# Patient Record
Sex: MALE | Race: WHITE | Employment: FULL TIME | URBAN - METROPOLITAN AREA
[De-identification: names, ages, dates, MRNs, and addresses within clinical notes are randomized per-mention and may not be internally consistent; named-entity substitution may affect disease eponyms.]

---

## 2017-10-30 ENCOUNTER — OFFICE VISIT (OUTPATIENT)
Dept: FAMILY MEDICINE | Facility: CLINIC | Age: 47
End: 2017-10-30
Payer: COMMERCIAL

## 2017-10-30 ENCOUNTER — RADIANT APPOINTMENT (OUTPATIENT)
Dept: GENERAL RADIOLOGY | Facility: CLINIC | Age: 47
End: 2017-10-30
Attending: PHYSICIAN ASSISTANT
Payer: COMMERCIAL

## 2017-10-30 VITALS
BODY MASS INDEX: 38.35 KG/M2 | DIASTOLIC BLOOD PRESSURE: 100 MMHG | HEART RATE: 101 BPM | OXYGEN SATURATION: 98 % | WEIGHT: 283.13 LBS | HEIGHT: 72 IN | TEMPERATURE: 97.7 F | SYSTOLIC BLOOD PRESSURE: 160 MMHG

## 2017-10-30 DIAGNOSIS — M79.672 PAIN OF LEFT HEEL: ICD-10-CM

## 2017-10-30 DIAGNOSIS — R73.01 ELEVATED FASTING BLOOD SUGAR: ICD-10-CM

## 2017-10-30 DIAGNOSIS — I10 HYPERTENSION GOAL BP (BLOOD PRESSURE) < 140/90: ICD-10-CM

## 2017-10-30 DIAGNOSIS — M72.2 PLANTAR FASCIITIS: Primary | ICD-10-CM
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
## 2017-10-30 DIAGNOSIS — E78.5 HYPERLIPIDEMIA LDL GOAL <160: ICD-10-CM

## 2017-10-30 DIAGNOSIS — M79.672 LEFT FOOT PAIN: ICD-10-CM

## 2017-10-30 DIAGNOSIS — E66.01 SEVERE OBESITY (BMI 35.0-39.9) WITH COMORBIDITY (H): ICD-10-CM

## 2017-10-30 LAB — HBA1C MFR BLD: 6.1 % (ref 4.3–6)

## 2017-10-30 PROCEDURE — 73630 X-RAY EXAM OF FOOT: CPT | Mod: LT

## 2017-10-30 PROCEDURE — 80061 LIPID PANEL: CPT | Performed by: PHYSICIAN ASSISTANT

## 2017-10-30 PROCEDURE — 83036 HEMOGLOBIN GLYCOSYLATED A1C: CPT | Performed by: PHYSICIAN ASSISTANT

## 2017-10-30 PROCEDURE — 82043 UR ALBUMIN QUANTITATIVE: CPT | Performed by: PHYSICIAN ASSISTANT

## 2017-10-30 PROCEDURE — 80053 COMPREHEN METABOLIC PANEL: CPT | Performed by: PHYSICIAN ASSISTANT

## 2017-10-30 PROCEDURE — 99214 OFFICE O/P EST MOD 30 MIN: CPT | Performed by: PHYSICIAN ASSISTANT

## 2017-10-30 PROCEDURE — 36415 COLL VENOUS BLD VENIPUNCTURE: CPT | Performed by: PHYSICIAN ASSISTANT

## 2017-10-30 RX ORDER — SIMVASTATIN 40 MG
40 TABLET ORAL AT BEDTIME
Qty: 90 TABLET | Refills: 0 | Status: SHIPPED | OUTPATIENT
Start: 2017-10-30 | End: 2018-10-16

## 2017-10-30 RX ORDER — LISINOPRIL 40 MG/1
40 TABLET ORAL DAILY
Qty: 90 TABLET | Refills: 0 | Status: SHIPPED | OUTPATIENT
Start: 2017-10-30 | End: 2018-06-20

## 2017-10-30 RX ORDER — IBUPROFEN 200 MG
800 TABLET ORAL
COMMUNITY
Start: 2017-10-30 | End: 2018-10-16

## 2017-10-30 RX ORDER — CHLORTHALIDONE 25 MG/1
25 TABLET ORAL DAILY
Qty: 90 TABLET | Refills: 0 | Status: SHIPPED | OUTPATIENT
Start: 2017-10-30 | End: 2018-06-20

## 2017-10-30 NOTE — NURSING NOTE
Chief Complaint   Patient presents with     Musculoskeletal Problem     left heel pain ~a few weeks. fell on Left foot Tuesday       Initial BP (!) 160/100  Pulse 101  Temp 97.7  F (36.5  C) (Tympanic)  Ht 6' (1.829 m)  Wt 283 lb 2 oz (128.4 kg)  SpO2 98%  BMI 38.4 kg/m2 Estimated body mass index is 38.4 kg/(m^2) as calculated from the following:    Height as of this encounter: 6' (1.829 m).    Weight as of this encounter: 283 lb 2 oz (128.4 kg).  Medication Reconciliation: complete   Gayla Mcduffie, CMA

## 2017-10-30 NOTE — PROGRESS NOTES
SUBJECTIVE:   Thomas Pierce is a 47 year old male who presents to clinic today for the following health issues:    Left heel pain:  Patient states he has had left heel pain for a few weeks. He states he will notice this pain while driving or when starting to walk, but the pain improves with walking. He reports no history of heel pain and hasn't noticed any blisters or changes in his skin.     Fall on left foot:  Patient states last Tuesday night he missed a step while getting out of his truck and fell, landing on his left foot. He states there was little swelling of his foot x 1-2 days. He describes the pain being located on the lateral aspect of his foot, beneath his ankle. He did not use any ice in treatment of this, and took aleve x 1 time in treatment of his pain. He states the aleve maybe helped a little. Patient reports that at rest he doesn't have any foot pain (other than heel pain), but that he can feel more pain with walking. He does state the pain is much improved from last week, but that he is not fully bearing weight on it yet secondary to discomfort.     Hypertension/Hyperlipidemia  Patient reports that he was previously followed by Dr. Leal (last OV at [facility] 7/22/16) but is unable to see her any longer due to her transfer to [facility].  He reports that he is taking his lisinopril (however per [facility] he last received a 6 month supply on 7/22/16) daily including this morning.  Historically he required 2 medications, including chlorthalidone.  He was able to stop the chlorthalidone once losing weight, unfortunately, he has put this weight back on.   He also reports still taking simvastatin 40 mg daily.    Elevated blood sugars  He has a history of elevated blood sugars and was on metformin 500 mg XR BID in 2014.  He reports being off this medication with his previous weight loss.  He has not had any recent labs checked and does not check blood sugars at home.    Wt  Readings from Last 5 Encounters:   10/30/17 283 lb 2 oz (128.4 kg)   04/27/15 208 lb (94.3 kg)   12/29/14 211 lb (95.7 kg)   06/24/14 262 lb 4.8 oz (119 kg)   05/30/14 275 lb (124.7 kg)       Problem list and histories reviewed & adjusted, as indicated.  Additional history: as documented    Patient Active Problem List   Diagnosis     Hypertension goal BP (blood pressure) < 140/90     Hyperlipidemia LDL goal <160     Elevated LFTs     Severe obesity (BMI 35.0-39.9) with comorbidity (H)     Elevated fasting blood sugar     Past Surgical History:   Procedure Laterality Date     MANDIBLE SURGERY         Social History   Substance Use Topics     Smoking status: Never Smoker     Smokeless tobacco: Never Used     Alcohol use Yes      Comment: occasionally      Family History   Problem Relation Age of Onset     DIABETES Mother      Type II     Cardiovascular Maternal Grandmother      PAD         Current Outpatient Prescriptions   Medication Sig Dispense Refill     ibuprofen (ADVIL/MOTRIN) 200 MG tablet Take 4 tablets (800 mg) by mouth 3 times daily (with meals) For at least 5 days       chlorthalidone (HYGROTON) 25 MG tablet Take 1 tablet (25 mg) by mouth daily 90 tablet 0     lisinopril (PRINIVIL/ZESTRIL) 40 MG tablet Take 1 tablet (40 mg) by mouth daily 90 tablet 0     simvastatin (ZOCOR) 40 MG tablet Take 1 tablet (40 mg) by mouth At Bedtime 90 tablet 0     aspirin 81 MG tablet Take 1 tablet by mouth daily.       No Known Allergies      Reviewed and updated as needed this visit by clinical staff  Tobacco  Allergies  Meds  Problems  Med Hx  Surg Hx  Fam Hx  Soc Hx        Reviewed and updated as needed this visit by Provider  Tobacco  Allergies  Meds  Problems  Med Hx  Surg Hx  Fam Hx  Soc Hx          ROS:  Constitutional, HEENT, cardiovascular, pulmonary, GI, , musculoskeletal, neuro, skin, endocrine and psych systems are negative, except as otherwise noted.      OBJECTIVE:   BP (!) 160/100  Pulse 101   Temp 97.7  F (36.5  C) (Tympanic)  Ht 6' (1.829 m)  Wt 283 lb 2 oz (128.4 kg)  SpO2 98%  BMI 38.4 kg/m2  Body mass index is 38.4 kg/(m^2).  GENERAL: healthy, alert and no distress  RESP: lungs clear to auscultation - no rales, rhonchi or wheezes  CV: regular rate and rhythm, normal S1 S2, no S3 or S4, no murmur, click or rub, no peripheral edema and peripheral pulses strong  MS: no gross musculoskeletal defects noted, no edema; no tenderness to palpation over left lateral malleolus; no pain with dorsiflexion, planterlexion, inversion or eversion of left ankle; no pain with valgus or varus maneuver; tenderness to palpation over insertion of plantar fascia; signifcant dry skin noted throughout plantar aspect of left foot  PSYCH: mentation appears normal, affect normal/bright    Diagnostic Test Results:  No results found for this or any previous visit (from the past 24 hour(s)).  Recent Results (from the past 744 hour(s))   XR Foot Left G/E 3 Views    Narrative    XR FOOT LT G/E 3 VW  10/30/2017 10:10 AM    HISTORY:  Pain    COMPARISON:  None.      Impression    IMPRESSION:  Negative.     LAZARA SALOMON MD       ASSESSMENT/PLAN:   Thomas was seen today for musculoskeletal problem.    Diagnoses and all orders for this visit:    Plantar fasciitis; Pain of left heel  Given patient's description of pain improved with walking and worsened with rest, patient diagnosed with plantar fascitis. Patient advised to take ibuprofen 800 mg TID to help with inflammation. Patient also given handout with foot exercises to help improve his plantar fascitis symptoms. Patient also had significant dryness of his heel and it was recommended he begin using Aquaphor ointment every night followed by placement of socks to aid in this and to prevent infection with cracking of heels.   -     ibuprofen (ADVIL/MOTRIN) 200 MG tablet; Take 4 tablets (800 mg) by mouth 3 times daily (with meals) For at least 5 days    Left foot pain; Fall (on)  (from) other stairs and steps, initial encounter  X-ray did not show any evidence of fracture at this time. Patient advised to ice foot as needed and use ibuprofen PRN to assist with decreasing the inflammation and pain. Patient to return if symptoms do not improve or worsen.  -     XR Foot Left G/E 3 Views; Future    Hypertension goal BP (blood pressure) < 140/90  Patient's blood pressure today was found to be elevated at 160/100. He states he continues to take lisinopril in management of this hypertension and that his blood pressure tends to wax and wane, but he hasn't been checking it very often as of late. In 2015 he was also treated with chlorthalidone 25 mg for his hypertension and it worked well. Per patient, he had been seeing Dr. Leal at [facility], however she changed clinics and his insurance isn't covered there so he does not currently have a PCP. Therefore, patient's lisinopril refilled and chlorthalidone added also added to his treatment regimen. Patient to return in next few weeks to have BP rechecked in pharmacy. Patient encouraged to also monitor his blood pressure while at home.  -     Comprehensive metabolic panel (BMP + Alb, Alk Phos, ALT, AST, Total. Bili, TP)  -     Albumin Random Urine Quantitative with Creat Ratio  -     chlorthalidone (HYGROTON) 25 MG tablet; Take 1 tablet (25 mg) by mouth daily  -     lisinopril (PRINIVIL/ZESTRIL) 40 MG tablet; Take 1 tablet (40 mg) by mouth daily    Hyperlipidemia LDL goal <160  Patient continues to take simvastatin (every morning). Since it appears he has not had a lipid panel since 2015 and is fasting today, this was reordered.  -     Comprehensive metabolic panel (BMP + Alb, Alk Phos, ALT, AST, Total. Bili, TP)  -     Lipid panel reflex to direct LDL Fasting  -     simvastatin (ZOCOR) 40 MG tablet; Take 1 tablet (40 mg) by mouth At Bedtime    Elevated fasting blood sugar  Patient has a PMH significant for diabetes. His last Hgb A1c was 5.5 on  7/22/16. He had been taking metformin prior to this, but states he was able to discontinue this after losing a considerable amount of weight (~70 lbs) and at that point was no longer considered to be diabetic. He has since gained that weight back and thus a repeat Hgb A1c was ordered at this time to ensure he is not diabetic.  -     Hemoglobin A1c    Jasmyn Giles PA-C  [facility] [place]

## 2017-10-30 NOTE — PATIENT INSTRUCTIONS
Aquaphor ointment                Plantar Fasciitis   What is plantar fasciitis?   Plantar fasciitis is a painful inflammation of the bottom of the foot between the ball of the foot and the heel.   How does it occur?   There are several possible causes of plantar fasciitis, including:     wearing high heels     gaining weight     increased walking, standing, or stair-climbing   If you wear high-heeled shoes, including western-style boots, for long periods of time, the tough, tendonlike tissue of the bottom of your foot can become shorter. This layer of tissue is called fascia. Pain occurs when you stretch fascia that has shortened. This painful stretching might happen, for example, when you walk barefoot after getting out of bed in the morning.   If you gain weight, you might be more likely to have plantar fasciitis, especially if you walk a lot or  shoes with poor heel cushioning. Normally there is a pad of fatty tissue under your heel bone. Weight gain might break down this fat pad and cause heel pain.   Runners may get plantar fasciitis when they change their workout and increase their mileage or frequency of workouts. It can also occur with a change in exercise surface or terrain, or if your shoes are worn out and don't provide enough cushion for your heels.   If the arches of your foot are abnormally high or low, you are more likely to develop plantar fasciitis than if your arches are normal.   What are the symptoms?   The main symptom of plantar fasciitis is heel pain when you walk. You may also feel pain when you stand and possibly even when you are resting. This pain typically occurs first thing in the morning after you get out of bed, when your foot is placed flat on the floor. The pain occurs because you are stretching the plantar fascia. The pain usually lessens with more walking, but you may have it again after periods of rest.   You may feel no pain when you are sleeping because the position of  your feet during rest allows the fascia to shorten and relax.   How is it diagnosed?   Your healthcare provider will ask about your symptoms. He or she will ask if the bottom of your heel is tender and if you have pain when you stretch the bottom of your foot. An X-ray of your heel may be done.   How is it treated?     Give your painful heel lots of rest. You may need to stay completely off your foot for several days when the pain is severe.     Your healthcare provider may recommend or prescribe anti-inflammatory medicines, such as aspirin or ibuprofen. These drugs decrease pain and inflammation. Adults aged 65 years and older should not take non-steroidal anti-inflammatory medicine for more than 7 days without their healthcare provider's approval. Resting your heel on an ice pack for a few minutes several times a day can also help.     Try to cushion your foot. You can do this by wearing athletic shoes, even at work, for awhile. Heel cushions can also be used. The cushions should be worn in both shoes. They are most helpful if you are overweight or an older adult.     Your provider may recommend special arch supports or inserts for your shoes called orthotics, either custom-made or off the shelf. These supports can be particularly helpful if you have flat feet or high arches.     Your provider may recommend an injection of a cortisone-like medicine.     Lose weight if needed.     A night splint may be recommended. This will keep the plantar fascia stretched while you are sleeping.     Physical therapy for additional treatments may be recommended     Surgery is rarely needed.   How long will the effects last?   You may find that the pain is sometimes worse and sometimes better over time. If you get treatment soon after you notice the pain, the symptoms should stop after several weeks. If, however, you have had plantar fasciitis for a long time, it may take many weeks to months for the pain to go away.   When can I  return to my normal activities?   Everyone recovers from an injury at a different rate. Return to your activities will be determined by how soon your foot recovers, not by how many days or weeks it has been since your injury has occurred. In general, the longer you have symptoms before you start treatment, the longer it will take to get better. The goal of rehabilitation is to return you to your normal activities as soon as is safely possible. If you return too soon you may worsen your injury.   You may safely return to your activities when, starting from the top of the list and progressing to the end, each of the following is true:     You have full range of motion in the injured foot compared with the uninjured foot.     You have full strength of the injured foot compared with the uninjured foot.     You can walk straight ahead without significant pain or limping.   How can I prevent plantar fasciitis?   The best way to prevent plantar fasciitis is to wear shoes that are well made and fit your feet. This is especially important when you exercise or walk a lot or stand for a long time on hard surfaces. Get new athletic shoes before your old shoes stop supporting and cushioning your feet.   You should also:     Avoid repeated jarring to the heel.     Keep a healthy weight.     Do your leg and foot stretching exercises regularly.   Developed by [employer]   Published by [employer]   Last modified: 2008-08-11   Last reviewed: 2008-07-07   This content is reviewed periodically and is subject to change as new health information becomes available. The information is intended to inform and educate and is not a replacement for medical evaluation, advice, diagnosis or treatment by a healthcare professional.   Sports Medicine Advisor 2009.1 Index  Sports Medicine Advisor 2009.1 Credits     2009 [employer] and/or its affiliates. All Rights Reserved.               Plantar Fasciitis Rehabilitation Exercises   You may begin  exercising the muscles of your foot right away by gently stretching them as follows:     Prone hip extension: Lie on your stomach with your legs straight out behind you. Tighten the buttocks and thigh muscles of your injured leg and lift it off the floor about 8 inches. Keep your knee straight. Hold for 5 seconds. Then lower your leg and relax. Do 3 sets of 10.     Towel stretch: Sit on a hard surface with one leg stretched out in front of you. Loop a towel around your toes and the ball of your foot and pull the towel toward your body keeping your knee straight. Hold this position for 15 to 30 seconds then relax. Repeat 3 times.   When the towel stretch becomes too easy, you may begin doing the standing calf stretch.     Standing calf stretch: Facing a wall, put your hands against the wall at about eye level. Keep one leg back with the heel on the floor, and the other leg forward. Turn your back foot slightly inward (as if you were pigeon-toed) as you slowly lean into the wall until you feel a stretch in the back of your calf. Hold for 15 to 30 seconds. Repeat 3 times and then switch the position of your legs and repeat the exercise 3 times. Do this exercise several times each day.     Sitting plantar fascia stretch: Sit in a chair and cross one foot over your other knee. Grab the base of your toes and pull them back toward your leg until you feel a comfortable stretch. Hold 15 seconds and repeat 3 times.   When you can stand comfortably on your injured foot, you can begin standing to stretch the bottom of your foot using the plantar fascia stretch.     Achilles stretch: Stand with the ball of one foot on a stair. Reach for the bottom step with your heel until you feel a stretch in the arch of your foot. Hold this position for 15 to 30 seconds and then relax. Repeat 3 times.   After you have stretched the bottom muscles of your foot, you can begin strengthening the top muscles of your foot.     Frozen can roll: Roll  your bare injured foot back and forth from your heel to your mid-arch over a frozen juice can. Repeat for 3 to 5 minutes. This exercise is particularly helpful if done first thing in the morning.     Towel pickup: With your heel on the ground,  a towel with your toes. Release. Repeat 10 to 20 times. When this gets easy, add more resistance by placing a book or small weight on the towel.     Balance and reach exercises   Stand upright next to a chair with your injured leg farthest from the chair. This will provide you with support if you need it. Stand just on the foot of your injured leg. Try to raise the arch of this foot while keeping your toes on the floor.   A. Keep your foot in this position and reach forward in front of you with the hand farthest away from the chair, allowing your knee to bend. Repeat this 10 times while maintaining the arch height. This exercise can be made more difficult by reaching farther in front of you. Do 2 sets.   B.  the same position as above. While maintaining your arch height, reach the hand farthest away from the chair across your body toward the chair. The farther you reach, the more challenging the exercise. Do 2 sets of 10.     Heel raise: Balance yourself while standing behind a chair or counter. Using the chair to help you, raise your body up onto your toes and hold for 5 seconds. Then slowly lower yourself down without holding onto the chair. Hold onto the chair or counter if you need to. When this exercise becomes less painful, try lowering on one leg only. Repeat 10 times. Do 3 sets of 10.     Side-lying leg lift: Lying on your uninjured side, tighten the front thigh muscles on your top leg and lift that leg 8 to 10 inches away from the other leg. Keep the leg straight and lower slowly. Do 3 sets of 10.   Written by Annetta Nascimento, MS, PT, and Rebecca Prince PT, [facility], OCS, for [employer].   Published by [employer].   Last modified: 2009-02-09   Last  reviewed: 2008-07-07   This content is reviewed periodically and is subject to change as new health information becomes available. The information is intended to inform and educate and is not a replacement for medical evaluation, advice, diagnosis or treatment by a healthcare professional.   Sports Medicine Advisor 2009.1 Index

## 2017-10-31 LAB
ALBUMIN SERPL-MCNC: 4.3 G/DL (ref 3.4–5)
ALP SERPL-CCNC: 71 U/L (ref 40–150)
ALT SERPL W P-5'-P-CCNC: 83 U/L (ref 0–70)
ANION GAP SERPL CALCULATED.3IONS-SCNC: 11 MMOL/L (ref 3–14)
AST SERPL W P-5'-P-CCNC: 37 U/L (ref 0–45)
BILIRUB SERPL-MCNC: 0.4 MG/DL (ref 0.2–1.3)
BUN SERPL-MCNC: 16 MG/DL (ref 7–30)
CALCIUM SERPL-MCNC: 9.3 MG/DL (ref 8.5–10.1)
CHLORIDE SERPL-SCNC: 107 MMOL/L (ref 94–109)
CHOLEST SERPL-MCNC: 273 MG/DL
CO2 SERPL-SCNC: 24 MMOL/L (ref 20–32)
CREAT SERPL-MCNC: 0.8 MG/DL (ref 0.66–1.25)
GFR SERPL CREATININE-BSD FRML MDRD: >90 ML/MIN/1.7M2
GLUCOSE SERPL-MCNC: 111 MG/DL (ref 70–99)
HDLC SERPL-MCNC: 53 MG/DL
LDLC SERPL CALC-MCNC: 184 MG/DL
NONHDLC SERPL-MCNC: 220 MG/DL
POTASSIUM SERPL-SCNC: 4 MMOL/L (ref 3.4–5.3)
PROT SERPL-MCNC: 7.8 G/DL (ref 6.8–8.8)
SODIUM SERPL-SCNC: 142 MMOL/L (ref 133–144)
TRIGL SERPL-MCNC: 181 MG/DL

## 2017-11-01 LAB
CREAT UR-MCNC: 118 MG/DL
MICROALBUMIN UR-MCNC: 13 MG/L
MICROALBUMIN/CREAT UR: 11.36 MG/G CR (ref 0–17)

## 2017-11-03 NOTE — PROGRESS NOTES
Thomas  I have reviewed your recent labs. Here are the results:    -Liver and gallbladder tests (ALT,AST, Alk phos,bilirubin) are modestly elevated. ADVISE: Avoiding alcohol and tylenol products and rechecking this in 2 months.  -Kidney function (GFR) is normal.  -Sodium is normal.  -Potassium is normal.  -Glucose is slight elevated and may be sign of early diabetes (prediabetes). ADVISE:: low carbohydrate diet, exercise, try to lose weight (if necessary) and recheck glucose in 2 months. (GLU,A1C, DX: prediabetes)  -LDL(bad) cholesterol level is elevated and your triglycerides are elevated which can increase your heart disease risk.  A diet high in fat and simple carbohydrates, genetics and being overweight can contribute to this. ADVISE: Please ensure you are taking your cholesterol medication nightly, adding [id number] mg of fish oil to your diet daily and working on weight loss and diet changes (the mediterranean diet is a great start).  Rechecking in 2 months.  -A1C test (average blood sugar the last 2-3 months) is consistent with return of your prediabetes.  Please work on weight loss, diet and exercise changes to improve this and avoid converting to diabetes.  Recheck in 2 months.  -Microalbumin (urine protein) test is normal.  ADVISE: recheck annually     If you have any questions please do not hesitate to contact our office via phone ([phone]) or MyChart.    Jasmyn Giles, MS, PA-C  [facility]

## 2018-01-16 ENCOUNTER — ALLIED HEALTH/NURSE VISIT (OUTPATIENT)
Dept: FAMILY MEDICINE | Facility: CLINIC | Age: 48
End: 2018-01-16
Payer: COMMERCIAL

## 2018-01-16 VITALS — DIASTOLIC BLOOD PRESSURE: 86 MMHG | SYSTOLIC BLOOD PRESSURE: 130 MMHG

## 2018-01-16 DIAGNOSIS — Z01.30 BP CHECK: Primary | ICD-10-CM

## 2018-01-16 PROCEDURE — 99207 ZZC NO CHARGE NURSE ONLY: CPT | Performed by: FAMILY MEDICINE

## 2018-01-16 NOTE — MR AVS SNAPSHOT
After Visit Summary   1/16/2018    Thomas Pierce    MRN: [id number]           Patient Information     Date Of Birth          1970        Visit Information        Provider Department      1/16/2018 9:29 AM Lissette Leal, DO [facility]        Today's Diagnoses     BP check    -  1       Follow-ups after your visit        Who to contact     If you have questions or need follow up information about today's clinic visit or your schedule please contact [facility] directly at [phone].  Normal or non-critical lab and imaging results will be communicated to you by [employer], letter or phone within 4 business days after the clinic has received the results. If you do not hear from us within 7 days, please contact the clinic through [employer] or phone. If you have a critical or abnormal lab result, we will notify you by phone as soon as possible.  Submit refill requests through [employer] or call your pharmacy and they will forward the refill request to us. Please allow 3 business days for your refill to be completed.          Additional Information About Your Visit        MyChart Information     [employer] gives you secure access to your electronic health record. If you see a primary care provider, you can also send messages to your care team and make appointments. If you have questions, please call your primary care clinic.  If you do not have a primary care provider, please call [phone] and they will assist you.        Care EveryWhere ID     This is your Care EveryWhere ID. This could be used by other organizations to access your [place] medical records  [id number]         Blood Pressure from Last 3 Encounters:   01/16/18 130/86   10/30/17 (!) 160/100   04/27/15 110/72    Weight from Last 3 Encounters:   10/30/17 283 lb 2 oz (128.4 kg)   04/27/15 208 lb (94.3 kg)   12/29/14 211 lb (95.7 kg)              Today, you had the following     No orders found  for display       Primary Care Provider Office Phone # Fax #    Lissette Leal [phone]       [facility] [place] [address] [facility] [address]        Equal Access to Services     SAPNA MCKEON : Hadii jackie ku hadantonioo Soomaali, waaxda luqadaha, qaybta kaalmada adeegyada, waxay idiin hayjluisn adekailey winston laalyson bates. So [facility] [phone].    ATENCIÓN: Si habla español, tiene a tripathi disposición servicios gratuitos de asistencia lingüística. [facility] [phone].    We comply with applicable federal civil rights laws and [place] laws. We do not discriminate on the basis of race, color, national origin, age, disability, sex, sexual orientation, or gender identity.            Thank you!     Thank you for choosing [facility]  for your care. Our goal is always to provide you with excellent care. Hearing back from our patients is one way we can continue to improve our services. Please take a few minutes to complete the written survey that you may receive in the mail after your visit with us. Thank you!             Your Updated Medication List - Protect others around you: Learn how to safely use, store and throw away your medicines at www.disposemymeds.org.          This list is accurate as of: 1/16/18  9:31 AM.  Always use your most recent med list.                   Brand Name Dispense Instructions for use Diagnosis    aspirin 81 MG tablet      Take 1 tablet by mouth daily.        chlorthalidone 25 MG tablet    HYGROTON    90 tablet    Take 1 tablet (25 mg) by mouth daily    Hypertension goal BP (blood pressure) < 140/90       ibuprofen 200 MG tablet    ADVIL/MOTRIN     Take 4 tablets (800 mg) by mouth 3 times daily (with meals) For at least 5 days    Plantar fasciitis       lisinopril 40 MG tablet    PRINIVIL/ZESTRIL    90 tablet    Take 1 tablet (40 mg) by mouth daily    Hypertension goal BP (blood pressure) < 140/90       simvastatin 40 MG tablet    ZOCOR     90 tablet    Take 1 tablet (40 mg) by mouth At Bedtime    Hyperlipidemia LDL goal <160

## 2018-01-16 NOTE — PROGRESS NOTES
Thomas Pierce is enrolled/participating in the retail pharmacy Blood Pressure Goals Achievement Program (BPGAP).  Thomas Pierce was evaluated at [facility] on January 16, 2018 at which time his blood pressure was:    BP Readings from Last 3 Encounters:   01/16/18 130/86   10/30/17 (!) 160/100   04/27/15 110/72     Reviewed lifestyle modifications for blood pressure control and reduction: including making healthy food choices, managing weight, getting regular exercise, smoking cessation, reducing alcohol consumption, monitoring blood pressure regularly.     Thomas Pierce is not experiencing symptoms.    Follow-Up: BP is at goal of < 140/90mmHg (patient 18+ years of age with or without diabetes).  Recommended follow-up at pharmacy in 6 months.         Completed by: Smita Fang [facility]   [phone]

## 2018-03-21 ENCOUNTER — ALLIED HEALTH/NURSE VISIT (OUTPATIENT)
Dept: FAMILY MEDICINE | Facility: CLINIC | Age: 48
End: 2018-03-21
Payer: COMMERCIAL

## 2018-03-21 VITALS — DIASTOLIC BLOOD PRESSURE: 78 MMHG | SYSTOLIC BLOOD PRESSURE: 124 MMHG

## 2018-03-21 DIAGNOSIS — I10 HYPERTENSION GOAL BP (BLOOD PRESSURE) < 140/90: Primary | ICD-10-CM

## 2018-03-21 PROCEDURE — 99207 ZZC NO CHARGE NURSE ONLY: CPT | Performed by: FAMILY MEDICINE

## 2018-03-21 NOTE — PROGRESS NOTES
Thomas Pierce is enrolled/participating in the retail pharmacy Blood Pressure Goals Achievement Program (BPGAP).  Thomas Pierce was evaluated at [facility] on March 21, 2018 at which time his blood pressure was:    BP Readings from Last 3 Encounters:   03/21/18 124/78   01/16/18 130/86   10/30/17 (!) 160/100     Reviewed lifestyle modifications for blood pressure control and reduction: including making healthy food choices, managing weight, getting regular exercise, smoking cessation, reducing alcohol consumption, monitoring blood pressure regularly.     Thomas Pierce is not experiencing symptoms.    Follow-Up: BP is at goal of < 140/90mmHg (patient 18+ years of age with or without diabetes).  Recommended follow-up at pharmacy in 6 months.     Recommendation to Provider:      Thomas Pierce was evaluated for enrollment into the PGEN study today.    Patient eligible for enrollment:  No  Patient interested in enrollment:  No    Completed by:   Luca Colbert RPh  [facility]  [phone]

## 2018-03-21 NOTE — MR AVS SNAPSHOT
After Visit Summary   3/21/2018    Thomas Pierce    MRN: [id number]           Patient Information     Date Of Birth          1970        Visit Information        Provider Department      3/21/2018 12:29 PM Lissette Leal, DO [facility]        Today's Diagnoses     Hypertension goal BP (blood pressure) < 140/90    -  1       Follow-ups after your visit        Who to contact     If you have questions or need follow up information about today's clinic visit or your schedule please contact [facility] directly at [phone].  Normal or non-critical lab and imaging results will be communicated to you by [employer]art, letter or phone within 4 business days after the clinic has received the results. If you do not hear from us within 7 days, please contact the clinic through RV [employer]t or phone. If you have a critical or abnormal lab result, we will notify you by phone as soon as possible.  Submit refill requests through [email or web address] or call your pharmacy and they will forward the refill request to us. Please allow 3 business days for your refill to be completed.          Additional Information About Your Visit        MyChart Information     [email or web address] gives you secure access to your electronic health record. If you see a primary care provider, you can also send messages to your care team and make appointments. If you have questions, please call your primary care clinic.  If you do not have a primary care provider, please call [phone] and they will assist you.        Care EveryWhere ID     This is your Care EveryWhere ID. This could be used by other organizations to access your [place] medical records  [id number]         Blood Pressure from Last 3 Encounters:   03/21/18 124/78   01/16/18 130/86   10/30/17 (!) 160/100    Weight from Last 3 Encounters:   10/30/17 283 lb 2 oz (128.4 kg)   04/27/15 208 lb (94.3 kg)   12/29/14 211 lb (95.7 kg)              Today, you  had the following     No orders found for display       Primary Care Provider Office Phone # Fax #    Lissette Leal [phone]       [facility] [place] [address] [facility] [address]        Equal Access to Services     SOFYALETHA LINDA : Hadii aad ku hadasho Soomaali, waaxda luqadaha, qaybta kaalmada adeegyada, waxay idiin hayaan adeeg khjoesh laalyson . So [facility] [phone].    ATENCIÓN: Si habla español, tiene a tripathi disposición servicios gratuitos de asistencia lingüística. Llame al [phone].    We comply with applicable federal civil rights laws and [place] laws. We do not discriminate on the basis of race, color, national origin, age, disability, sex, sexual orientation, or gender identity.            Thank you!     Thank you for choosing [facility]  for your care. Our goal is always to provide you with excellent care. Hearing back from our patients is one way we can continue to improve our services. Please take a few minutes to complete the written survey that you may receive in the mail after your visit with us. Thank you!             Your Updated Medication List - Protect others around you: Learn how to safely use, store and throw away your medicines at www.disposemymeds.org.          This list is accurate as of 3/21/18 12:35 PM.  Always use your most recent med list.                   Brand Name Dispense Instructions for use Diagnosis    aspirin 81 MG tablet      Take 1 tablet by mouth daily.        chlorthalidone 25 MG tablet    HYGROTON    90 tablet    Take 1 tablet (25 mg) by mouth daily    Hypertension goal BP (blood pressure) < 140/90       ibuprofen 200 MG tablet    ADVIL/MOTRIN     Take 4 tablets (800 mg) by mouth 3 times daily (with meals) For at least 5 days    Plantar fasciitis       lisinopril 40 MG tablet    PRINIVIL/ZESTRIL    90 tablet    Take 1 tablet (40 mg) by mouth daily    Hypertension goal BP (blood pressure) < 140/90        simvastatin 40 MG tablet    ZOCOR    90 tablet    Take 1 tablet (40 mg) by mouth At Bedtime    Hyperlipidemia LDL goal <160

## 2018-06-18 DIAGNOSIS — I10 HYPERTENSION GOAL BP (BLOOD PRESSURE) < 140/90: ICD-10-CM

## 2018-06-19 NOTE — TELEPHONE ENCOUNTER
"Requested Prescriptions   Pending Prescriptions Disp Refills     chlorthalidone (HYGROTON) 25 MG tablet [Pharmacy Med Name: CHLORTHALIDONE 25MG TABLETS]  Last Written Prescription Date:  10/30/17  Last Fill Quantity: 90,  # refills: 0   Last office visit: 10/30/2017 with prescribing provider:  Chan Palencia Office Visit:     90 tablet 0     Sig: TAKE 1 TABLET(25 MG) BY MOUTH DAILY    Diuretics (Including Combos) Protocol Passed    6/18/2018  8:33 PM       Passed - Blood pressure under 140/90 in past 12 months    BP Readings from Last 3 Encounters:   03/21/18 124/78   01/16/18 130/86   10/30/17 (!) 160/100                Passed - Recent (12 mo) or future (30 days) visit within the authorizing provider's specialty    Patient had office visit in the last 12 months or has a visit in the next 30 days with authorizing provider or within the authorizing provider's specialty.  See \"Patient Info\" tab in inbasket, or \"Choose Columns\" in Meds & Orders section of the refill encounter.           Passed - Patient is age 18 or older       Passed - Normal serum creatinine on file in past 12 months    Recent Labs   Lab Test  10/30/17   1055   CR  0.80             Passed - Normal serum potassium on file in past 12 months    Recent Labs   Lab Test  10/30/17   1055   POTASSIUM  4.0                   Passed - Normal serum sodium on file in past 12 months    Recent Labs   Lab Test  10/30/17   1055   NA  142              lisinopril (PRINIVIL/ZESTRIL) 40 MG tablet [Pharmacy Med Name: LISINOPRIL 40MG TABLETS]  Last Written Prescription Date:  10/30/17  Last Fill Quantity: 90,  # refills: 0   Last office visit: 10/30/2017 with prescribing provider:  Chan Palencia Office Visit:     90 tablet 0     Sig: TAKE 1 TABLET(40 MG) BY MOUTH DAILY    ACE Inhibitors (Including Combos) Protocol Passed    6/18/2018  8:33 PM       Passed - Blood pressure under 140/90 in past 12 months    BP Readings from Last 3 Encounters:   03/21/18 124/78   01/16/18 " "130/86   10/30/17 (!) 160/100                Passed - Recent (12 mo) or future (30 days) visit within the authorizing provider's specialty    Patient had office visit in the last 12 months or has a visit in the next 30 days with authorizing provider or within the authorizing provider's specialty.  See \"Patient Info\" tab in inbasket, or \"Choose Columns\" in Meds & Orders section of the refill encounter.           Passed - Patient is age 18 or older       Passed - Normal serum creatinine on file in past 12 months    Recent Labs   Lab Test  10/30/17   1055   CR  0.80            Passed - Normal serum potassium on file in past 12 months    Recent Labs   Lab Test  10/30/17   1055   POTASSIUM  4.0               "

## 2018-06-20 DIAGNOSIS — I10 HYPERTENSION GOAL BP (BLOOD PRESSURE) < 140/90: ICD-10-CM

## 2018-06-20 NOTE — TELEPHONE ENCOUNTER
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  She hasn't worked for [employer] since 2015.      Pt did see Jasmyn Giles PA-C 10/30/2017.   BP Readings from Last 3 Encounters:   03/21/18 124/78   01/16/18 130/86   10/30/17 (!) 160/100     If pt going to see us or [facility].?  If us , ok to do x 1 month only. Pt needs recheck office visit with Jasmyn Giles PA-C before more refills. Please inform pt and  Please assist pt in making appt to be seen.     If [facility] - ok for 1 month only, then f/u with them.

## 2018-06-20 NOTE — TELEPHONE ENCOUNTER
Break in medication-  Left message on answering machine for patient to call back.      [facility]  [phone]

## 2018-06-20 NOTE — TELEPHONE ENCOUNTER
Pt has been getting these refilled and has been taking them almost daily. Pt has about 10-15 of each tablet left.   Pt has received these from [facility] in [place].      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    Routing refill request to provider for review/approval because:  A break in medication  Chandrika Scott RN  [facility]

## 2018-06-21 NOTE — TELEPHONE ENCOUNTER
"Requested Prescriptions   Pending Prescriptions Disp Refills     lisinopril (PRINIVIL/ZESTRIL) 40 MG tablet [Pharmacy Med Name: LISINOPRIL 40MG TABLETS]  Last Written Prescription Date:  10/30/18  Last Fill Quantity: 90,  # refills: 3   Last office visit: 10/30/2017 with prescribing provider:  Chan Palencia Office Visit:     90 tablet 0     Sig: TAKE 1 TABLET(40 MG) BY MOUTH DAILY    ACE Inhibitors (Including Combos) Protocol Passed    6/20/2018  3:30 PM       Passed - Blood pressure under 140/90 in past 12 months    BP Readings from Last 3 Encounters:   03/21/18 124/78   01/16/18 130/86   10/30/17 (!) 160/100          Passed - Recent (12 mo) or future (30 days) visit within the authorizing provider's specialty    Patient had office visit in the last 12 months or has a visit in the next 30 days with authorizing provider or within the authorizing provider's specialty.  See \"Patient Info\" tab in inbasket, or \"Choose Columns\" in Meds & Orders section of the refill encounter.           Passed - Patient is age 18 or older       Passed - Normal serum creatinine on file in past 12 months    Recent Labs   Lab Test  10/30/17   1055   CR  0.80          Passed - Normal serum potassium on file in past 12 months    Recent Labs   Lab Test  10/30/17   1055   POTASSIUM  4.0           chlorthalidone (HYGROTON) 25 MG tablet [Pharmacy Med Name: CHLORTHALIDONE 25MG TABLETS]  Last Written Prescription Date:  10/30/18  Last Fill Quantity: 90,  # refills: 0   Last office visit: 10/30/2017 with prescribing provider:  Chan   Future Office Visit:     90 tablet 0     Sig: TAKE 1 TABLET(25 MG) BY MOUTH DAILY    Diuretics (Including Combos) Protocol Passed    6/20/2018  3:30 PM       Passed - Blood pressure under 140/90 in past 12 months    BP Readings from Last 3 Encounters:   03/21/18 124/78   01/16/18 130/86   10/30/17 (!) 160/100          Passed - Recent (12 mo) or future (30 days) visit within the authorizing provider's specialty    " "Patient had office visit in the last 12 months or has a visit in the next 30 days with authorizing provider or within the authorizing provider's specialty.  See \"Patient Info\" tab in inbasket, or \"Choose Columns\" in Meds & Orders section of the refill encounter.           Passed - Patient is age 18 or older       Passed - Normal serum creatinine on file in past 12 months    Recent Labs   Lab Test  10/30/17   1055   CR  0.80          Passed - Normal serum potassium on file in past 12 months    Recent Labs   Lab Test  10/30/17   1055   POTASSIUM  4.0           Passed - Normal serum sodium on file in past 12 months    Recent Labs   Lab Test  10/30/17   1055   NA  142             "

## 2018-06-22 RX ORDER — CHLORTHALIDONE 25 MG/1
TABLET ORAL
Qty: 90 TABLET | Refills: 0 | Status: SHIPPED | OUTPATIENT
Start: 2018-06-22 | End: 2018-10-16

## 2018-06-22 RX ORDER — LISINOPRIL 40 MG/1
TABLET ORAL
Qty: 90 TABLET | Refills: 0 | Status: SHIPPED | OUTPATIENT
Start: 2018-06-22 | End: 2018-10-16

## 2018-06-22 NOTE — TELEPHONE ENCOUNTER
Prescription approved per [facility] Refill Protocol.    Alva Olsen, BS, RN, N  [facility]) [phone]

## 2018-06-22 NOTE — TELEPHONE ENCOUNTER
Attempt #1  Called patient @ [phone] - Left a non-detailed message to call back and speak with any triage nurse.    Shavon Frias RN  [place] [facility]

## 2018-07-02 NOTE — TELEPHONE ENCOUNTER
Attempt #2  Called patient @ # below - Left a non-detailed message to call back and speak with any triage nurse.    Shavon Frias RN  [place] [facility]

## 2018-07-03 RX ORDER — CHLORTHALIDONE 25 MG/1
TABLET ORAL
Qty: 90 TABLET | Refills: 0 | OUTPATIENT
Start: 2018-07-03

## 2018-07-03 RX ORDER — LISINOPRIL 40 MG/1
TABLET ORAL
Qty: 90 TABLET | Refills: 0 | OUTPATIENT
Start: 2018-07-03

## 2018-10-16 ENCOUNTER — OFFICE VISIT (OUTPATIENT)
Dept: FAMILY MEDICINE | Facility: CLINIC | Age: 48
End: 2018-10-16
Payer: COMMERCIAL

## 2018-10-16 VITALS
TEMPERATURE: 98.3 F | HEIGHT: 72 IN | HEART RATE: 114 BPM | SYSTOLIC BLOOD PRESSURE: 122 MMHG | OXYGEN SATURATION: 97 % | BODY MASS INDEX: 37.38 KG/M2 | WEIGHT: 276 LBS | DIASTOLIC BLOOD PRESSURE: 68 MMHG

## 2018-10-16 DIAGNOSIS — Z12.5 SCREENING FOR PROSTATE CANCER: ICD-10-CM

## 2018-10-16 DIAGNOSIS — E11.9 TYPE 2 DIABETES MELLITUS WITHOUT COMPLICATION, WITHOUT LONG-TERM CURRENT USE OF INSULIN (H): ICD-10-CM

## 2018-10-16 DIAGNOSIS — E66.01 SEVERE OBESITY (BMI 35.0-39.9) WITH COMORBIDITY (H): ICD-10-CM

## 2018-10-16 DIAGNOSIS — Z12.11 SPECIAL SCREENING FOR MALIGNANT NEOPLASMS, COLON: ICD-10-CM

## 2018-10-16 DIAGNOSIS — Z23 NEED FOR PNEUMOCOCCAL VACCINE: ICD-10-CM

## 2018-10-16 DIAGNOSIS — R79.89 ELEVATED LFTS: ICD-10-CM

## 2018-10-16 DIAGNOSIS — Z00.01 ENCOUNTER FOR ROUTINE ADULT HEALTH EXAMINATION WITH ABNORMAL FINDINGS: Primary | ICD-10-CM

## 2018-10-16 DIAGNOSIS — Z23 NEED FOR PROPHYLACTIC VACCINATION AND INOCULATION AGAINST INFLUENZA: ICD-10-CM

## 2018-10-16 DIAGNOSIS — L98.9 SKIN LESION: ICD-10-CM

## 2018-10-16 DIAGNOSIS — E78.5 HYPERLIPIDEMIA LDL GOAL <160: ICD-10-CM

## 2018-10-16 DIAGNOSIS — I10 HYPERTENSION GOAL BP (BLOOD PRESSURE) < 140/90: ICD-10-CM

## 2018-10-16 PROBLEM — R73.01 ELEVATED FASTING BLOOD SUGAR: Status: RESOLVED | Noted: 2017-10-30 | Resolved: 2018-10-16

## 2018-10-16 LAB — HBA1C MFR BLD: 7 % (ref 0–5.6)

## 2018-10-16 PROCEDURE — 36415 COLL VENOUS BLD VENIPUNCTURE: CPT | Performed by: PHYSICIAN ASSISTANT

## 2018-10-16 PROCEDURE — 99213 OFFICE O/P EST LOW 20 MIN: CPT | Mod: 25 | Performed by: PHYSICIAN ASSISTANT

## 2018-10-16 PROCEDURE — 82043 UR ALBUMIN QUANTITATIVE: CPT | Performed by: PHYSICIAN ASSISTANT

## 2018-10-16 PROCEDURE — 84443 ASSAY THYROID STIM HORMONE: CPT | Performed by: PHYSICIAN ASSISTANT

## 2018-10-16 PROCEDURE — 90472 IMMUNIZATION ADMIN EACH ADD: CPT | Performed by: PHYSICIAN ASSISTANT

## 2018-10-16 PROCEDURE — 99396 PREV VISIT EST AGE 40-64: CPT | Mod: 25 | Performed by: PHYSICIAN ASSISTANT

## 2018-10-16 PROCEDURE — 90686 IIV4 VACC NO PRSV 0.5 ML IM: CPT | Performed by: PHYSICIAN ASSISTANT

## 2018-10-16 PROCEDURE — 80053 COMPREHEN METABOLIC PANEL: CPT | Performed by: PHYSICIAN ASSISTANT

## 2018-10-16 PROCEDURE — 83036 HEMOGLOBIN GLYCOSYLATED A1C: CPT | Performed by: PHYSICIAN ASSISTANT

## 2018-10-16 PROCEDURE — G0103 PSA SCREENING: HCPCS | Performed by: PHYSICIAN ASSISTANT

## 2018-10-16 PROCEDURE — 90471 IMMUNIZATION ADMIN: CPT | Performed by: PHYSICIAN ASSISTANT

## 2018-10-16 PROCEDURE — 80061 LIPID PANEL: CPT | Performed by: PHYSICIAN ASSISTANT

## 2018-10-16 PROCEDURE — 90732 PPSV23 VACC 2 YRS+ SUBQ/IM: CPT | Performed by: PHYSICIAN ASSISTANT

## 2018-10-16 PROCEDURE — 99207 C FOOT EXAM  NO CHARGE: CPT | Mod: 25 | Performed by: PHYSICIAN ASSISTANT

## 2018-10-16 RX ORDER — LISINOPRIL 40 MG/1
TABLET ORAL
Qty: 90 TABLET | Refills: 3 | Status: SHIPPED | OUTPATIENT
Start: 2018-10-16 | End: 2019-11-04

## 2018-10-16 RX ORDER — CHLORTHALIDONE 25 MG/1
TABLET ORAL
Qty: 90 TABLET | Refills: 3 | Status: SHIPPED | OUTPATIENT
Start: 2018-10-16 | End: 2019-11-04

## 2018-10-16 RX ORDER — SIMVASTATIN 40 MG
40 TABLET ORAL AT BEDTIME
Qty: 90 TABLET | Refills: 3 | Status: SHIPPED | OUTPATIENT
Start: 2018-10-16 | End: 2019-11-04

## 2018-10-16 NOTE — PROGRESS NOTES
SUBJECTIVE:   CC: Thomas Pierce is an 48 year old male who presents for preventative health visit.     Healthy Habits:    Do you get at least three servings of calcium containing foods daily (dairy, green leafy vegetables, etc.)? yes    Amount of exercise or daily activities, outside of work: 0 day(s) per week    Problems taking medications regularly No    Medication side effects: No    Have you had an eye exam in the past two years? Yes, about a year ago - encouraged to follow up annually for this.    Do you see a dentist twice per year? no    Do you have sleep apnea, excessive snoring or daytime drowsiness?no     1) Re-fill HTN and cholesterol meds.  On routine for the past 1 yr including chlorthalidone 25mg daily, lisinopril 40mg dialy and zocor 40mg nightly.     2) Reports hx of DM - a1c a few yrs just right at cut off of 6.5.   Was put on metformin for a period of time and lost a bunch of weight such that metformin was removed.   Admits from his job he has gained more weight again and things have regressed since initial diagnosis.  Overdue for labs and encouraged to have a1c done every 6 months for monitoring.    Sochx: works as a  so hard to schedule appt in advance. Reviewed Saturday clinic options for additional access if he needs as well.       Today's PHQ-2 Score:   PHQ-2 ( 1999 Pf[employer]) 10/16/2018 5/30/2014   Q1: Little interest or pleasure in doing things 0 0   Q2: Feeling down, depressed or hopeless 0 0   PHQ-2 Score 0 0       Abuse: Current or Past(Physical, Sexual or Emotional)- No  Do you feel safe in your environment - Yes    Social History   Substance Use Topics     Smoking status: Never Smoker     Smokeless tobacco: Never Used     Alcohol use Yes      Comment: occasionally       If you drink alcohol do you typically have >3 drinks per day or >7 drinks per week? No                      Last PSA: No results found for: PSA    Reviewed orders with patient. Reviewed health maintenance  and updated orders accordingly - Yes  BP Readings from Last 3 Encounters:   10/16/18 122/68   03/21/18 124/78   01/16/18 130/86    Wt Readings from Last 3 Encounters:   10/16/18 276 lb (125.2 kg)   10/30/17 283 lb 2 oz (128.4 kg)   04/27/15 208 lb (94.3 kg)                  Patient Active Problem List   Diagnosis     Hypertension goal BP (blood pressure) < 140/90     Hyperlipidemia LDL goal <160     Elevated LFTs     Severe obesity (BMI 35.0-39.9) with comorbidity (H)     Type 2 diabetes mellitus without complication, without long-term current use of insulin (H)     Past Surgical History:   Procedure Laterality Date     MANDIBLE SURGERY         Social History   Substance Use Topics     Smoking status: Never Smoker     Smokeless tobacco: Never Used     Alcohol use Yes      Comment: occasionally      Family History   Problem Relation Age of Onset     Diabetes Mother      Type II     Cardiovascular Maternal Grandmother      PAD     Colon Polyps Cousin      Ovarian Cancer Maternal Aunt      Colon Cancer No family hx of      Prostate Cancer No family hx of          Current Outpatient Prescriptions   Medication Sig Dispense Refill     aspirin 81 MG tablet Take 1 tablet by mouth daily.       chlorthalidone (HYGROTON) 25 MG tablet TAKE 1 TABLET(25 MG) BY MOUTH DAILY 90 tablet 3     lisinopril (PRINIVIL/ZESTRIL) 40 MG tablet TAKE 1 TABLET(40 MG) BY MOUTH DAILY 90 tablet 3     simvastatin (ZOCOR) 40 MG tablet Take 1 tablet (40 mg) by mouth At Bedtime 90 tablet 3     [DISCONTINUED] chlorthalidone (HYGROTON) 25 MG tablet TAKE 1 TABLET(25 MG) BY MOUTH DAILY 90 tablet 0     [DISCONTINUED] lisinopril (PRINIVIL/ZESTRIL) 40 MG tablet TAKE 1 TABLET(40 MG) BY MOUTH DAILY 90 tablet 0     [DISCONTINUED] simvastatin (ZOCOR) 40 MG tablet Take 1 tablet (40 mg) by mouth At Bedtime 90 tablet 0     No Known Allergies  Recent Labs   Lab Test  10/16/18   1142  10/30/17   1055  04/27/15   1053  12/29/14   0845  05/30/14   0820   A1C  7.0*  6.1*   5.8  5.5  6.5*   LDL   --   184*   --   91  151*   HDL   --   53   --   52  48   TRIG   --   181*   --   64  315*   ALT   --   83*  23   --   69   CR   --   0.80  0.82   --   0.94   GFRESTIMATED   --   >90  >90  Non African American GFR Calc     --   86   GFRESTBLACK   --   >90  >90  African American GFR Calc     --   >90   POTASSIUM   --   4.0  4.6   --   4.4   TSH   --    --    --   2.19   --         Reviewed and updated as needed this visit by clinical staff  Tobacco  Allergies  Meds  Med Hx  Surg Hx  Fam Hx  Soc Hx        Reviewed and updated as needed this visit by Provider  Tobacco  Allergies  Meds  Med Hx  Surg Hx  Fam Hx  Soc Hx           ROS:  CONSTITUTIONAL: NEGATIVE for fever, chills, change in weight  INTEGUMENTARY/SKIN: NEGATIVE for worrisome rashes, moles or lesions  EYES: NEGATIVE for vision changes or irritation  ENT: NEGATIVE for ear, mouth and throat problems  RESP: NEGATIVE for significant cough or SOB  CV: NEGATIVE for chest pain, palpitations or peripheral edema  GI: NEGATIVE for nausea, abdominal pain, heartburn, or change in bowel habits   male: negative for dysuria, hematuria, decreased urinary stream, erectile dysfunction, urethral discharge  MUSCULOSKELETAL: NEGATIVE for significant arthralgias or myalgia  NEURO: NEGATIVE for weakness, dizziness or paresthesias  PSYCHIATRIC: NEGATIVE for changes in mood or affect    OBJECTIVE:   /68 (BP Location: Right arm, Cuff Size: Adult Large)  Pulse 114  Temp 98.3  F (36.8  C) (Oral)  Ht 6' (1.829 m)  Wt 276 lb (125.2 kg)  SpO2 97%  BMI 37.43 kg/m2  EXAM:  GENERAL: healthy, alert and no distress  EYES: Eyes grossly normal to inspection, PERRL and conjunctivae and sclerae normal  HENT: ear canals and TM's normal, nose and mouth without ulcers or lesions  NECK: no adenopathy, no asymmetry, masses, or scars and thyroid normal to palpation  RESP: lungs clear to auscultation - no rales, rhonchi or wheezes  CV: regular rate and  "rhythm, normal S1 S2, no S3 or S4, no murmur, click or rub, no peripheral edema and peripheral pulses strong  ABDOMEN: soft, nontender, no hepatosplenomegaly, no masses and bowel sounds normal  MS: no gross musculoskeletal defects noted, no edema  SKIN: no suspicious lesions or rashes  NEURO: Normal strength and tone, mentation intact and speech normal  PSYCH: mentation appears normal, affect normal/bright    Diagnostic Test Results:  none     ASSESSMENT/PLAN:       ICD-10-CM    1. Encounter for routine adult health examination with abnormal findings Z00.01    2. Hypertension goal BP (blood pressure) < 140/90 I10 Comprehensive metabolic panel     chlorthalidone (HYGROTON) 25 MG tablet     lisinopril (PRINIVIL/ZESTRIL) 40 MG tablet   3. Hyperlipidemia LDL goal <160 E78.5 Lipid panel reflex to direct LDL Fasting     Comprehensive metabolic panel     simvastatin (ZOCOR) 40 MG tablet   4. Elevated LFTs R94.5    5. Severe obesity (BMI 35.0-39.9) with comorbidity (H) E66.01    6. Type 2 diabetes mellitus without complication, without long-term current use of insulin (H) E11.9 Hemoglobin A1c     Albumin Random Urine Quantitative with Creat Ratio     TSH with free T4 reflex     FOOT EXAM   7. Screening for prostate cancer Z12.5 PSA, screen   8. Special screening for malignant neoplasms, colon Z12.11 Fecal colorectal cancer screen (FIT)   9. Skin lesion - 5x5mm dark brown/black cental mid back. Has had for \"years.\" L98.9    10. Need for prophylactic vaccination and inoculation against influenza Z23 FLU VACCINE, SPLIT VIRUS, IM (QUADRIVALENT) [60616]- >3 YRS     Vaccine Administration, Initial [45289]   11. Need for pneumococcal vaccine Z23 Pneumococcal vaccine 23 valent PPSV23  (Pneumovax) [09991]   Updated all diabetes health maintenance items today and encouraged follow-up for annual eye exam.  Advised pt he needs to be seen every 6 months and to have a1c rechecked. He reported difficulty with scheduling appointments so " "reviewed additional options like Saturday clinic in [place].  Incidentally was found to have dark macule on back which pt reports has been present for \"years\" and doesn't believe it has changed. Due to coloration advised further eval with biopsy or referral to skin clinic. He will think about this and let me know if he changes his mind.    He also mentioned a possible pinched nerve in his neck that began 1 month ago - Given we spent additional time addressing his diabetes today I asked that he schedule a follow-up appointment for further evaluation of this.     COUNSELING:  Reviewed preventive health counseling, as reflected in patient instructions       Regular exercise       Healthy diet/nutrition       Immunizations    Vaccinated for: Influenza and Pneumococcal             HIV screeninx in teen years, 1x in adult years, and at intervals if high risk       Colon cancer screening       Prostate cancer screening    BP Readings from Last 1 Encounters:   10/16/18 122/68     Estimated body mass index is 37.43 kg/(m^2) as calculated from the following:    Height as of this encounter: 6' (1.829 m).    Weight as of this encounter: 276 lb (125.2 kg).      Weight management plan: Discussed healthy diet and exercise guidelines and patient will follow up in 12 months in clinic to re-evaluate.     reports that he has never smoked. He has never used smokeless tobacco.      Counseling Resources:  ATP IV Guidelines  Pooled Cohorts Equation Calculator  FRAX Risk Assessment  ICSI Preventive Guidelines  Dietary Guidelines for Americans, 2010  USDA's MyPlate  ASA Prophylaxis  Lung CA Screening    Leyla Underwood PA-C  [facility]  "

## 2018-10-16 NOTE — MR AVS SNAPSHOT
"              After Visit Summary   10/16/2018    Thomas Pierce    MRN: [id number]           Patient Information     Date Of Birth          1970        Visit Information        Provider Department      10/16/2018 10:40 AM Leyla Underwood PA-C [facility] [place]        Today's Diagnoses     Encounter for routine adult health examination with abnormal findings    -  1    Hypertension goal BP (blood pressure) < 140/90        Hyperlipidemia LDL goal <160        Elevated LFTs        Prediabetes        Severe obesity (BMI 35.0-39.9) with comorbidity (H)        Type 2 diabetes mellitus without complication, without long-term current use of insulin (H)        Screening for prostate cancer        Special screening for malignant neoplasms, colon        Skin lesion - 5x5mm dark brown/black cental mid back. Has had for \"years.\"          Care Instructions      Preventive Health Recommendations  Male Ages 50 - 64    Yearly exam:             See your health care provider every year in order to  o   Review health changes.   o   Discuss preventive care.    o   Review your medicines if your doctor has prescribed any.     Have a cholesterol test every 5 years, or more frequently if you are at risk for high cholesterol/heart disease.     Have a diabetes test (fasting glucose) every three years. If you are at risk for diabetes, you should have this test more often.     Have a colonoscopy at age 50, or have a yearly FIT test (stool test). These exams will check for colon cancer.      Talk with your health care provider about whether or not a prostate cancer screening test (PSA) is right for you.    You should be tested each year for STDs (sexually transmitted diseases), if you re at risk.     Shots: Get a flu shot each year. Get a tetanus shot every 10 years.     Nutrition:    Eat at least 5 servings of fruits and vegetables daily.     Eat whole-grain bread, whole-wheat pasta and brown rice instead of white " grains and rice.     Get adequate Calcium and Vitamin D.     Lifestyle    Exercise for at least 150 minutes a week (30 minutes a day, 5 days a week). This will help you control your weight and prevent disease.     Limit alcohol to one drink per day.     No smoking.     Wear sunscreen to prevent skin cancer.     See your dentist every six months for an exam and cleaning.     See your eye doctor every 1 to 2 years.            Follow-ups after your visit        Follow-up notes from your care team     Return in about 6 months (around 4/16/2019) for Diabetes visit.      Future tests that were ordered for you today     Open Future Orders        Priority Expected Expires Ordered    Fecal colorectal cancer screen (FIT) Routine 11/6/2018 1/8/2019 10/16/2018            Who to contact     If you have questions or need follow up information about today's clinic visit or your schedule please contact [facility] [place] directly at [phone].  Normal or non-critical lab and imaging results will be communicated to you by [employer], letter or phone within 4 business days after the clinic has received the results. If you do not hear from us within 7 days, please contact the clinic through [employer] or phone. If you have a critical or abnormal lab result, we will notify you by phone as soon as possible.  Submit refill requests through [employer] or call your pharmacy and they will forward the refill request to us. Please allow 3 business days for your refill to be completed.          Additional Information About Your Visit        [employer] Information     [employer] gives you secure access to your electronic health record. If you see a primary care provider, you can also send messages to your care team and make appointments. If you have questions, please call your primary care clinic.  If you do not have a primary care provider, please call [phone] and they will assist you.        Care EveryWhere ID     This is your Care EveryWhere  ID. This could be used by other organizations to access your [facility] medical records  [id number]        Your Vitals Were     Pulse Temperature Height Pulse Oximetry BMI (Body Mass Index)       114 98.3  F (36.8  C) (Oral) 6' (1.829 m) 97% 37.43 kg/m2        Blood Pressure from Last 3 Encounters:   10/16/18 122/68   03/21/18 124/78   01/16/18 130/86    Weight from Last 3 Encounters:   10/16/18 276 lb (125.2 kg)   10/30/17 283 lb 2 oz (128.4 kg)   04/27/15 208 lb (94.3 kg)              We Performed the Following     Albumin Random Urine Quantitative with Creat Ratio     Comprehensive metabolic panel     FOOT EXAM     Hemoglobin A1c     Lipid panel reflex to direct LDL Fasting     PSA, screen     TSH with free T4 reflex          Today's Medication Changes          These changes are accurate as of 10/16/18 11:32 AM.  If you have any questions, ask your nurse or doctor.               Stop taking these medicines if you haven't already. Please contact your care team if you have questions.     ibuprofen 200 MG tablet   Commonly known as:  ADVIL/MOTRIN   Stopped by:  Leyla Underwood PA-C                Where to get your medicines      These medications were sent to [facility] [id number] [place] [address] - [address] AT [facility] [address]  [address], [facility] [address]     Phone:  [phone]     chlorthalidone 25 MG tablet    lisinopril 40 MG tablet    simvastatin 40 MG tablet                Primary Care Provider Office Phone # Fax #    [facility] [phone] [address]  [place] [address]        Equal Access to Services     SAPNA MCKEON AH: Hadii aad ku hadasho Soomaali, waaxda luqadaha, qaybta kaalmada adeegyada, nga bates. So [facility] [phone].    ATENCIÓN: Si habla español, tiene a tripathi disposición servicios gratuitos de asistencia lingüística. Llame al [phone].    We comply with applicable federal  civil rights laws and [place] laws. We do not discriminate on the basis of race, color, national origin, age, disability, sex, sexual orientation, or gender identity.            Thank you!     Thank you for choosing [facility] [place]  for your care. Our goal is always to provide you with excellent care. Hearing back from our patients is one way we can continue to improve our services. Please take a few minutes to complete the written survey that you may receive in the mail after your visit with us. Thank you!             Your Updated Medication List - Protect others around you: Learn how to safely use, store and throw away your medicines at www.disposemymeds.org.          This list is accurate as of 10/16/18 11:32 AM.  Always use your most recent med list.                   Brand Name Dispense Instructions for use Diagnosis    aspirin 81 MG tablet      Take 1 tablet by mouth daily.        chlorthalidone 25 MG tablet    HYGROTON    90 tablet    TAKE 1 TABLET(25 MG) BY MOUTH DAILY    Hypertension goal BP (blood pressure) < 140/90       lisinopril 40 MG tablet    PRINIVIL/ZESTRIL    90 tablet    TAKE 1 TABLET(40 MG) BY MOUTH DAILY    Hypertension goal BP (blood pressure) < 140/90       simvastatin 40 MG tablet    ZOCOR    90 tablet    Take 1 tablet (40 mg) by mouth At Bedtime    Hyperlipidemia LDL goal <160

## 2018-10-17 LAB
ALBUMIN SERPL-MCNC: 4.2 G/DL (ref 3.4–5)
ALP SERPL-CCNC: 66 U/L (ref 40–150)
ALT SERPL W P-5'-P-CCNC: 82 U/L (ref 0–70)
ANION GAP SERPL CALCULATED.3IONS-SCNC: 9 MMOL/L (ref 3–14)
AST SERPL W P-5'-P-CCNC: 40 U/L (ref 0–45)
BILIRUB SERPL-MCNC: 0.6 MG/DL (ref 0.2–1.3)
BUN SERPL-MCNC: 13 MG/DL (ref 7–30)
CALCIUM SERPL-MCNC: 9 MG/DL (ref 8.5–10.1)
CHLORIDE SERPL-SCNC: 102 MMOL/L (ref 94–109)
CHOLEST SERPL-MCNC: 256 MG/DL
CO2 SERPL-SCNC: 25 MMOL/L (ref 20–32)
CREAT SERPL-MCNC: 0.86 MG/DL (ref 0.66–1.25)
CREAT UR-MCNC: 6 MG/DL
GFR SERPL CREATININE-BSD FRML MDRD: >90 ML/MIN/1.7M2
GLUCOSE SERPL-MCNC: 104 MG/DL (ref 70–99)
HDLC SERPL-MCNC: 47 MG/DL
LDLC SERPL CALC-MCNC: 171 MG/DL
MICROALBUMIN UR-MCNC: 11 MG/L
MICROALBUMIN/CREAT UR: 185.06 MG/G CR (ref 0–17)
NONHDLC SERPL-MCNC: 209 MG/DL
POTASSIUM SERPL-SCNC: 3.6 MMOL/L (ref 3.4–5.3)
PROT SERPL-MCNC: 7.8 G/DL (ref 6.8–8.8)
PSA SERPL-ACNC: 0.68 UG/L (ref 0–4)
SODIUM SERPL-SCNC: 136 MMOL/L (ref 133–144)
TRIGL SERPL-MCNC: 191 MG/DL
TSH SERPL DL<=0.005 MIU/L-ACNC: 2.2 MU/L (ref 0.4–4)

## 2018-10-21 NOTE — PROGRESS NOTES
Please call pt and set-up telephone appt to discuss labs.  Electronically Signed By: Leyla Underwood PA-C

## 2018-10-23 ENCOUNTER — VIRTUAL VISIT (OUTPATIENT)
Dept: FAMILY MEDICINE | Facility: CLINIC | Age: 48
End: 2018-10-23
Payer: COMMERCIAL

## 2018-10-23 DIAGNOSIS — R79.89 ELEVATED LFTS: ICD-10-CM

## 2018-10-23 DIAGNOSIS — E11.9 TYPE 2 DIABETES MELLITUS WITHOUT COMPLICATION, WITHOUT LONG-TERM CURRENT USE OF INSULIN (H): Primary | ICD-10-CM

## 2018-10-23 DIAGNOSIS — E78.5 HYPERLIPIDEMIA LDL GOAL <100: ICD-10-CM

## 2018-10-23 PROCEDURE — 98967 PH1 ASSMT&MGMT NQHP 11-20: CPT | Performed by: PHYSICIAN ASSISTANT

## 2018-10-23 NOTE — PROGRESS NOTES
"Thomas Pierce is a 48 year old male who is being evaluated via a billable telephone visit.      The patient has been notified of following:     \"This telephone visit will be conducted via a call between you and your physician/provider. We have found that certain health care needs can be provided without the need for a physical exam.  This service lets us provide the care you need with a short phone conversation.  If a prescription is necessary we can send it directly to your pharmacy.  If lab work is needed we can place an order for that and you can then stop by our lab to have the test done at a later time.    If during the course of the call the physician/provider feels a telephone visit is not appropriate, you will not be charged for this service.\"     Consent has been obtained for this service by 1 care team member: yes. See the scanned image in the medical record.    Thomas Pierce complains of    Chief Complaint   Patient presents with     Results       I have reviewed and updated the patient's Past Medical History, Social History, Family History and Medication List.    ALLERGIES  Review of patient's allergies indicates no known allergies.    Jennifer Mosqueda MA        Additional provider notes:  Advised follow-up tele visit to review labs as noted below:  Results for orders placed or performed in visit on 10/16/18   Hemoglobin A1c   Result Value Ref Range    Hemoglobin A1C 7.0 (H) 0 - 5.6 %   Lipid panel reflex to direct LDL Fasting   Result Value Ref Range    Cholesterol 256 (H) <200 mg/dL    Triglycerides 191 (H) <150 mg/dL    HDL Cholesterol 47 >39 mg/dL    LDL Cholesterol Calculated 171 (H) <100 mg/dL    Non HDL Cholesterol 209 (H) <130 mg/dL   Albumin Random Urine Quantitative with Creat Ratio   Result Value Ref Range    Creatinine Urine 6 mg/dL    Albumin Urine mg/L 11 mg/L    Albumin Urine mg/g Cr 185.06 (H) 0 - 17 mg/g Cr   Comprehensive metabolic panel   Result Value Ref Range    " Sodium 136 133 - 144 mmol/L    Potassium 3.6 3.4 - 5.3 mmol/L    Chloride 102 94 - 109 mmol/L    Carbon Dioxide 25 20 - 32 mmol/L    Anion Gap 9 3 - 14 mmol/L    Glucose 104 (H) 70 - 99 mg/dL    Urea Nitrogen 13 7 - 30 mg/dL    Creatinine 0.86 0.66 - 1.25 mg/dL    GFR Estimate >90 >60 mL/min/1.7m2    GFR Estimate If Black >90 >60 mL/min/1.7m2    Calcium 9.0 8.5 - 10.1 mg/dL    Bilirubin Total 0.6 0.2 - 1.3 mg/dL    Albumin 4.2 3.4 - 5.0 g/dL    Protein Total 7.8 6.8 - 8.8 g/dL    Alkaline Phosphatase 66 40 - 150 U/L    ALT 82 (H) 0 - 70 U/L    AST 40 0 - 45 U/L   TSH with free T4 reflex   Result Value Ref Range    TSH 2.20 0.40 - 4.00 mU/L   PSA, screen   Result Value Ref Range    PSA 0.68 0 - 4 ug/L     Worsening DM control and pt amenable to getting new meter and starting metformin. Also encouraged to see DM educator whenever he can fit this.. 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 Stressed importance of maintaining good DM control especially in light of microalbuminuria. Already on ACEI and statin therapy.   Also reviewed mildly elevated ALT - reviewed possible origins including obesity, OTC med use like tylenol, alcohol use, inflammation and infection. Pt admits he brews his own mead and has bottle of wine once every couple of weeks or so, but not daily. Advised alcohol cessation to can repeat LFTs when next due for a1c. Advised recheck with me in 3 months. Consider repeat lipids at time of same due to elevation as well.    Assessment/Plan:    ICD-10-CM    1. Type 2 diabetes mellitus without complication, without long-term current use of insulin (H) E11.9 blood glucose monitoring (NO BRAND SPECIFIED) meter device kit     blood glucose monitoring (NO BRAND SPECIFIED) test strip     blood glucose (NO BRAND SPECIFIED) lancing device     metFORMIN (GLUCOPHAGE-XR) 500 MG 24 hr tablet     DIABETES EDUCATOR REFERRAL   2. Elevated LFTs R94.5    3. Hyperlipidemia LDL goal <100 E78.5    see notes  above.    I have reviewed the note as documented above.  This accurately captures the substance of my conversation with the patient.      Total time of call between patient and provider was 13 minutes

## 2018-10-23 NOTE — MR AVS SNAPSHOT
After Visit Summary   10/23/2018    Thomas Pierce    MRN: [id number]           Patient Information     Date Of Birth          1970        Visit Information        Provider Department      10/23/2018 4:20 PM Leyla Underwood PA-C [facility] [place]        Today's Diagnoses     Type 2 diabetes mellitus without complication, without long-term current use of insulin (H)    -  1    Elevated LFTs        Hyperlipidemia LDL goal <100           Follow-ups after your visit        Additional Services     DIABETES EDUCATOR REFERRAL       DIABETES SELF MANAGEMENT TRAINING (DSMT)      Your provider has referred you to Diabetes Education: [facility]: Diabetes Education - All [facility] [phone]   [email or web address]     If an urgent visit is needed or A1C is above 12, Care Team to call the Diabetes  Education Team at [phone] or send an In Basket message to the Diabetes Education Pool (P DIAB ED-PATIENT CARE).    A  will call you to make your appointment. If it has been more than 3 business days since your referral was placed, please call the above phone number to schedule.    Type of training and number of hours: Previous Diagnosis: Follow-up DSMT - 2 hours.    Diabetes Type: Type 2 - On Oral Medication   Medicare covers: 10 hours of initial DSMT in 12 month period from the time of first visit, plus 2 hours of follow-up DSMT annually, and additional hours as requested for insulin training.         Diabetes Co-Morbidities: hypertension and obesity               A1C Goal:  <7.0       A1C is: Lab Results       Component                Value               Date                       A1C                      7.0                 10/16/2018              MEDICAL NUTRITION THERAPY (MNT) for Diabetes    Medical Nutrition Therapy with a Registered Dietitian can be provided in coordination with Diabetes Self-Management Training to  assist in achieving optimal diabetes management.    MNT Type and Hours: Previous diagnosis: Annual follow-up MNT - 2 hours                       Medicare will cover: 3 hours initial MNT in 12 month period after first visit, plus 2 hours of follow-up MNT annually        Diabetes Education Topics: Comprehensive Knowledge Assessment and Instruction    Special Educational Needs Requiring Individual DSMT: None      Please be aware that coverage of these services is subject to the terms and limitations of your health insurance plan.  Call member services at your health plan to determine Diabetes Self-Management Training (Codes  and ) and Medical Nutrition Therapy (Codes 48425 and 84799) benefits and ask which blood glucose monitor brands are covered by your plan.  Please bring the following with you to your appointment:    (1)  List of current medications   (2)  List of Blood Glucose Monitor brands that are covered by your insurance plan  (3)  Blood Glucose Monitor and log book  (4)   Food records for the 3 days prior to your visit    The Certified Diabetes Educator may make diabetes medication adjustments per the CDE Protocol and Collaborative Practice Agreement.                  Who to contact     If you have questions or need follow up information about today's clinic visit or your schedule please contact [facility] [place] directly at [phone].  Normal or non-critical lab and imaging results will be communicated to you by MyChart, letter or phone within 4 business days after the clinic has received the results. If you do not hear from us within 7 days, please contact the clinic through MyChart or phone. If you have a critical or abnormal lab result, we will notify you by phone as soon as possible.  Submit refill requests through [employer] or call your pharmacy and they will forward the refill request to us. Please allow 3 business days for your refill to be completed.          Additional Information  About Your Visit        [employer] Information     [employer] gives you secure access to your electronic health record. If you see a primary care provider, you can also send messages to your care team and make appointments. If you have questions, please call your primary care clinic.  If you do not have a primary care provider, please call [phone] and they will assist you.        Care EveryWhere ID     This is your Care EveryWhere ID. This could be used by other organizations to access your [place] medical records  [id number]         Blood Pressure from Last 3 Encounters:   10/16/18 122/68   03/21/18 124/78   01/16/18 130/86    Weight from Last 3 Encounters:   10/16/18 276 lb (125.2 kg)   10/30/17 283 lb 2 oz (128.4 kg)   04/27/15 208 lb (94.3 kg)              We Performed the Following     DIABETES EDUCATOR REFERRAL          Today's Medication Changes          These changes are accurate as of 10/23/18 11:59 PM.  If you have any questions, ask your nurse or doctor.               Start taking these medicines.        Dose/Directions    blood glucose lancing device   Commonly known as:  no brand specified   Used for:  Type 2 diabetes mellitus without complication, without long-term current use of insulin (H)   Started by:  Leyla Underwood PA-C        Use to test blood sugars 2-4 times daily or as directed.   Quantity:  1 each   Refills:  3       blood glucose monitoring meter device kit   Commonly known as:  no brand specified   Used for:  Type 2 diabetes mellitus without complication, without long-term current use of insulin (H)   Started by:  Leyla Underwood PA-C        Use to test blood sugar 2-4 times daily or as directed.   Quantity:  1 kit   Refills:  0       blood glucose monitoring test strip   Commonly known as:  no brand specified   Used for:  Type 2 diabetes mellitus without complication, without long-term current use of insulin (H)   Started by:  Leyla Underwood PA-C         Use to test blood sugars 2-4 times daily or as directed   Quantity:  100 strip   Refills:  3       metFORMIN 500 MG 24 hr tablet   Commonly known as:  GLUCOPHAGE-XR   Used for:  Type 2 diabetes mellitus without complication, without long-term current use of insulin (H)   Started by:  Leyla Underwood PA-C        Dose:  500 mg   Take 1 tablet (500 mg) by mouth daily (with dinner)   Quantity:  90 tablet   Refills:  3            Where to get your medicines      These medications were sent to [id number] - [place], [place] - [address] AT [facility] [address]  [address], [place] [address]     Phone:  [phone]     blood glucose lancing device    blood glucose monitoring meter device kit    blood glucose monitoring test strip    metFORMIN 500 MG 24 hr tablet                Primary Care Provider Office Phone # Fax #    [facility] [phone] [address]  [place] [address]        Equal Access to Services     ALETHA MCKEON AH: Hadii aad ku hadasho Soomaali, waaxda luqadaha, qaybta kaalmada adeegyada, waxay idiin hayaan joel khdangelo flowers . So [facility] [phone].    ATENCIÓN: Si habla español, tiene a tripathi disposición servicios gratuitos de asistencia lingüística. Llame al [phone].    We comply with applicable federal civil rights laws and [place] laws. We do not discriminate on the basis of race, color, national origin, age, disability, sex, sexual orientation, or gender identity.            Thank you!     Thank you for choosing [facility]  for your care. Our goal is always to provide you with excellent care. Hearing back from our patients is one way we can continue to improve our services. Please take a few minutes to complete the written survey that you may receive in the mail after your visit with us. Thank you!             Your Updated Medication List - Protect others around you: Learn how to safely use,  store and throw away your medicines at www.disposemymeds.org.          This list is accurate as of 10/23/18 11:59 PM.  Always use your most recent med list.                   Brand Name Dispense Instructions for use Diagnosis    aspirin 81 MG tablet      Take 1 tablet by mouth daily.        blood glucose lancing device    no brand specified    1 each    Use to test blood sugars 2-4 times daily or as directed.    Type 2 diabetes mellitus without complication, without long-term current use of insulin (H)       blood glucose monitoring meter device kit    no brand specified    1 kit    Use to test blood sugar 2-4 times daily or as directed.    Type 2 diabetes mellitus without complication, without long-term current use of insulin (H)       blood glucose monitoring test strip    no brand specified    100 strip    Use to test blood sugars 2-4 times daily or as directed    Type 2 diabetes mellitus without complication, without long-term current use of insulin (H)       chlorthalidone 25 MG tablet    HYGROTON    90 tablet    TAKE 1 TABLET(25 MG) BY MOUTH DAILY    Hypertension goal BP (blood pressure) < 140/90       lisinopril 40 MG tablet    PRINIVIL/ZESTRIL    90 tablet    TAKE 1 TABLET(40 MG) BY MOUTH DAILY    Hypertension goal BP (blood pressure) < 140/90       metFORMIN 500 MG 24 hr tablet    GLUCOPHAGE-XR    90 tablet    Take 1 tablet (500 mg) by mouth daily (with dinner)    Type 2 diabetes mellitus without complication, without long-term current use of insulin (H)       simvastatin 40 MG tablet    ZOCOR    90 tablet    Take 1 tablet (40 mg) by mouth At Bedtime    Hyperlipidemia LDL goal <160

## 2018-10-25 PROBLEM — E78.5 HYPERLIPIDEMIA LDL GOAL <100: Status: ACTIVE | Noted: 2018-10-25

## 2018-10-25 RX ORDER — LANCING DEVICE
EACH MISCELLANEOUS
Qty: 1 EACH | Refills: 3 | Status: SHIPPED | OUTPATIENT
Start: 2018-10-25 | End: 2018-10-29

## 2018-10-25 RX ORDER — METFORMIN HCL 500 MG
500 TABLET, EXTENDED RELEASE 24 HR ORAL
Qty: 90 TABLET | Refills: 3 | Status: SHIPPED | OUTPATIENT
Start: 2018-10-25 | End: 2019-11-04

## 2018-10-29 ENCOUNTER — TELEPHONE (OUTPATIENT)
Dept: FAMILY MEDICINE | Facility: CLINIC | Age: 48
End: 2018-10-29

## 2018-10-29 DIAGNOSIS — E11.9 TYPE 2 DIABETES MELLITUS WITHOUT COMPLICATION, WITHOUT LONG-TERM CURRENT USE OF INSULIN (H): ICD-10-CM

## 2018-10-29 NOTE — TELEPHONE ENCOUNTER
Per Pharmacy:  We would like to request a new Rx specifically for lancets to use with his other supplies.  The Rx needs to say lancets vs. lancing device for insurance purposes.  Please send us a new Rx with directions.

## 2018-10-30 ENCOUNTER — MYC MEDICAL ADVICE (OUTPATIENT)
Dept: FAMILY MEDICINE | Facility: CLINIC | Age: 48
End: 2018-10-30

## 2018-10-30 DIAGNOSIS — E11.9 TYPE 2 DIABETES MELLITUS WITHOUT COMPLICATION, WITHOUT LONG-TERM CURRENT USE OF INSULIN (H): ICD-10-CM

## 2018-10-30 RX ORDER — BLOOD-GLUCOSE CONTROL, NORMAL
EACH MISCELLANEOUS
Qty: 1 EACH | Refills: 1 | Status: SHIPPED | OUTPATIENT
Start: 2018-10-30 | End: 2020-05-07

## 2019-01-29 ENCOUNTER — OFFICE VISIT (OUTPATIENT)
Dept: FAMILY MEDICINE | Facility: CLINIC | Age: 49
End: 2019-01-29
Payer: COMMERCIAL

## 2019-01-29 VITALS
OXYGEN SATURATION: 96 % | SYSTOLIC BLOOD PRESSURE: 102 MMHG | HEIGHT: 72 IN | DIASTOLIC BLOOD PRESSURE: 62 MMHG | HEART RATE: 81 BPM | TEMPERATURE: 97.7 F | WEIGHT: 262 LBS | BODY MASS INDEX: 35.49 KG/M2

## 2019-01-29 DIAGNOSIS — R74.8 ELEVATED LIVER ENZYMES: ICD-10-CM

## 2019-01-29 DIAGNOSIS — E78.5 HYPERLIPIDEMIA LDL GOAL <100: ICD-10-CM

## 2019-01-29 DIAGNOSIS — E11.9 TYPE 2 DIABETES MELLITUS WITHOUT COMPLICATION, WITHOUT LONG-TERM CURRENT USE OF INSULIN (H): Primary | ICD-10-CM

## 2019-01-29 LAB
ALT SERPL W P-5'-P-CCNC: 47 U/L (ref 0–70)
CHOLEST SERPL-MCNC: 204 MG/DL
HBA1C MFR BLD: 6.1 % (ref 0–5.6)
HDLC SERPL-MCNC: 45 MG/DL
LDLC SERPL CALC-MCNC: 125 MG/DL
NONHDLC SERPL-MCNC: 159 MG/DL
TRIGL SERPL-MCNC: 168 MG/DL

## 2019-01-29 PROCEDURE — 83036 HEMOGLOBIN GLYCOSYLATED A1C: CPT | Performed by: PHYSICIAN ASSISTANT

## 2019-01-29 PROCEDURE — 84460 ALANINE AMINO (ALT) (SGPT): CPT | Performed by: PHYSICIAN ASSISTANT

## 2019-01-29 PROCEDURE — 36415 COLL VENOUS BLD VENIPUNCTURE: CPT | Performed by: PHYSICIAN ASSISTANT

## 2019-01-29 PROCEDURE — 99213 OFFICE O/P EST LOW 20 MIN: CPT | Performed by: PHYSICIAN ASSISTANT

## 2019-01-29 PROCEDURE — 80061 LIPID PANEL: CPT | Performed by: PHYSICIAN ASSISTANT

## 2019-01-29 ASSESSMENT — MIFFLIN-ST. JEOR: SCORE: 2086.42

## 2019-01-29 NOTE — PATIENT INSTRUCTIONS
Way to go with making some positive changes!  Recheck labs and notify of results.  BS sound improved so a1c likely to be better than previous as well.

## 2019-01-29 NOTE — PROGRESS NOTES
SUBJECTIVE:   Thomas Pierce is a 48 year old male who presents to clinic today for the following health issues:      Diabetes Follow-up; has lost about 20 lbs from 2017. Pt states he tries to watch what he eats sometimes.  Happy to report that he hasn't had any alcohol for the past 3 months excluding Thanksgiving.   Never had a lot in one setting and times in the past where he didn't have anything for a lot time so feels he will have no problems continuing to be mindful of his intake.   Home-made mead is only when they usually go through the whole bottle. Otherwise, he will only have a drink a couple times per month.  Tolerating metformin well without any issues.   Has tried to cut out as many sandwiches and french fries.   Has never been able to get in with the DM educator since wanted wife to go and whenever it worked for her, it didn't work him.       Patient is checking blood sugars: periodically - range from 110-140. Reports when he initially started checking his BS was 173. Has nights     Diabetic concerns: None     Symptoms of hypoglycemia (low blood sugar): none     Paresthesias (numbness or burning in feet) or sores: No     Date of last diabetic eye exam: believes he's almost due - last was at [facility].        BP Readings from Last 2 Encounters:   10/16/18 122/68   03/21/18 124/78     Hemoglobin A1C (%)   Date Value   10/16/2018 7.0 (H)   10/30/2017 6.1 (H)     LDL Cholesterol Calculated (mg/dL)   Date Value   10/16/2018 171 (H)   10/30/2017 184 (H)       Diabetes Management Resources    Amount of exercise or physical activity: 0     Problems taking medications regularly: No    Medication side effects: none    Diet: regular (no restrictions)        Problem list and histories reviewed & adjusted, as indicated.  Additional history: as documented    Patient Active Problem List   Diagnosis     Hypertension goal BP (blood pressure) < 140/90     Elevated LFTs     Severe obesity (BMI  35.0-39.9) with comorbidity (H)     Type 2 diabetes mellitus without complication, without long-term current use of insulin (H)     Hyperlipidemia LDL goal <100     Past Surgical History:   Procedure Laterality Date     MANDIBLE SURGERY         Social History     Tobacco Use     Smoking status: Never Smoker     Smokeless tobacco: Never Used   Substance Use Topics     Alcohol use: Yes     Comment: occasionally      Family History   Problem Relation Age of Onset     Diabetes Mother         Type II     Cardiovascular Maternal Grandmother         PAD     Hepatitis Maternal Grandmother      Colon Polyps Cousin      Ovarian Cancer Maternal Aunt      Colon Cancer No family hx of      Prostate Cancer No family hx of          Current Outpatient Medications   Medication Sig Dispense Refill     aspirin 81 MG tablet Take 1 tablet by mouth daily.       blood glucose (NO BRAND SPECIFIED) lancets standard Use to test blood sugar 2-4 times daily or as directed. 100 each 3     blood glucose calibration (NO BRAND SPECIFIED) solution Use to calibrate blood glucose monitor as directed. 1 each 1     blood glucose monitoring (NO BRAND SPECIFIED) meter device kit Use to test blood sugar 2-4 times daily or as directed. 1 kit 0     blood glucose monitoring (NO BRAND SPECIFIED) test strip Use to test blood sugars 2-4 times daily or as directed 100 strip 3     chlorthalidone (HYGROTON) 25 MG tablet TAKE 1 TABLET(25 MG) BY MOUTH DAILY 90 tablet 3     lisinopril (PRINIVIL/ZESTRIL) 40 MG tablet TAKE 1 TABLET(40 MG) BY MOUTH DAILY 90 tablet 3     metFORMIN (GLUCOPHAGE-XR) 500 MG 24 hr tablet Take 1 tablet (500 mg) by mouth daily (with dinner) 90 tablet 3     simvastatin (ZOCOR) 40 MG tablet Take 1 tablet (40 mg) by mouth At Bedtime 90 tablet 3     No Known Allergies  Recent Labs   Lab Test 10/16/18  1142 10/30/17  1055 04/27/15  1053 12/29/14  0845   A1C 7.0* 6.1* 5.8 5.5   * 184*  --  91   HDL 47 53  --  52   TRIG 191* 181*  --  64   ALT  82* 83* 23  --    CR 0.86 0.80 0.82  --    GFRESTIMATED >90 >90 >90  Non African American GFR Calc    --    GFRESTBLACK >90 >90 >90  African American GFR Calc    --    POTASSIUM 3.6 4.0 4.6  --    TSH 2.20  --   --  2.19        Reviewed and updated as needed this visit by clinical staff       Reviewed and updated as needed this visit by Provider         ROS:  Constitutional, HEENT, cardiovascular, pulmonary, gi and gu systems are negative, except as otherwise noted.    OBJECTIVE:     /62 (BP Location: Right arm, Cuff Size: Adult Large)   Pulse 81   Temp 97.7  F (36.5  C) (Oral)   Ht 1.829 m (6')   Wt 118.8 kg (262 lb)   SpO2 96%   BMI 35.53 kg/m    Body mass index is 35.53 kg/m .  GENERAL: healthy, alert and no distress  EYES: Eyes grossly normal to inspection, PERRL and conjunctivae and sclerae normal  RESP: lungs clear to auscultation - no rales, rhonchi or wheezes  CV: regular rates and rhythm, no murmur, click or rub, peripheral pulses strong and no peripheral edema    Diagnostic Test Results:  none     ASSESSMENT/PLAN:       ICD-10-CM    1. Type 2 diabetes mellitus without complication, without long-term current use of insulin (H) E11.9 Hemoglobin A1c   2. Elevated liver enzymes R74.8 ALT   3. Hyperlipidemia LDL goal <100 E78.5 Lipid panel reflex to direct LDL Fasting   See Patient Instructions  Pt in agreement with plan.  Encouraged to complete FIT Test for colon cancer screening and to have annual eye exam as well.   Pt also to try to schedule with DM educator when he has time as well.     Patient Instructions   Way to go with making some positive changes!  Recheck labs and notify of results.  BS sound improved so a1c likely to be better than previous as well.    Leyla Underwood PA-C  [facility]

## 2019-01-30 NOTE — RESULT ENCOUNTER NOTE
Dear Thomas,      Your recent test results are noted below:    -Cholesterol levels have improved from previous 3 months ago. Current treatment guidelines recommend that your bad cholesterol (LDL) be lowered to a goal level of <100 in light of your diabetes being a significant risk factor for cardiovascular disease. Have you been taking your simvastatin consistently? We can repeat this again the next time you're due for your a1c, but if it continues to be elevated we may need to consider switching you to a high-intensity statin like atorvastatin 40-80mg daily.  ADVISE: continuing your medication, a regular exercise program with at least 150 minutes of aerobic exercise per week, and eating a low saturated fat/low carbohydrate diet.  Also, you should recheck this fasting cholesterol panel in 6 months.  -A1C (test of diabetes control the last 2-3 months) has significantly improved with life-style changes and addition of metformin. Keep up the good work! Please continue with your current plan. Also, you should make an appointment to see me and recheck your A1C test in 6 months.   -Liver test (ALT) is normal.    For additional lab test information, [email or web address] is an excellent reference. Please contact the clinic at [phone] with any further questions or concerns.    Sincerely,      Leyla Underwood PA-C  [facility]

## 2019-04-23 ENCOUNTER — OFFICE VISIT (OUTPATIENT)
Dept: FAMILY MEDICINE | Facility: CLINIC | Age: 49
End: 2019-04-23
Payer: COMMERCIAL

## 2019-04-23 VITALS
BODY MASS INDEX: 35.35 KG/M2 | HEART RATE: 92 BPM | SYSTOLIC BLOOD PRESSURE: 120 MMHG | TEMPERATURE: 98.1 F | WEIGHT: 261 LBS | DIASTOLIC BLOOD PRESSURE: 78 MMHG | OXYGEN SATURATION: 95 % | HEIGHT: 72 IN

## 2019-04-23 DIAGNOSIS — M25.561 ACUTE PAIN OF RIGHT KNEE: Primary | ICD-10-CM

## 2019-04-23 PROCEDURE — 99213 OFFICE O/P EST LOW 20 MIN: CPT | Performed by: PHYSICIAN ASSISTANT

## 2019-04-23 ASSESSMENT — MIFFLIN-ST. JEOR: SCORE: 2076.89

## 2019-04-23 NOTE — PATIENT INSTRUCTIONS
"Unclear if this was some medial knee strain versus tendonitis.  Differential includes possible meniscal injury as unclear if that episode where knee \"felt funny\" 1 yr ago would be related.   Could not reproduce this on exam today though and given you're feeling better, ok to proceed with watchful waiting/obesrvation.  If regresses or is persistent, would consider ortho consult. Ok to call if you'd like to pursue this.   "

## 2019-04-23 NOTE — PROGRESS NOTES
"  SUBJECTIVE:   Thomas Pierce is a 49 year old male who presents to clinic today for the following   health issues:      Joint Pain;     Onset: about 1 yr ago had an episode where his R knee \"felt funny\" and felt something happen, but not sure if he actually heard a pop or snap. No long term impact of this excluding he noticed it was harder to cross his legs when sitting on the floor. Admits had may occasional pain, but nothing that ever persisted.    1 week ago then had \"really stiff and achy\" discomfort which persistent through to this weekend. Any slight twisting motion with his knee or pushing his chair in with his foot would hurt - 6-7/10 pain at it's worst.     Then Sunday, dog bumped into his knee and since that time he has felt progressively better since. Denies stiffness and     No changes in activity level preceding this.     At this point he can tell \"there's something there\", but it's not bothersome to him like it was last week.       Description:   Location: right knee  Character: stiffness and soreness - can walk ago but full straightness or bending it completely hurts more    Intensity: moderate    Progression of Symptoms: same    Accompanying Signs & Symptoms:  Other symptoms: no swelling that he's been aware of    History:   Previous similar pain: no prior injuries that were major, no knee surgery.      Precipitating factors:   Trauma or overuse: no     Alleviating factors:  Improved by: time    Therapies Tried and outcome: none      Additional history: as documented    Reviewed  and updated as needed this visit by clinical staff         Reviewed and updated as needed this visit by Provider         Patient Active Problem List   Diagnosis     Hypertension goal BP (blood pressure) < 140/90     Elevated LFTs     Severe obesity (BMI 35.0-39.9) with comorbidity (H)     Type 2 diabetes mellitus without complication, without long-term current use of insulin (H)     Hyperlipidemia LDL goal <100     " Past Surgical History:   Procedure Laterality Date     MANDIBLE SURGERY         Social History     Tobacco Use     Smoking status: Never Smoker     Smokeless tobacco: Never Used   Substance Use Topics     Alcohol use: Yes     Comment: occasionally      Family History   Problem Relation Age of Onset     Diabetes Mother         Type II     Cardiovascular Maternal Grandmother         PAD     Hepatitis Maternal Grandmother      Colon Polyps Cousin      Ovarian Cancer Maternal Aunt      Colon Cancer No family hx of      Prostate Cancer No family hx of          Current Outpatient Medications   Medication Sig Dispense Refill     aspirin 81 MG tablet Take 1 tablet by mouth daily.       blood glucose (NO BRAND SPECIFIED) lancets standard Use to test blood sugar 2-4 times daily or as directed. 100 each 3     blood glucose calibration (NO BRAND SPECIFIED) solution Use to calibrate blood glucose monitor as directed. 1 each 1     blood glucose monitoring (NO BRAND SPECIFIED) meter device kit Use to test blood sugar 2-4 times daily or as directed. 1 kit 0     blood glucose monitoring (NO BRAND SPECIFIED) test strip Use to test blood sugars 2-4 times daily or as directed 100 strip 3     chlorthalidone (HYGROTON) 25 MG tablet TAKE 1 TABLET(25 MG) BY MOUTH DAILY 90 tablet 3     lisinopril (PRINIVIL/ZESTRIL) 40 MG tablet TAKE 1 TABLET(40 MG) BY MOUTH DAILY 90 tablet 3     metFORMIN (GLUCOPHAGE-XR) 500 MG 24 hr tablet Take 1 tablet (500 mg) by mouth daily (with dinner) 90 tablet 3     simvastatin (ZOCOR) 40 MG tablet Take 1 tablet (40 mg) by mouth At Bedtime 90 tablet 3     No Known Allergies      ROS:  Constitutional, MSK, integumentary, neuro systems are negative, except as otherwise noted.    OBJECTIVE:     /78 (BP Location: Right arm, Cuff Size: Adult Large)   Pulse 92   Temp 98.1  F (36.7  C) (Oral)   Ht 1.829 m (6')   Wt 118.4 kg (261 lb)   SpO2 95%   BMI 35.40 kg/m    Body mass index is 35.4 kg/m .  GENERAL:  "healthy, alert and no distress  MS: ambulates without antalgic gait. Has FROM of R knee without observed limitation or pain. No redness, warmth or swelling of R knee. Reports non-specific discomfort along medial side of knee, but I am unable to palpate exactly where pain emanates from. No medial/lateral joint line, patellar, pes anserine, or MCL TTP. Does have mild medial tenderness with MCL stressing, but no laxity. -Nathan and Thessaly testing. -Anterior Drawer. - Patellar grind.    Diagnostic Test Results:  none     ASSESSMENT/PLAN:       ICD-10-CM    1. Acute pain of right knee M25.561    Discussed xray versus ortho referral versus watchful waiting and pt will proceed with observation for now since he is better then 1 week ago and reports he almost didn't come for appt today as a result.  If worsening, told ok to let me know and we could re-assess or refer to ortho if he wishes.  See Patient Instructions  Patient in agreement with plan.     Patient Instructions   Unclear if this was some medial knee strain versus tendonitis.  Differential includes possible meniscal injury as unclear if that episode where knee \"felt funny\" 1 yr ago would be related.   Could not reproduce this on exam today though and given you're feeling better, ok to proceed with watchful waiting/obesrvation.  If regresses or is persistent, would consider ortho consult. Ok to call if you'd like to pursue this.     Leyla Underwood PA-C  [facility]        "

## 2019-10-03 ENCOUNTER — HEALTH MAINTENANCE LETTER (OUTPATIENT)
Age: 49
End: 2019-10-03

## 2019-11-04 ENCOUNTER — OFFICE VISIT (OUTPATIENT)
Dept: FAMILY MEDICINE | Facility: CLINIC | Age: 49
End: 2019-11-04
Payer: COMMERCIAL

## 2019-11-04 VITALS
HEART RATE: 89 BPM | HEIGHT: 72 IN | DIASTOLIC BLOOD PRESSURE: 78 MMHG | BODY MASS INDEX: 36.7 KG/M2 | OXYGEN SATURATION: 97 % | SYSTOLIC BLOOD PRESSURE: 118 MMHG | TEMPERATURE: 98.4 F | WEIGHT: 271 LBS

## 2019-11-04 DIAGNOSIS — E66.01 SEVERE OBESITY (BMI 35.0-39.9) WITH COMORBIDITY (H): ICD-10-CM

## 2019-11-04 DIAGNOSIS — E11.9 TYPE 2 DIABETES MELLITUS WITHOUT COMPLICATION, WITHOUT LONG-TERM CURRENT USE OF INSULIN (H): ICD-10-CM

## 2019-11-04 DIAGNOSIS — Z12.11 SPECIAL SCREENING FOR MALIGNANT NEOPLASMS, COLON: ICD-10-CM

## 2019-11-04 DIAGNOSIS — Z00.00 ROUTINE GENERAL MEDICAL EXAMINATION AT A HEALTH CARE FACILITY: Primary | ICD-10-CM

## 2019-11-04 DIAGNOSIS — I10 HYPERTENSION GOAL BP (BLOOD PRESSURE) < 140/90: ICD-10-CM

## 2019-11-04 DIAGNOSIS — Z12.5 SCREENING FOR PROSTATE CANCER: ICD-10-CM

## 2019-11-04 DIAGNOSIS — Z23 NEED FOR INFLUENZA VACCINATION: ICD-10-CM

## 2019-11-04 DIAGNOSIS — E78.5 HYPERLIPIDEMIA LDL GOAL <160: ICD-10-CM

## 2019-11-04 LAB — HBA1C MFR BLD: 6.4 % (ref 0–5.6)

## 2019-11-04 PROCEDURE — 90686 IIV4 VACC NO PRSV 0.5 ML IM: CPT | Performed by: PHYSICIAN ASSISTANT

## 2019-11-04 PROCEDURE — G0103 PSA SCREENING: HCPCS | Performed by: PHYSICIAN ASSISTANT

## 2019-11-04 PROCEDURE — 80061 LIPID PANEL: CPT | Performed by: PHYSICIAN ASSISTANT

## 2019-11-04 PROCEDURE — 99207 C FOOT EXAM  NO CHARGE: CPT | Mod: 25 | Performed by: PHYSICIAN ASSISTANT

## 2019-11-04 PROCEDURE — 90471 IMMUNIZATION ADMIN: CPT | Performed by: PHYSICIAN ASSISTANT

## 2019-11-04 PROCEDURE — 80053 COMPREHEN METABOLIC PANEL: CPT | Performed by: PHYSICIAN ASSISTANT

## 2019-11-04 PROCEDURE — 99396 PREV VISIT EST AGE 40-64: CPT | Mod: 25 | Performed by: PHYSICIAN ASSISTANT

## 2019-11-04 PROCEDURE — 36415 COLL VENOUS BLD VENIPUNCTURE: CPT | Performed by: PHYSICIAN ASSISTANT

## 2019-11-04 PROCEDURE — 83036 HEMOGLOBIN GLYCOSYLATED A1C: CPT | Performed by: PHYSICIAN ASSISTANT

## 2019-11-04 PROCEDURE — 82043 UR ALBUMIN QUANTITATIVE: CPT | Performed by: PHYSICIAN ASSISTANT

## 2019-11-04 RX ORDER — CHLORTHALIDONE 25 MG/1
TABLET ORAL
Qty: 90 TABLET | Refills: 3 | Status: SHIPPED | OUTPATIENT
Start: 2019-11-04 | End: 2020-11-09

## 2019-11-04 RX ORDER — METFORMIN HCL 500 MG
500 TABLET, EXTENDED RELEASE 24 HR ORAL
Qty: 90 TABLET | Refills: 3 | Status: SHIPPED | OUTPATIENT
Start: 2019-11-04 | End: 2020-05-07

## 2019-11-04 RX ORDER — LISINOPRIL 40 MG/1
TABLET ORAL
Qty: 90 TABLET | Refills: 3 | Status: SHIPPED | OUTPATIENT
Start: 2019-11-04 | End: 2020-11-09

## 2019-11-04 RX ORDER — SIMVASTATIN 40 MG
40 TABLET ORAL AT BEDTIME
Qty: 90 TABLET | Refills: 3 | Status: SHIPPED | OUTPATIENT
Start: 2019-11-04 | End: 2020-05-07 | Stop reason: ALTCHOICE

## 2019-11-04 ASSESSMENT — MIFFLIN-ST. JEOR: SCORE: 2122.25

## 2019-11-04 NOTE — PROGRESS NOTES
"  3  SUBJECTIVE:   CC: Thomas Pierce is an 49 year old male who presents for preventive health visit.     Healthy Habits:    Do you get at least three servings of calcium containing foods daily (dairy, green leafy vegetables, etc.)? no, taking calcium and/or vitamin D supplement: no    Amount of exercise or daily activities, outside of work: 0 day(s) per week    Problems taking medications regularly No    Medication side effects: No    Have you had an eye exam in the past two years? yes    Do you see a dentist twice per year? no    Do you have sleep apnea, excessive snoring or daytime drowsiness?no      Diabetes Follow-up; last a1c at goal, but pt admits \"food is my weakness\" so feels his DM may has regressed. Not checking BS at home.  Pretty good with his meds, but doesn't always remember. Admits he can go up to 1 week without remembering, but this is rare. More will miss around 2-3 days per week. If misses, will miss his BP and lipid meds too.   Used to drive druck and now hauling garbage.       How often are you checking your blood sugar? Not at all    What concerns do you have today about your diabetes? None     Do you have any of these symptoms? (Select all that apply)  No numbness or tingling in feet.  No redness, sores or blisters on feet.  No complaints of excessive thirst.  No reports of blurry vision.  No significant changes to weight.     Have you had a diabetic eye exam in the last 12 months? No    BP Readings from Last 2 Encounters:   11/04/19 118/78   04/23/19 120/78     Hemoglobin A1C (%)   Date Value   11/04/2019 6.4 (H)   01/29/2019 6.1 (H)     LDL Cholesterol Calculated (mg/dL)   Date Value   11/04/2019 149 (H)   01/29/2019 125 (H)       Diabetes Management Resources    Hyperlipidemia Follow-Up    Are you having any of the following symptoms? (Select all that apply)  No complaints of shortness of breath, chest pain or pressure.  No increased sweating or nausea with activity.  No left-sided " neck or arm pain.  No complaints of pain in calves when walking 1-2 blocks.    Are you regularly taking any medication or supplement to lower your cholesterol?   yes    Are you having muscle aches or other side effects that you think could be caused by your cholesterol lowering medication?  No    Hypertension Follow-up    Do you check your blood pressure regularly outside of the clinic? No     Are you following a low salt diet? No    Are your blood pressures ever more than 140 on the top number (systolic) OR more   than 90 on the bottom number (diastolic), for example 140/90? No      Today's PHQ-2 Score:   PHQ-2 ( 1999 [employer]) 1/29/2019 10/16/2018   Q1: Little interest or pleasure in doing things 0 0   Q2: Feeling down, depressed or hopeless 0 0   PHQ-2 Score 0 0       Abuse: Current or Past(Physical, Sexual or Emotional)- No  Do you feel safe in your environment? Yes        Social History     Tobacco Use     Smoking status: Never Smoker     Smokeless tobacco: Never Used   Substance Use Topics     Alcohol use: Yes     Comment: occasionally      If you drink alcohol do you typically have >3 drinks per day or >7 drinks per week? No                      Last PSA:   PSA   Date Value Ref Range Status   11/04/2019 0.68 0 - 4 ug/L Final     Comment:     Assay Method:  Chemiluminescence using Siemens Vista analyzer       Reviewed orders with patient. Reviewed health maintenance and updated orders accordingly - Yes  Patient Active Problem List   Diagnosis     Hypertension goal BP (blood pressure) < 140/90     Elevated LFTs     Severe obesity (BMI 35.0-39.9) with comorbidity (H)     Type 2 diabetes mellitus without complication, without long-term current use of insulin (H)     Hyperlipidemia LDL goal <100     Past Surgical History:   Procedure Laterality Date     MANDIBLE SURGERY         Social History     Tobacco Use     Smoking status: Never Smoker     Smokeless tobacco: Never Used   Substance Use Topics     Alcohol use:  Yes     Comment: occasionally      Family History   Problem Relation Age of Onset     Diabetes Mother         Type II     Hypertension Mother      Hyperlipidemia Mother      Cardiovascular Maternal Grandmother         PAD     Hepatitis Maternal Grandmother      Colon Polyps Cousin      Ovarian Cancer Maternal Aunt      Colon Cancer No family hx of      Prostate Cancer No family hx of      Coronary Artery Disease No family hx of      Cerebrovascular Disease No family hx of          Current Outpatient Medications   Medication Sig Dispense Refill     chlorthalidone (HYGROTON) 25 MG tablet TAKE 1 TABLET(25 MG) BY MOUTH DAILY 90 tablet 3     lisinopril (PRINIVIL/ZESTRIL) 40 MG tablet TAKE 1 TABLET(40 MG) BY MOUTH DAILY 90 tablet 3     metFORMIN (GLUCOPHAGE-XR) 500 MG 24 hr tablet Take 1 tablet (500 mg) by mouth daily (with dinner) 90 tablet 3     simvastatin (ZOCOR) 40 MG tablet Take 1 tablet (40 mg) by mouth At Bedtime 90 tablet 3     aspirin 81 MG tablet Take 1 tablet by mouth daily.       blood glucose (NO BRAND SPECIFIED) lancets standard Use to test blood sugar 2-4 times daily or as directed. 100 each 3     blood glucose calibration (NO BRAND SPECIFIED) solution Use to calibrate blood glucose monitor as directed. 1 each 1     blood glucose monitoring (NO BRAND SPECIFIED) meter device kit Use to test blood sugar 2-4 times daily or as directed. 1 kit 0     blood glucose monitoring (NO BRAND SPECIFIED) test strip Use to test blood sugars 2-4 times daily or as directed 100 strip 3     No Known Allergies    Reviewed and updated as needed this visit by clinical staff  Tobacco  Allergies  Meds  Med Hx  Surg Hx  Fam Hx  Soc Hx        Reviewed and updated as needed this visit by Provider  Tobacco  Allergies  Meds  Med Hx  Surg Hx  Fam Hx  Soc Hx           ROS:  CONSTITUTIONAL: NEGATIVE for fever, chills, change in weight  INTEGUMENTARY/SKIN: NEGATIVE for worrisome rashes, moles or lesions  EYES: NEGATIVE for  vision changes or irritation  ENT: NEGATIVE for ear, mouth and throat problems  RESP: NEGATIVE for significant cough or SOB  CV: NEGATIVE for chest pain, palpitations or peripheral edema  GI: NEGATIVE for nausea, abdominal pain, heartburn, or change in bowel habits   male: negative for dysuria, hematuria, decreased urinary stream, erectile dysfunction, urethral discharge  MUSCULOSKELETAL: NEGATIVE for significant arthralgias or myalgia  NEURO: NEGATIVE for weakness, dizziness or paresthesias  PSYCHIATRIC: NEGATIVE for changes in mood or affect      OBJECTIVE:   /78 (BP Location: Right arm, Cuff Size: Adult Large)   Pulse 89   Temp 98.4  F (36.9  C) (Oral)   Ht 1.829 m (6')   Wt 122.9 kg (271 lb)   SpO2 97%   BMI 36.75 kg/m    EXAM:  GENERAL: healthy, alert and no distress  EYES: Eyes grossly normal to inspection, PERRL and conjunctivae and sclerae normal  HENT: ear canals and TM's normal, nose and mouth without ulcers or lesions  NECK: no adenopathy, no asymmetry, masses, or scars and thyroid normal to palpation  RESP: lungs clear to auscultation - no rales, rhonchi or wheezes  CV: regular rate and rhythm, normal S1 S2, no S3 or S4, no murmur, click or rub, no peripheral edema and peripheral pulses strong  ABDOMEN: soft, nontender, no hepatosplenomegaly, no masses and bowel sounds normal  MS: no gross musculoskeletal defects noted, no edema  SKIN: no suspicious lesions or rashes  NEURO: Normal strength and tone, mentation intact and speech normal  PSYCH: mentation appears normal, affect normal/bright  Diabetic foot exam: normal DP and PT pulses, no trophic changes or ulcerative lesions, normal sensory exam and normal monofilament exam    Diagnostic Test Results:  Labs reviewed in Epic    ASSESSMENT/PLAN:       ICD-10-CM    1. Routine general medical examination at a health care facility Z00.00    2. Type 2 diabetes mellitus without complication, without long-term current use of insulin (H) E11.9 Lipid  panel reflex to direct LDL Fasting     Comprehensive metabolic panel     Hemoglobin A1c     Albumin Random Urine Quantitative with Creat Ratio     FOOT EXAM     metFORMIN (GLUCOPHAGE-XR) 500 MG 24 hr tablet   3. Special screening for malignant neoplasms, colon Z12.11 GASTROENTEROLOGY ADULT REF PROCEDURE ONLY   4. Need for influenza vaccination Z23 HC FLU VAC PRESRV FREE QUAD SPLIT VIR > 6 MONTHS IM [17011]   5. Hypertension goal BP (blood pressure) < 140/90 I10 lisinopril (PRINIVIL/ZESTRIL) 40 MG tablet     chlorthalidone (HYGROTON) 25 MG tablet   6. Hyperlipidemia LDL goal <160 E78.5 simvastatin (ZOCOR) 40 MG tablet   7. Screening for prostate cancer Z12.5 PSA, screen   8. Severe obesity (BMI 35.0-39.9) with comorbidity (H) E66.01    Chronic conditions are stable as noted above.  Re-assess labs and notify of results when available.  Pt admits to regression in dietary habits so may need DM recheck in 3 months instead of 6 pending labs.    COUNSELING:  Reviewed preventive health counseling, as reflected in patient instructions       Regular exercise       Healthy diet/nutrition       Immunizations    Vaccinated for: Influenza    Pt to check with insurance regarding shingrix.         Colon cancer screening       Prostate cancer screening    Estimated body mass index is 36.75 kg/m  as calculated from the following:    Height as of this encounter: 1.829 m (6').    Weight as of this encounter: 122.9 kg (271 lb).    Weight management plan: Discussed healthy diet and exercise guidelines     reports that he has never smoked. He has never used smokeless tobacco.      Counseling Resources:  ATP IV Guidelines  Pooled Cohorts Equation Calculator  FRAX Risk Assessment  ICSI Preventive Guidelines  Dietary Guidelines for Americans, 2010  USDA's MyPlate  ASA Prophylaxis  Lung CA Screening    Leyla Underwood PA-C  [facility]

## 2019-11-05 LAB
ALBUMIN SERPL-MCNC: 4.1 G/DL (ref 3.4–5)
ALP SERPL-CCNC: 61 U/L (ref 40–150)
ALT SERPL W P-5'-P-CCNC: 50 U/L (ref 0–70)
ANION GAP SERPL CALCULATED.3IONS-SCNC: 11 MMOL/L (ref 3–14)
AST SERPL W P-5'-P-CCNC: 18 U/L (ref 0–45)
BILIRUB SERPL-MCNC: 0.5 MG/DL (ref 0.2–1.3)
BUN SERPL-MCNC: 13 MG/DL (ref 7–30)
CALCIUM SERPL-MCNC: 9.2 MG/DL (ref 8.5–10.1)
CHLORIDE SERPL-SCNC: 104 MMOL/L (ref 94–109)
CHOLEST SERPL-MCNC: 235 MG/DL
CO2 SERPL-SCNC: 22 MMOL/L (ref 20–32)
CREAT SERPL-MCNC: 0.8 MG/DL (ref 0.66–1.25)
CREAT UR-MCNC: 133 MG/DL
GFR SERPL CREATININE-BSD FRML MDRD: >90 ML/MIN/{1.73_M2}
GLUCOSE SERPL-MCNC: 109 MG/DL (ref 70–99)
HDLC SERPL-MCNC: 41 MG/DL
LDLC SERPL CALC-MCNC: 149 MG/DL
MICROALBUMIN UR-MCNC: 9 MG/L
MICROALBUMIN/CREAT UR: 6.47 MG/G CR (ref 0–17)
NONHDLC SERPL-MCNC: 194 MG/DL
POTASSIUM SERPL-SCNC: 3.7 MMOL/L (ref 3.4–5.3)
PROT SERPL-MCNC: 7.3 G/DL (ref 6.8–8.8)
PSA SERPL-ACNC: 0.68 UG/L (ref 0–4)
SODIUM SERPL-SCNC: 137 MMOL/L (ref 133–144)
TRIGL SERPL-MCNC: 223 MG/DL

## 2019-11-10 NOTE — RESULT ENCOUNTER NOTE
Dear Thomas,      Your recent test results are noted below:    -PSA (prostate specific antigen) test is normal.  This indicates a low likelihood of prostate cancer.  ADVISE: rechecking this in 1 year.  -LDL(bad) cholesterol level is elevated, and your triglycerides are elevated which can increase your heart disease risk.  A diet high in fat and simple carbohydrates, genetics and being overweight can contribute to this. ADVISE: exercising 150 minutes of aerobic exercise per week (30 minutes for 5 days per week or 50 minutes for 3 days per week are options), eating a low saturated fat/low carbohydrate diet, and omega-3 fatty acids (fish oil) 8963-4659 mg daily are helpful to improve this. I know you hadn't been taking your cholesterol medication regularly so please make sure you're taking this consistently. In 3 months, you should recheck your fasting cholesterol panel by scheduling a lab-only appointment.  -Liver and gallbladder tests (ALT,AST, Alk phos,bilirubin) are normal.  -Kidney function (GFR) is normal.  -Sodium is normal.  -Potassium is normal.  -Calcium is normal.  -Glucose is elevated due to your diabetes.  -A1C (test of diabetes control the last 2-3 months) is at your goal. Please continue with your current plan. Also, you should make an appointment to see me and recheck your A1C test in 6 months.   -Microalbumin (urine protein) test is normal.  ADVISE: rechecking this annually.    For additional lab test information, labtestsonline.org is an excellent reference. Please contact the clinic at [phone] with any further questions or concerns.    Sincerely,      Leyla Underwood PA-C  [facility]

## 2020-02-24 ENCOUNTER — TRANSFERRED RECORDS (OUTPATIENT)
Dept: HEALTH INFORMATION MANAGEMENT | Facility: CLINIC | Age: 50
End: 2020-02-24

## 2020-02-26 ENCOUNTER — MEDICAL CORRESPONDENCE (OUTPATIENT)
Dept: HEALTH INFORMATION MANAGEMENT | Facility: CLINIC | Age: 50
End: 2020-02-26

## 2020-02-28 ENCOUNTER — OFFICE VISIT (OUTPATIENT)
Dept: FAMILY MEDICINE | Facility: CLINIC | Age: 50
End: 2020-02-28
Payer: OTHER MISCELLANEOUS

## 2020-02-28 VITALS
HEIGHT: 72 IN | TEMPERATURE: 98.1 F | DIASTOLIC BLOOD PRESSURE: 78 MMHG | OXYGEN SATURATION: 96 % | SYSTOLIC BLOOD PRESSURE: 106 MMHG | BODY MASS INDEX: 37.79 KG/M2 | HEART RATE: 88 BPM | WEIGHT: 279 LBS

## 2020-02-28 DIAGNOSIS — S49.92XD INJURY OF LEFT SHOULDER, SUBSEQUENT ENCOUNTER: Primary | ICD-10-CM

## 2020-02-28 DIAGNOSIS — Z02.6 ENCOUNTER RELATED TO WORKER'S COMPENSATION CLAIM: ICD-10-CM

## 2020-02-28 PROCEDURE — 99214 OFFICE O/P EST MOD 30 MIN: CPT | Performed by: NURSE PRACTITIONER

## 2020-02-28 ASSESSMENT — MIFFLIN-ST. JEOR: SCORE: 2153.54

## 2020-02-28 NOTE — PROGRESS NOTES
Subjective     Thomas Pierce is a 50 year old male who presents to clinic today for the following health issues:    HPI     Patient reports initial injury to left shoulder when he was at work.  He reports that he slipped on the ice.  No loss of consciousness or head injury.      Was seen on 2/24/20 for urgent care visit.  Negative x-ray at that time.      Joint Pain    Onset: x 5 days - is at work doing light duty     Description:   Location: left shoulder  Character: Sharp and dull ache with movement- sometimes can move it  He reports his symptoms as good and bad.  Intermittently range of motion will worsen.    Aggravated with movement.      Intensity: mild    Progression of Symptoms: intermittent    Accompanying Signs & Symptoms:  Other symptoms: none    History:   Previous similar pain: no       Precipitating factors:   Trauma or overuse: no     Alleviating factors:  Improved by: nothing    Therapies Tried and outcome: Aleve, 2 tablets 1-2 times daily - nothing today.        No Known Allergies  PMH, Social and Family history reviewed.      Reviewed and updated as needed this visit by Provider  Tobacco  Med Hx  Surg Hx  Fam Hx  Soc Hx        Review of Systems   ROS COMP: Constitutional, HEENT, cardiovascular, pulmonary, gi and gu systems are negative, except as otherwise noted.      Objective    /78 (BP Location: Right arm, Patient Position: Sitting, Cuff Size: Adult Regular)   Pulse 88   Temp 98.1  F (36.7  C) (Oral)   Ht 1.829 m (6')   Wt 126.6 kg (279 lb)   SpO2 96%   BMI 37.84 kg/m    Body mass index is 37.84 kg/m .  Physical Exam   GENERAL: healthy, alert and no distress  RESP: lungs clear to auscultation - no rales, rhonchi or wheezes  CV: regular rate and rhythm, normal S1 S2, no S3 or S4, no murmur  MS: left shoulder with no swelling, erythema, non-tender to palpation  Left shoulder extension limited due to pain.   Hand  strength in bilateral UE's are symmetrical and +5/5.     Left arm Internal rotation strength is +3/5 compared to right arm.    PSYCH: mentation appears normal, affect normal/bright        Assessment & Plan     Thomas was seen today for shoulder.    Diagnoses and all orders for this visit:    Injury of left shoulder, subsequent encounter  Worker's compensation claim  Will proceed with physical therapy for left shoulder pain and plan MRI to further evaluate for ligament/tendon injuries.   -     [facility] PT[facility] HAND, AND CHIROPRACTIC REFERRAL; Future  -     MR Shoulder Left w/o Contrast; Future      Return in about 2 weeks (around 3/13/2020) for pending MRI results - possible orthopedics consultation.        CARLYN Holland [facility]

## 2020-02-28 NOTE — LETTER
February 28, 2020      Thomas Pierce  [address] [address]        To Whom It May Concern:    Thomas Pierce was seen in our clinic. He may return to work with the following: limited to light duty - no lifting with left arm and no repetitive motions.  Light duty pending MRI results and physical therapy progress.        Sincerely,        CARLYN Holland CNP

## 2020-03-03 ENCOUNTER — THERAPY VISIT (OUTPATIENT)
Dept: PHYSICAL THERAPY | Facility: CLINIC | Age: 50
End: 2020-03-03
Attending: NURSE PRACTITIONER
Payer: OTHER MISCELLANEOUS

## 2020-03-03 DIAGNOSIS — S49.92XD INJURY OF LEFT SHOULDER, SUBSEQUENT ENCOUNTER: ICD-10-CM

## 2020-03-03 DIAGNOSIS — M25.512 ACUTE PAIN OF LEFT SHOULDER: Primary | ICD-10-CM

## 2020-03-03 PROCEDURE — 97110 THERAPEUTIC EXERCISES: CPT | Mod: GP | Performed by: PHYSICAL THERAPIST

## 2020-03-03 PROCEDURE — 97161 PT EVAL LOW COMPLEX 20 MIN: CPT | Mod: GP | Performed by: PHYSICAL THERAPIST

## 2020-03-03 NOTE — PROGRESS NOTES
[facility] Initial Evaluation  Subjective:  The history is provided by the patient. No  was used.   Therapist Generated HPI Evaluation  Problem details: Pt slipped [facility] walking [facility] [place] while working, felt instant pain in shoulder.  Pt reports limited with ROM and pain since fall.  Pt is waiting on MRI approval with  insurance..         Type of problem:  Left shoulder.    This is a new condition.  Condition occurred with:  A fall.  Where condition occurred: at work.  Patient reports pain:  Anterior and posterior.  Pain is described as aching and sharp and is intermittent.  Pain radiates to:  Shoulder. Pain is the same all the time.  Since onset symptoms are unchanged.  Associated symptoms:  Loss of motion/stiffness and loss of strength. Symptoms are exacerbated by certain positions, lifting, using arm at shoulder level, using arm behind back and using arm overhead  and relieved by nothing.  Special tests included:  X-ray (waiting for MRI approval from [employer] insurance).    Restrictions due to condition include:  Working in normal job with restrictions (no pushing,pulling w L UE).  Barriers include:  None as reported by patient.    Patient Health History  Ebotto Pierce being seen for shoulder.     Problem began: 2/24/2020.   Problem occurred: Fall   Pain is reported as 2/10 on pain scale.  General health as reported by patient is good.  Pertinent medical history includes: high blood pressure and diabetes.   Red flags:  None as reported by patient.     Surgeries include:  None.    Current medications: see EPIC.    Current occupation is  - pt is now sitting in office since fall.   Primary job tasks include:  Computer work, lifting/carrying, pushing/pulling and driving.                                    Objective:  Standing Alignment:      Shoulder/UE:  Rounded shoulders                                       Shoulder Evaluation:  ROM:  AROM:     Flexion:  Left:  35    Right:  170    Abduction:  Left: 35   Right:  170      External Rotation:  Left:  25    Right:  75            Extension/Internal Rotation:  Left:  L2    Right:  T11    PROM:  normal                                Strength:    Flexion: Right: 5/5     Pain:     Abduction:  Right: 5/5     Pain:    Internal Rotation:  Left:5-/5      Pain:-    Right: 5/5     Pain:  External Rotation:   Left:3-/5      Pain:++   Right:5/5     Pain:              Special Tests:    Left shoulder positive for the following special tests:  Rotator cuff tear (++ Pain with RC Lag sign in 90/90, arm did not drop = probable signs of partial RCT)                                         General     ROS    Assessment/Plan:    Patient is a 50 year old male with left side shoulder complaints.    Patient has the following significant findings with corresponding treatment plan.                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  Pain -  hot/cold therapy, self management, education and home program  Decreased ROM/flexibility - manual therapy and therapeutic exercise  Decreased strength - therapeutic exercise and therapeutic activities  Inflammation - cold therapy  Decreased function - therapeutic activities  Impaired posture - neuro re-education    Previous and current functional limitations:  (See Goal Flow Sheet for this information)    Short term and Long term goals: (See Goal Flow Sheet for this information)     Communication ability:  Patient appears to be able to clearly communicate and understand verbal and written communication and follow directions correctly.  Treatment Explanation - The following has been discussed with the patient:   RX ordered/plan of care  Anticipated outcomes  Possible risks and side effects  This patient would benefit from PT intervention to resume normal activities.   Rehab potential is fair.    Frequency:  1 X week, once daily  Duration:  for 4-6 weeks (if no  change with ROM/strength/function over 2-3 weeks strongly recommend shoulder orthopedic assessment and also hope  approves MRI assessment.)    Discharge Plan:  Achieve all LTG.  Independent in home treatment program.  Reach maximal therapeutic benefit.    Please refer to the daily flowsheet for treatment today, total treatment time and time spent performing 1:1 timed codes.

## 2020-03-04 PROBLEM — S49.92XD INJURY OF LEFT SHOULDER, SUBSEQUENT ENCOUNTER: Status: ACTIVE | Noted: 2020-03-04

## 2020-03-04 PROBLEM — M25.512 ACUTE PAIN OF LEFT SHOULDER: Status: ACTIVE | Noted: 2020-03-04

## 2020-03-06 ENCOUNTER — TELEPHONE (OUTPATIENT)
Dept: FAMILY MEDICINE | Facility: CLINIC | Age: 50
End: 2020-03-06

## 2020-03-06 NOTE — TELEPHONE ENCOUNTER
Reason for Call:  Other     Detailed comments: Smita from [facility] called to request order and medical support for MRI for this pt work injury related Clam number is [id number]. Fax # [phone]    Phone Number Patient can be reached at: Other phone number:  [phone]    Best Time: anytime    Can we leave a detailed message on this number? YES    Call taken on 3/6/2020 at 3:49 PM by Bobbi White

## 2020-03-09 ENCOUNTER — HOSPITAL ENCOUNTER (OUTPATIENT)
Dept: MRI IMAGING | Facility: CLINIC | Age: 50
Discharge: HOME | End: 2020-03-09
Attending: NURSE PRACTITIONER | Admitting: NURSE PRACTITIONER
Payer: OTHER MISCELLANEOUS

## 2020-03-09 DIAGNOSIS — S49.92XD INJURY OF LEFT SHOULDER, SUBSEQUENT ENCOUNTER: ICD-10-CM

## 2020-03-09 PROCEDURE — 73221 MRI JOINT UPR EXTREM W/O DYE: CPT | Mod: LT

## 2020-03-09 NOTE — TELEPHONE ENCOUNTER
Rcvd fax back stating MRI is approved and Smita would like date of MRI when scheduled.  Called pt at [phone] and left non detailed message for pt to call me back.  I need to let him know this is approved and want to help make sure appt gets scheduled so I can fax Smita back.  Please transfer call to me when he calls back.  Skye Kaur

## 2020-03-09 NOTE — TELEPHONE ENCOUNTER
Pt called back.  We decided I camilo call to make appt so I can fax back to Smita with date.  Called imaging and made appt for today at 530PM at [facility].    Fax sent to Smita with date.  Called pt and let him know this was done.    Skye Kaur

## 2020-03-10 ENCOUNTER — THERAPY VISIT (OUTPATIENT)
Dept: PHYSICAL THERAPY | Facility: CLINIC | Age: 50
End: 2020-03-10
Payer: OTHER MISCELLANEOUS

## 2020-03-10 DIAGNOSIS — M75.102 TEAR OF LEFT SUPRASPINATUS TENDON: ICD-10-CM

## 2020-03-10 DIAGNOSIS — S46.812D PARTIAL TEAR OF LEFT SUBSCAPULARIS TENDON, SUBSEQUENT ENCOUNTER: Primary | ICD-10-CM

## 2020-03-10 DIAGNOSIS — S49.92XD INJURY OF LEFT SHOULDER, SUBSEQUENT ENCOUNTER: ICD-10-CM

## 2020-03-10 DIAGNOSIS — M25.512 ACUTE PAIN OF LEFT SHOULDER: ICD-10-CM

## 2020-03-10 PROCEDURE — 97110 THERAPEUTIC EXERCISES: CPT | Mod: GP | Performed by: PHYSICAL THERAPIST

## 2020-03-10 NOTE — PROGRESS NOTES
Subjective:  HPI  Physical Exam                    Objective:  System    Physical Exam    General     ROS    Assessment/Plan:    SUBJECTIVE  Subjective changes as noted by pt:   MRI yesterday, MD appt tomorrow with Dr Yeo.  Nick reports no change in his functional AROM, strength.  He has maintained his AAROM/PROM to WNL which will help him in the event he needs RCR surgery in the near future.    Changes in function:  None     Adverse reaction to treatment or activity:  None    OBJECTIVE  Changes in objective findings:  None  Objective: AROM no change, cont good AAROM/PROM     ASSESSMENT  Thomas continues to require intervention to meet STG and LTG's: PT  No change of symptoms has been noted.  Response to therapy has shown lack of progress in  ROM   Progress made towards STG/LTG?  None    PLAN  Continue current treatment plan until patient demonstrates readiness to progress to higher level exercises.  Pt has a full thickness RCT large to massive and will most likely need to progress with surgical repair of this.  He has kept up with his AAROM ex's to help prevent his shoulder from becoming stiff.  Pt will see Dr Yeo tomorrow for further recommendations.    PTA/ATC plan:  N/A    Please refer to the daily flowsheet for treatment today, total treatment time and time spent performing 1:1 timed codes.

## 2020-03-11 ENCOUNTER — OFFICE VISIT (OUTPATIENT)
Dept: ORTHOPEDICS | Facility: CLINIC | Age: 50
End: 2020-03-11
Attending: NURSE PRACTITIONER
Payer: OTHER MISCELLANEOUS

## 2020-03-11 VITALS
SYSTOLIC BLOOD PRESSURE: 118 MMHG | DIASTOLIC BLOOD PRESSURE: 68 MMHG | WEIGHT: 279 LBS | BODY MASS INDEX: 37.79 KG/M2 | HEIGHT: 72 IN

## 2020-03-11 DIAGNOSIS — S46.812A TRAUMATIC TEAR OF SUPRASPINATUS TENDON OF LEFT SHOULDER, INITIAL ENCOUNTER: Primary | ICD-10-CM

## 2020-03-11 PROCEDURE — 99243 OFF/OP CNSLTJ NEW/EST LOW 30: CPT | Performed by: FAMILY MEDICINE

## 2020-03-11 ASSESSMENT — MIFFLIN-ST. JEOR: SCORE: 2153.54

## 2020-03-11 NOTE — PROGRESS NOTES
ASSESSMENT & PLAN  Patient Instructions     1. Traumatic tear of supraspinatus tendon of left shoulder, initial encounter      -Patient has left shoulder pain and weakness mainly due to a full-thickness tear of the supraspinatus tendon  -Patient will be referred to Dr. Boyd for surgical consultation  -Patient will continue with light duty until he is medically cleared by his surgeon  -Call direct clinic number [[phone]] at any time with questions or concerns.    Albert Yeo MD [facility]            -----    SUBJECTIVE  Thomas Piecre is a/an 50 year old Right handed male who is seen in consultation at the request of  Glenys Alonso C.N.P. for evaluation of left shoulder pain. The patient is seen by themselves.    Onset: 1 1/2 week(s) ago. Patient describes injury as slipped on ice and fell on the ground and his shoulder hurt.   Location of Pain: left anterior and lateral shoulder pain. But doesn't have pain at the moment.   Rating of Pain at worst: 5/10  Rating of Pain Currently: 0/10  Worsened by: putting clothes on, has limited ROM. Can't really get arm above 30 is painful. Sleeps in recliner  Better with: activity avoidance.   Treatments tried: rest/activity avoidance, Aleve, home exercises, physical therapy (2 visits) and previous imaging (MRI 3/9/2020)  Associated symptoms: weakness of arm strength and non painful popping noises  Orthopedic history: NO  Relevant surgical history: NO  Social history: social history: works at [employer].     Past Medical History:   Diagnosis Date     Elevated fasting blood sugar 10/30/2017     Hyperlipidemia LDL goal <160 10/20/2011     Hypertension goal BP (blood pressure) < 140/90 10/20/2011     Prediabetes      Social History     Socioeconomic History     Marital status:      Spouse name: Lucy     Number of children: 0     Years of education: Not on file     Highest education level:  Not on file   Occupational History     Occupation:    Social Needs     Financial resource strain: Not on file     Food insecurity     Worry: Not on file     Inability: Not on file     Transportation needs     Medical: Not on file     Non-medical: Not on file   Tobacco Use     Smoking status: Never Smoker     Smokeless tobacco: Never Used   Substance and Sexual Activity     Alcohol use: Yes     Comment: occasionally      Drug use: No     Sexual activity: Never   Lifestyle     Physical activity     Days per week: Not on file     Minutes per session: Not on file     Stress: Not on file   Relationships     Social connections     Talks on phone: Not on file     Gets together: Not on file     Attends [employer] service: Not on file     Active member of club or organization: Not on file     Attends meetings of clubs or organizations: Not on file     Relationship status: Not on file     Intimate partner violence     Fear of current or ex partner: Not on file     Emotionally abused: Not on file     Physically abused: Not on file     Forced sexual activity: Not on file   Other Topics Concern     Parent/sibling w/ CABG, MI or angioplasty before 65F 55M? No   Social History Narrative     Not on file         Patient's past medical, surgical, social, and family histories were reviewed today and no changes are noted.    REVIEW OF SYSTEMS:  10 point ROS is negative other than symptoms noted above in HPI, Past Medical History or as stated below  Constitutional: NEGATIVE for fever, chills, change in weight  Skin: NEGATIVE for worrisome rashes, moles or lesions  GI/: NEGATIVE for bowel or bladder changes  Neuro: NEGATIVE for weakness, dizziness or paresthesias    OBJECTIVE:  /68   Ht 1.829 m (6')   Wt 126.6 kg (279 lb)   BMI 37.84 kg/m     General: healthy, alert and in no distress  HEENT: no scleral icterus or conjunctival erythema  Skin: no suspicious lesions or rash. No jaundice.  CV: regular rhythm  by palpation  Resp: normal respiratory effort without conversational dyspnea   Psych: normal mood and affect  Gait: normal steady gait with appropriate coordination and balance  Neuro: normal light touch sensory exam of the bilateral upper extremities.    MSK:  LEFT SHOULDER  Inspection:    no swelling, bruising, discoloration, or obvious deformity or asymmetry  Palpation:  bony and tendinous landmarks are nontender.  Active Range of Motion:     Abduction 450, , , IR SI joint.      Scapular dyskinesis absent  Strength:    Scapular plane abduction 4-/5,  ER 4/5, IR 5/5, biceps 5/5, triceps 5/5  Special Tests:    Positive: supraspinatus (empty can) and drop arm/painful arc      Independent visualization of the below image:  No results found for this or any previous visit (from the past 24 hour(s)).   MR LEFT SHOULDER WITHOUT CONTRAST  3/9/2020 5:50 PM     HISTORY:  Shoulder pain, acute, persistent; x-ray and exam  nonspecific. Injury of left shoulder, subsequent encounter.     TECHNIQUE:  Coronal oblique T1, T2, and fat suppressed T2, sagittal  oblique T2, and transverse proton density and T2 weighted images.     FINDINGS:   Osseous acromial outlet: There is a Type I subacromial configuration.  Although there is no apparent subacromial spur, coracoacromial  ligament thickening is noted at the acromial attachment. Relatively  advanced acromioclavicular joint degenerative changes are noted with  moderate indentation upon the supraspinatus outlet associated with the  distal head of the clavicle.     Rotator cuff:  There is a complete tear of the supraspinatus tendon  which is mildly frayed and retracted medially to the glenohumeral  joint line. The tear extends into the infraspinatus tendon which  appears to be markedly thickened with prominent intrasubstance  intermediate signal intensity. Also noted is a high-grade tear of the  subscapularis tendon. There may be a few intact superficial fibers.  With the  shoulder relatively internally rotated, only minimal  subscapularis tendon retraction is noted. Mild to moderate  supraspinatus/infraspinatus muscle atrophy is noted. There is also  prominent teres minor muscle fatty atrophy. No lesion is identified  within the region of the quadrilateral space.     Labral Structures: Diffuse degeneration of the anterior labrum is  noted. No paralabral cyst.     Biceps Tendon: No long head biceps tendon tear, subluxation, or  tendinosis.     Osseous structures:  Marrow signal about the shoulder is within normal  limits. No apparent chondromalacia.     Joint space: Small glenohumeral joint effusion with fluid tracking  into the subacromial/subdeltoid bursa.     Additional findings:  No deltoid muscle edema.                                                                       IMPRESSION:   1. Supraspinatus tendon complete tear with extension into the  infraspinatus tendon. Mild to moderate supraspinatus/infraspinatus  muscle atrophy is noted.  2. Subscapularis tendon high-grade tear. There appear to be a few  intact bursal surface fibers.  3. Teres minor muscle fatty atrophy. No apparent lesion within the  region of the quadrilateral space.  4. Coracoacromial ligament thickening at the acromial attachment and  acromioclavicular joint degenerative arthrosis, findings which could  be associated with supraspinatus impingement.  5. Anterior labral degeneration.     JOSEPH SPAETH, MD Albert Yeo MD [facility] Orth[facility]

## 2020-03-11 NOTE — LETTER
3/11/2020         RE: Thomas Pierce  [address] [address]        Dear Colleague,    Thank you for referring your patient, Thomas Pierce, to the [facility]. Please see a copy of my visit note below.    ASSESSMENT & PLAN  Patient Instructions     1. Traumatic tear of supraspinatus tendon of left shoulder, initial encounter      -Patient has left shoulder pain and weakness mainly due to a full-thickness tear of the supraspinatus tendon  -Patient will be referred to Dr. Boyd for surgical consultation  -Patient will continue with light duty until he is medically cleared by his surgeon  -Call direct clinic number [[phone]] at any time with questions or concerns.    Albert Yeo MD [facility] Orthopedics and Sports Medicine  [facility]          -----    SUBJECTIVE  Thomas Pierce is a/an 50 year old Right handed male who is seen in consultation at the request of  Glenys Alonso C.N.P. for evaluation of left shoulder pain. The patient is seen by themselves.    Onset: 1 1/2 week(s) ago. Patient describes injury as slipped on ice and fell on the ground and his shoulder hurt.   Location of Pain: left anterior and lateral shoulder pain. But doesn't have pain at the moment.   Rating of Pain at worst: 5/10  Rating of Pain Currently: 0/10  Worsened by: putting clothes on, has limited ROM. Can't really get arm above 30 is painful. Sleeps in recliner  Better with: activity avoidance.   Treatments tried: rest/activity avoidance, Aleve, home exercises, physical therapy (2 visits) and previous imaging (MRI 3/9/2020)  Associated symptoms: weakness of arm strength and non painful popping noises  Orthopedic history: NO  Relevant surgical history: NO  Social history: social history: works at [employer].     Past Medical History:   Diagnosis Date     Elevated fasting blood sugar 10/30/2017     Hyperlipidemia LDL goal <160 10/20/2011      Hypertension goal BP (blood pressure) < 140/90 10/20/2011     Prediabetes      Social History     Socioeconomic History     Marital status:      Spouse name: Lucy     Number of children: 0     Years of education: Not on file     Highest education level: Not on file   Occupational History     Occupation:    Social Needs     Financial resource strain: Not on file     Food insecurity     Worry: Not on file     Inability: Not on file     Transportation needs     Medical: Not on file     Non-medical: Not on file   Tobacco Use     Smoking status: Never Smoker     Smokeless tobacco: Never Used   Substance and Sexual Activity     Alcohol use: Yes     Comment: occasionally      Drug use: No     Sexual activity: Never   Lifestyle     Physical activity     Days per week: Not on file     Minutes per session: Not on file     Stress: Not on file   Relationships     Social connections     Talks on phone: Not on file     Gets together: Not on file     Attends Nondenominational service: Not on file     Active member of club or organization: Not on file     Attends meetings of clubs or organizations: Not on file     Relationship status: Not on file     Intimate partner violence     Fear of current or ex partner: Not on file     Emotionally abused: Not on file     Physically abused: Not on file     Forced sexual activity: Not on file   Other Topics Concern     Parent/sibling w/ CABG, MI or angioplasty before 65F 55M? No   Social History Narrative     Not on file         Patient's past medical, surgical, social, and family histories were reviewed today and no changes are noted.    REVIEW OF SYSTEMS:  10 point ROS is negative other than symptoms noted above in HPI, Past Medical History or as stated below  Constitutional: NEGATIVE for fever, chills, change in weight  Skin: NEGATIVE for worrisome rashes, moles or lesions  GI/: NEGATIVE for bowel or bladder changes  Neuro: NEGATIVE for weakness, dizziness or  paresthesias    OBJECTIVE:  /68   Ht 1.829 m (6')   Wt 126.6 kg (279 lb)   BMI 37.84 kg/m     General: healthy, alert and in no distress  HEENT: no scleral icterus or conjunctival erythema  Skin: no suspicious lesions or rash. No jaundice.  CV: regular rhythm by palpation  Resp: normal respiratory effort without conversational dyspnea   Psych: normal mood and affect  Gait: normal steady gait with appropriate coordination and balance  Neuro: normal light touch sensory exam of the bilateral upper extremities.    MSK:  LEFT SHOULDER  Inspection:    no swelling, bruising, discoloration, or obvious deformity or asymmetry  Palpation:  bony and tendinous landmarks are nontender.  Active Range of Motion:     Abduction 450, , , IR SI joint.      Scapular dyskinesis absent  Strength:    Scapular plane abduction 4-/5,  ER 4/5, IR 5/5, biceps 5/5, triceps 5/5  Special Tests:    Positive: supraspinatus (empty can) and drop arm/painful arc      Independent visualization of the below image:  No results found for this or any previous visit (from the past 24 hour(s)).   MR LEFT SHOULDER WITHOUT CONTRAST  3/9/2020 5:50 PM     HISTORY:  Shoulder pain, acute, persistent; x-ray and exam  nonspecific. Injury of left shoulder, subsequent encounter.     TECHNIQUE:  Coronal oblique T1, T2, and fat suppressed T2, sagittal  oblique T2, and transverse proton density and T2 weighted images.     FINDINGS:   Osseous acromial outlet: There is a Type I subacromial configuration.  Although there is no apparent subacromial spur, coracoacromial  ligament thickening is noted at the acromial attachment. Relatively  advanced acromioclavicular joint degenerative changes are noted with  moderate indentation upon the supraspinatus outlet associated with the  distal head of the clavicle.     Rotator cuff:  There is a complete tear of the supraspinatus tendon  which is mildly frayed and retracted medially to the glenohumeral  joint line.  The tear extends into the infraspinatus tendon which  appears to be markedly thickened with prominent intrasubstance  intermediate signal intensity. Also noted is a high-grade tear of the  subscapularis tendon. There may be a few intact superficial fibers.  With the shoulder relatively internally rotated, only minimal  subscapularis tendon retraction is noted. Mild to moderate  supraspinatus/infraspinatus muscle atrophy is noted. There is also  prominent teres minor muscle fatty atrophy. No lesion is identified  within the region of the quadrilateral space.     Labral Structures: Diffuse degeneration of the anterior labrum is  noted. No paralabral cyst.     Biceps Tendon: No long head biceps tendon tear, subluxation, or  tendinosis.     Osseous structures:  Marrow signal about the shoulder is within normal  limits. No apparent chondromalacia.     Joint space: Small glenohumeral joint effusion with fluid tracking  into the subacromial/subdeltoid bursa.     Additional findings:  No deltoid muscle edema.                                                                       IMPRESSION:   1. Supraspinatus tendon complete tear with extension into the  infraspinatus tendon. Mild to moderate supraspinatus/infraspinatus  muscle atrophy is noted.  2. Subscapularis tendon high-grade tear. There appear to be a few  intact bursal surface fibers.  3. Teres minor muscle fatty atrophy. No apparent lesion within the  region of the quadrilateral space.  4. Coracoacromial ligament thickening at the acromial attachment and  acromioclavicular joint degenerative arthrosis, findings which could  be associated with supraspinatus impingement.  5. Anterior labral degeneration.     JOSEPH SPAETH, MD Albert Yeo MD [facility] Orthopedic Care      Again, thank you for allowing me to participate in the care of your patient.        Sincerely,        Albert Yeo, MD

## 2020-03-11 NOTE — PATIENT INSTRUCTIONS
1. Traumatic tear of supraspinatus tendon of left shoulder, initial encounter      -Patient has left shoulder pain and weakness mainly due to a full-thickness tear of the supraspinatus tendon  -Patient will be referred to Dr. Boyd for surgical consultation  -Patient will continue with light duty until he is medically cleared by his surgeon  -Call direct clinic number [[phone]] at any time with questions or concerns.    Albert Yeo MD [facility]  [facility]

## 2020-03-11 NOTE — LETTER
March 11, 2020      Thomas Pierce  [address] [address]        To Whom It May Concern:    Thomas Pierce was seen in our clinic. He may return to work with the following: limited to light duty - lifting no greater than 15 pounds until he is medically cleared by his surgeon.      Sincerely,        Albert Yeo, MD

## 2020-03-18 ENCOUNTER — TELEPHONE (OUTPATIENT)
Dept: ORTHOPEDICS | Facility: CLINIC | Age: 50
End: 2020-03-18

## 2020-03-18 NOTE — TELEPHONE ENCOUNTER
LVM for patient requesting call back to Nurse Triage line.     Dr. Boyd requests that patient reschedule upcoming appointment on Tuesday, 03/24/2020, amid concerns for Coronavirus/COVID-19 exposure. Earliest availability based on [facility] recommendation is 04/20/2020.        Crow Rosario ATC

## 2020-04-06 ENCOUNTER — TELEPHONE (OUTPATIENT)
Dept: PHYSICAL THERAPY | Facility: CLINIC | Age: 50
End: 2020-04-06

## 2020-04-06 DIAGNOSIS — S49.92XD INJURY OF LEFT SHOULDER, SUBSEQUENT ENCOUNTER: ICD-10-CM

## 2020-04-06 DIAGNOSIS — M25.512 ACUTE PAIN OF LEFT SHOULDER: ICD-10-CM

## 2020-04-14 ENCOUNTER — TELEPHONE (OUTPATIENT)
Dept: PHYSICAL THERAPY | Facility: CLINIC | Age: 50
End: 2020-04-14

## 2020-04-14 DIAGNOSIS — S49.92XD INJURY OF LEFT SHOULDER, SUBSEQUENT ENCOUNTER: ICD-10-CM

## 2020-04-14 DIAGNOSIS — M25.512 ACUTE PAIN OF LEFT SHOULDER: ICD-10-CM

## 2020-04-15 ENCOUNTER — TELEPHONE (OUTPATIENT)
Dept: ORTHOPEDICS | Facility: CLINIC | Age: 50
End: 2020-04-15

## 2020-04-17 NOTE — PROGRESS NOTES
"Thomas Pierce is a 50 year old male who is being evaluated via a billable video visit.      The patient has been notified of following:     \"This telephone visit will be conducted via a call between you and your physician/provider. We have found that certain health care needs can be provided without the need for an in-person physical exam.  This service lets us provide the care you need with a telephone conversation.  If a prescription is necessary we can send it directly to your pharmacy.  If lab work is needed we can place an order for that and you can then stop by our lab to have the test done at a later time.    Telephone visits are billed at different rates depending on your insurance coverage.  Please reach out to your insurance provider with any questions.    If during the course of the call the physician/provider feels a video visit is not appropriate, you will not be charged for this service.\"    Patient has given verbal consent for Telephone visit? Yes    How would you like to obtain your AVS? [employer]     Telephone Start Time: 0812    CHIEF COMPLAINT: Left shoulder pain    DIAGNOSIS: Work comp injury: Left shoulder full thickness rotator cuff tear of the  supraspinatus, infraspinatus, and subscapularis.  Minimal to moderate atrophy.  Subacromial bursitis.  AC joint arthrosis.    OCCUPATION/SPORT: , some manual work     HPI:   Thomas Pierce is a 50 year old, right-hand dominant male who presents for evaluation of left  shoulder pain. Symptoms started on February 24th of 2020. There was a precipitating even where the patient slipped and fell landing on his left shoulder. This happened at work. This is a work comp injury. The pain is located to the left  shoulder. Worst pain is rated a 5 of 10, and current pain is rated at 0 of 10. Symptoms are worsened by use, reaching, abduction, overhead, lifting. He notes difficulty reaching or lifting the arm under it's own power. " Symptoms are improved with rest. Patient has tried physical therapy with minimal relief, has continued with [facility] for ROM. Therapist worked with him on some PROM and AAROM exercises. Associated symptoms include nothing. Notably, the patient denies any previous injury or prior.  Did recently have MRI and follow up with Dr. Yeo. No other concerns or complaints at this time.    PAST MEDICAL HISTORY:  1. Type 2 Diabetes mellitus, HgbA1c: 6.4 on 11/4/19  2. Hypertension    CURRENT MEDICATIONS:  1. Metformin  2. Simvastatin  3. Lisinopril  4. Chlorthalidone  5. Aspirin 81 mg daily    ALLERGIES:   NKDA    PAST SURGICAL HISTORY:  1. Patient denies any surgery.    FAMILY HISTORY: No known family history of bleeding, clotting, or anesthesia related complications.    SOCIAL HISTORY: Patient lives in [place], [place] in a house with his wife. He works full time as a . Enjoys playing video games and doing work projects around the house. Enjoys camping. Denies any athletic activities or exercise.    TOXIC HABITS:  I spoke with Thomas today regarding tobacco use and they informed me that they do not use any tobacco products.    REVIEW OF SYSTEMS:  General: Denies fevers, chills, or night sweats.  Skin: Denies rashes or lesions.  Head: Denies headache or dizziness.  Eyes: Denies vision changes or eye pain.  Ears: Denies ear pain or decreased hearing.  Nose: Denies nose bleeding or sinus pain.  Mouth & Throat: Denies bleeding gums or sore throat.  Neck: Denies neck pain or stiffness.  Respiratory: Denies cough, wheezing, sob, or hemoptysis.  Cardiovascular: Denies chest pain, chest pressure, or palpitations.   Gastrointestinal: Denies abdominal pain, nausea, vomiting, diarrhea, or constipation.  Genitourinary: Denies difficulty or pain with urination.   Musculoskeletal: As noted above in the HPI, otherwise normal.  Neuro: Denies paralysis, weakness, paresthesias, or speech difficulty.  Lymphatics: Denies  "lymphadenopathy.  Psych: Denies anxiety, sadness, or irritability.    PHYSICAL EXAM:  Patient is 6' 0\"[patient reported[ and weighs 280 lbs 0 oz. Pulse 82   Ht 1.829 m (6')   Wt 127 kg (280 lb)   BMI 37.97 kg/m     Unable to perform physical exam due to telephone visit.     IMAGING:   MRI of the left shoulder dated 3/9/2020 was personally reviewed by me.  This demonstrates a complete full-thickness supraspinatus tendon tear with retraction half the distance to the glenoid rim.  The tear does extend into full-thickness tearing of the anterior fibers of the infraspinatus tendon.  There is a full-thickness tear of the upper half to one third of the subscapularis tendon with mild medial subluxation of the proximal long head of the biceps tendon. Grade I atrophy of the subscapularis and supraspinatus, Grade II atrophy of the infraspinatus and teres minor.  There is degenerative labral tearing noted.  Overall well-maintained cartilaginous surfaces of the glenohumeral joint.  Advanced AC joint degenerative disease.  Subacromial bursitis noted.    IMPRESSION: 50 year old-year-old right hand dominant male, with left shoulder full thickness rotator cuff tear of the  supraspinatus, infraspinatus, and subscapularis.  Minimal to moderate atrophy.  Subacromial bursitis.  AC joint arthrosis. Work comp injury.    PLAN:   I discussed the treatment options with Thomas includin. Living with the symptoms.  2. Continued non-operative management.  3. Surgical intervention.     After going over these options, Thomas would like to proceed with left shoulder arthroscopy, subacromial decompression, rotator cuff repair and related procedures. We reviewed the risks and benefits of surgery including but not limited to the following: Risk of anesthesia, including death; infection; nerve/tendon/vessel injury; deep venous thrombosis (DVT), pulmonary embolism (PE); shoulder stiffness, possible need to treat the biceps tendon " including but not limited to tenotomy (cutting the tendon) or arthroscopic biceps tenodesis (securing the tendon into a bone socket in the humerus through an arthroscopic incision), possible need to address the acromioclavicular joint (AC joint); rotator cuff repair non-healing or re-tear; hardware- related problems; potential of rotator cuff repair irreparability (not able to repair the torn tendons), potential of the procedure to not alleviate the condition; and the potential need for further surgery in the future. We also discussed the possible use of biologic augmentation with a collagen scaffold or an allograft dermal (skin) graft depending on the tissue quality, tear size, and intraoperative determination of healing potential.     After going over these risks, benefits and alternatives Thomas would like to proceed with surgery.  Unfortunately, due to the current COVID-19 pandemic our hospital system is not allowing the scheduling of elective nonurgent surgeries.  As such we will maximize nonoperative medical treatment at this time for his left shoulder pain.  I will prescribe a course of nonsteroidal anti-inflammatory drugs with meloxicam 15 mg daily for 30 days with 1 refill.  Instructed him to take this with food.  Stop if any stomach discomfort or reflux.  He should continue his physical therapist instructed home exercise program and gentle range of motion exercises to avoid any stiffness.  I specifically encouraged him to work on supine exercises to try to regain overhead shoulder function.  It is very important to keep close follow-up on Thomas.  I would like to see him back for follow-up in 6-8 weeks for clinical exam, new left shoulder radiographs at that time, and possible discussion of scheduling surgery depending on symptoms at that time.    At the conclusion of the visit, Thomas verbally acknowledged that I answered all of his questions satisfactorily.      Telephone-Visit  Details    Type of service:  Telephone Visit    Total Telephone Visit time: 35 minutes    Originating Location (pt. Location): Home    Distant Location (provider location):  [facility]TH[facility]GERY     Mode of Communication:  Telephone    David Boyd MD

## 2020-04-20 ENCOUNTER — VIRTUAL VISIT (OUTPATIENT)
Dept: ORTHOPEDICS | Facility: CLINIC | Age: 50
End: 2020-04-20
Payer: OTHER MISCELLANEOUS

## 2020-04-20 VITALS — BODY MASS INDEX: 37.93 KG/M2 | HEIGHT: 72 IN | WEIGHT: 280 LBS | HEART RATE: 82 BPM

## 2020-04-20 DIAGNOSIS — S46.812A TRAUMATIC TEAR OF SUPRASPINATUS TENDON OF LEFT SHOULDER, INITIAL ENCOUNTER: Primary | ICD-10-CM

## 2020-04-20 PROCEDURE — 99203 OFFICE O/P NEW LOW 30 MIN: CPT | Mod: 95 | Performed by: ORTHOPAEDIC SURGERY

## 2020-04-20 RX ORDER — MELOXICAM 15 MG/1
15 TABLET ORAL DAILY
Qty: 30 TABLET | Refills: 1 | Status: SHIPPED | OUTPATIENT
Start: 2020-04-20 | End: 2020-05-07

## 2020-04-20 ASSESSMENT — MIFFLIN-ST. JEOR: SCORE: 2158.07

## 2020-04-30 ENCOUNTER — MYC MEDICAL ADVICE (OUTPATIENT)
Dept: ORTHOPEDICS | Facility: CLINIC | Age: 50
End: 2020-04-30

## 2020-05-04 NOTE — TELEPHONE ENCOUNTER
Patient being contacted by  team to schedule an appointment tomorrow with Dr. Boyd.    Kimberly Regan, ATC

## 2020-05-05 ENCOUNTER — ANCILLARY PROCEDURE (OUTPATIENT)
Dept: GENERAL RADIOLOGY | Facility: CLINIC | Age: 50
End: 2020-05-05
Attending: ORTHOPAEDIC SURGERY
Payer: OTHER MISCELLANEOUS

## 2020-05-05 ENCOUNTER — PREP FOR PROCEDURE (OUTPATIENT)
Dept: ORTHOPEDICS | Facility: CLINIC | Age: 50
End: 2020-05-05

## 2020-05-05 ENCOUNTER — OFFICE VISIT (OUTPATIENT)
Dept: ORTHOPEDICS | Facility: CLINIC | Age: 50
End: 2020-05-05
Payer: OTHER MISCELLANEOUS

## 2020-05-05 ENCOUNTER — TELEPHONE (OUTPATIENT)
Dept: ORTHOPEDICS | Facility: CLINIC | Age: 50
End: 2020-05-05

## 2020-05-05 VITALS
BODY MASS INDEX: 38.47 KG/M2 | WEIGHT: 284 LBS | HEIGHT: 72 IN | SYSTOLIC BLOOD PRESSURE: 122 MMHG | DIASTOLIC BLOOD PRESSURE: 82 MMHG

## 2020-05-05 DIAGNOSIS — S46.812A TRAUMATIC TEAR OF SUPRASPINATUS TENDON OF LEFT SHOULDER, INITIAL ENCOUNTER: Primary | ICD-10-CM

## 2020-05-05 DIAGNOSIS — G89.29 CHRONIC PAIN IN LEFT SHOULDER: ICD-10-CM

## 2020-05-05 DIAGNOSIS — M25.512 CHRONIC PAIN IN LEFT SHOULDER: ICD-10-CM

## 2020-05-05 DIAGNOSIS — S46.812A: Primary | ICD-10-CM

## 2020-05-05 PROCEDURE — 99214 OFFICE O/P EST MOD 30 MIN: CPT | Performed by: ORTHOPAEDIC SURGERY

## 2020-05-05 PROCEDURE — 73030 X-RAY EXAM OF SHOULDER: CPT | Mod: LT

## 2020-05-05 ASSESSMENT — MIFFLIN-ST. JEOR: SCORE: 2176.22

## 2020-05-05 NOTE — PROGRESS NOTES
"CHIEF COMPLAINT: Left shoulder pain    DIAGNOSIS: Work comp injury: Left shoulder full thickness rotator cuff tear of the  supraspinatus, infraspinatus, and subscapularis.  Minimal to moderate atrophy.  Subacromial bursitis.  AC joint arthrosis.    OCCUPATION/SPORT: , some manual work     HPI:   Thomas Pierce is a 50 year old, right-hand dominant male who presents for evaluation of left  shoulder pain since sustaining a work comp related slip and fall on the ice on 2/24/20. I had previously held a telehealth visit with Thomas on 4/20/20 due to the current COVID-19 pandemic. At that time, our organization and local rules and regulations were not allowing Tier 2 cases such as this to proceed with surgery, and this has changed over the past week so I am seeing Thomas today in person to discuss next steps. In the interim, Nick has continued his PT exercises and home exercise program in addition to NSAID therapy with Meloxicam 15 mg daily. Unfortunately, this has helped minimally overall and Thomas continues to have significant left shoulder pain and dysfunction with inability to reach his left hand above shoulder height. The pain is located to left upper arm \"pulling\" and tenderness in the anterior aspect of the shoulder.  Patient notes fatigue sensation of the superior shoulder and into the pec region. Worst pain is rated a 2 of 10, and current pain is rated at 0 of 10. Symptoms are worsened by use, reaching, abduction, overhead, lifting. He notes difficulty reaching or lifting the arm under it's own power. He reports significant pain with attempting to sleep at night. Symptoms are improved with rest and activity modification, but not to a satisfactory level. Patient has tried physical therapy with minimal relief, has continued with [facility] for ROM. Therapist worked with him on some PROM and AAROM exercises. Associated symptoms include radiating pain from the anterior shoulder " and into the biceps.  Notably, the patient denies any previous injury or prior shoulder surgery. He denies any numbness or tingling in the extremity. He denies any neck pain or difficulty. He did recently have MRI and follow up with Dr. Yeo. No other concerns or complaints at this time.    PAST MEDICAL HISTORY:  1. Type 2 Diabetes mellitus, HgbA1c: 6.4 on 11/4/19  2. Hypertension    CURRENT MEDICATIONS:  1. Metformin  2. Simvastatin  3. Lisinopril  4. Chlorthalidone  5. Aspirin 81 mg daily    ALLERGIES:   NKDA    PAST SURGICAL HISTORY:  1. Patient denies any surgery.    FAMILY HISTORY: No known family history of bleeding, clotting, or anesthesia related complications.    SOCIAL HISTORY: Patient lives in [place], [place] in a house with his wife. He works full time as a . Enjoys playing video games and doing work projects around the house. Enjoys camping. Denies any athletic activities or exercise.    TOXIC HABITS:  I spoke with Thomas today regarding tobacco use and they informed me that they do not use any tobacco products.    REVIEW OF SYSTEMS:  General: Denies fevers, chills, or night sweats.  Skin: Denies rashes or lesions.  Head: Denies headache or dizziness.  Eyes: Denies vision changes or eye pain.  Ears: Denies ear pain or decreased hearing.  Nose: Denies nose bleeding or sinus pain.  Mouth & Throat: Denies bleeding gums or sore throat.  Neck: Denies neck pain or stiffness.  Respiratory: Denies cough, wheezing, sob, or hemoptysis.  Cardiovascular: Denies chest pain, chest pressure, or palpitations.   Gastrointestinal: Denies abdominal pain, nausea, vomiting, diarrhea, or constipation.  Genitourinary: Denies difficulty or pain with urination.   Musculoskeletal: As noted above in the HPI, otherwise normal.  Neuro: Denies paralysis, weakness, paresthesias, or speech difficulty.  Lymphatics: Denies lymphadenopathy.  Psych: Denies anxiety, sadness, or irritability.    PHYSICAL  "EXAM:  Patient is 6' 0\" and weighs 284 lbs 0 oz. /82 (BP Location: Left arm, Patient Position: Chair, Cuff Size: Adult Large)   Ht 1.829 m (6')   Wt 128.8 kg (284 lb)   BMI 38.52 kg/m    Constitutional: Well-developed, well-nourished, healthy appearing obese male.  Skin: Warm, dry, and without rashes.   Cardiac: Well perfused extremities, strong 2+ peripheral pulses. No edema.   Pulmonary: Non-labored respirations on room air without audible wheezes.   Abdomen: Soft, obese, nontender.   Musculoskeletal: Patient ambulates with a slow, symmetric, and steady gait. Active range of motion of the right shoulder is 170/85/60/T7 compared to 45/5/BP on the left (patient unable to get left shoulder into 90/90 position). PROM of the left shoulder is 160/45/T10 limited by significant pain, but findings consistent with pseudoparalysis. Left shoulder has positive Neer, Hassan, Paulina, ER lag sign, pain with bear hug, Speed's, Upper cut test, all negative on the right side. No AC, Sternoclavicular, cross-body adduction, scars, atrophy, deformity, belly-press, lift-off, internal rotation lag sign, bilaterally. Unable to test for hornblower's as patient cannot tolerate the 90-90 position. No generalized ligamentous laxity. Neurovascular exam and cervical spine exam are normal. Negative Spurling's.    IMAGING:   MRI of the left shoulder dated 3/9/2020 was personally reviewed by me.  This demonstrates a complete full-thickness supraspinatus tendon tear with retraction half the distance to the glenoid rim.  The tear does extend into full-thickness tearing of the anterior infraspinatus tendon.  There is a full-thickness tear of the upper half to one third of the subscapularis tendon with medial subluxation of the proximal long head of the biceps tendon. Grade I atrophy of the subscapularis and supraspinatus, Grade II atrophy of the infraspinatus and teres minor.  There is degenerative labral tearing noted.  Overall " well-maintained cartilaginous surfaces of the glenohumeral joint.  Advanced AC joint degenerative disease.  Subacromial bursitis noted.    IMPRESSION: 50 year old-year-old right hand dominant male, with the followin. Left shoulder full thickness rotator cuff tear of the  supraspinatus, infraspinatus, and subscapularis.  Grade I-II atrophy.    2. Left shoulder long head of biceps tendinopathy.  3. Left shoulder subacromial bursitis.   4. Left shoulder work comp injury.  5. Type II diabetes mellitus controlled with Metformin, HgbA1c: 6.4 on 19 (will re-check pre-op).  6. Obesity, BMI 38.5     PLAN:   I discussed the treatment options with Thomas includin. Living with the symptoms.  2. Continued non-operative management.  3. Surgical intervention.     After going over these options and the expected recovery process, Thomas would like to proceed with left shoulder arthroscopy, subacromial decompression, rotator cuff repair, biceps tenodesis, possible debridement, and related procedures. We reviewed the risks and benefits of surgery including but not limited to the following: Risk of anesthesia, including death; infection; nerve/tendon/vessel injury; deep venous thrombosis (DVT), pulmonary embolism (PE); shoulder stiffness, possible need to treat the biceps tendon including but not limited to tenotomy (cutting the tendon) or arthroscopic biceps tenodesis (securing the tendon into a bone socket in the humerus through an arthroscopic incision), possible need to address the acromioclavicular joint (AC joint); rotator cuff repair non-healing or re-tear; hardware- related problems; potential of rotator cuff repair irreparability (not able to repair the torn tendons), potential of the procedure to not alleviate the condition; and the potential need for further surgery in the future. We also discussed the possible use of biologic augmentation with a collagen scaffold or an allograft dermal (skin) graft  depending on the tissue quality, tear size, and intraoperative determination of healing potential. Important to note, I discussed with Thomas that he is at a higher risk of complications and decreased overall outcomes due to the worker's compensation nature of the injury, his diabetes, and his teres minor atrophy. He expressed understanding of these issues.    After going over these risks, benefits and alternatives Thomas would like to proceed with surgery. At this time, our institution is now allowing us to proceed with Tier 2 surgical cases. Given the patient's young age, pseudoparalysis, early atrophy, and tear acuity, I have discussed this case and reviewed all imaging with my two senior [facility] trained partners (Dr. Warner and Dr. Linares), who both agree that this surgery should not be delayed due to the COVID-19 pandemic any further due to the risk of the tear progressing to irreparability. As such, a surgical case request was placed, all necessary paperwork was completed, and the patient will need to be tested for COVID-19 per our institutional policy.     I will see Thomas back on the date of surgery. He will call with any questions in the interim.    At the conclusion of the visit, Thomas verbally acknowledged that I answered all of his questions satisfactorily.    David Boyd MD

## 2020-05-05 NOTE — LETTER
"    5/5/2020         RE: Thomas Pierce  [address] [facility] [address]        Dear Colleague,    Thank you for referring your patient, Thomas Pierce, to the [facility] ORTHOPEDIC SURGERY. Please see a copy of my visit note below.    CHIEF COMPLAINT: Left shoulder pain    DIAGNOSIS: Work comp injury: Left shoulder full thickness rotator cuff tear of the  supraspinatus, infraspinatus, and subscapularis.  Minimal to moderate atrophy.  Subacromial bursitis.  AC joint arthrosis.    OCCUPATION/SPORT: , some manual work     HPI:   Thomas Pierce is a 50 year old, right-hand dominant male who presents for evaluation of left  shoulder pain since sustaining a work comp related slip and fall on the ice on 2/24/20. I had previously held a telehealth visit with Thomas on 4/20/20 due to the current COVID-19 pandemic. At that time, our organization and local rules and regulations were not allowing Tier 2 cases such as this to proceed with surgery, and this has changed over the past week so I am seeing Thomas today in person to discuss next steps. In the interim, Nick has continued his PT exercises and home exercise program in addition to NSAID therapy with Meloxicam 15 mg daily. Unfortunately, this has helped minimally overall and Thomas continues to have significant left shoulder pain and dysfunction with inability to reach his left hand above shoulder height. The pain is located to left upper arm \"pulling\" and tenderness in the anterior aspect of the shoulder.  Patient notes fatigue sensation of the superior shoulder and into the pec region. Worst pain is rated a 2 of 10, and current pain is rated at 0 of 10. Symptoms are worsened by use, reaching, abduction, overhead, lifting. He notes difficulty reaching or lifting the arm under it's own power. He reports significant pain with attempting to sleep at night. Symptoms are improved with rest and activity " modification, but not to a satisfactory level. Patient has tried physical therapy with minimal relief, has continued with [facility] for [facility]. Therapist worked with him on some PROM and AAROM exercises. Associated symptoms include radiating pain from the anterior shoulder and into the biceps.  Notably, the patient denies any previous injury or prior shoulder surgery. He denies any numbness or tingling in the extremity. He denies any neck pain or difficulty. He did recently have MRI and follow up with Dr. Yeo. No other concerns or complaints at this time.    PAST MEDICAL HISTORY:  1. Type 2 Diabetes mellitus, HgbA1c: 6.4 on 11/4/19  2. Hypertension    CURRENT MEDICATIONS:  1. Metformin  2. Simvastatin  3. Lisinopril  4. Chlorthalidone  5. Aspirin 81 mg daily    ALLERGIES:   NKDA    PAST SURGICAL HISTORY:  1. Patient denies any surgery.    FAMILY HISTORY: No known family history of bleeding, clotting, or anesthesia related complications.    SOCIAL HISTORY: Patient lives in [place], [place] in a house with his wife. He works full time as a . Enjoys playing video games and doing work projects around the house. Enjoys camping. Denies any athletic activities or exercise.    TOXIC HABITS:  I spoke with Thomas today regarding tobacco use and they informed me that they do not use any tobacco products.    REVIEW OF SYSTEMS:  General: Denies fevers, chills, or night sweats.  Skin: Denies rashes or lesions.  Head: Denies headache or dizziness.  Eyes: Denies vision changes or eye pain.  Ears: Denies ear pain or decreased hearing.  Nose: Denies nose bleeding or sinus pain.  Mouth & Throat: Denies bleeding gums or sore throat.  Neck: Denies neck pain or stiffness.  Respiratory: Denies cough, wheezing, sob, or hemoptysis.  Cardiovascular: Denies chest pain, chest pressure, or palpitations.   Gastrointestinal: Denies abdominal pain, nausea, vomiting, diarrhea, or constipation.  Genitourinary: Denies difficulty or pain  "with urination.   Musculoskeletal: As noted above in the HPI, otherwise normal.  Neuro: Denies paralysis, weakness, paresthesias, or speech difficulty.  Lymphatics: Denies lymphadenopathy.  Psych: Denies anxiety, sadness, or irritability.    PHYSICAL EXAM:  Patient is 6' 0\" and weighs 284 lbs 0 oz. /82 (BP Location: Left arm, Patient Position: Chair, Cuff Size: Adult Large)   Ht 1.829 m (6')   Wt 128.8 kg (284 lb)   BMI 38.52 kg/m    Constitutional: Well-developed, well-nourished, healthy appearing obese male.  Skin: Warm, dry, and without rashes.   Cardiac: Well perfused extremities, strong 2+ peripheral pulses. No edema.   Pulmonary: Non-labored respirations on room air without audible wheezes.   Abdomen: Soft, obese, nontender.   Musculoskeletal: Patient ambulates with a slow, symmetric, and steady gait. Active range of motion of the right shoulder is 170/85/60/T7 compared to 45/5/BP on the left (patient unable to get left shoulder into 90/90 position). PROM of the left shoulder is 160/45/T10 limited by significant pain, but findings consistent with pseudoparalysis. Left shoulder has positive Neer, Hassan, Paulina, ER lag sign, pain with bear hug, Speed's, Upper cut test, all negative on the right side. No AC, Sternoclavicular, cross-body adduction, scars, atrophy, deformity, belly-press, lift-off, internal rotation lag sign, bilaterally. Unable to test for hornblower's as patient cannot tolerate the 90-90 position. No generalized ligamentous laxity. Neurovascular exam and cervical spine exam are normal. Negative Spurling's.    IMAGING:   MRI of the left shoulder dated 3/9/2020 was personally reviewed by me.  This demonstrates a complete full-thickness supraspinatus tendon tear with retraction half the distance to the glenoid rim.  The tear does extend into full-thickness tearing of the anterior infraspinatus tendon.  There is a full-thickness tear of the upper half to one third of the subscapularis tendon " with medial subluxation of the proximal long head of the biceps tendon. Grade I atrophy of the subscapularis and supraspinatus, Grade II atrophy of the infraspinatus and teres minor.  There is degenerative labral tearing noted.  Overall well-maintained cartilaginous surfaces of the glenohumeral joint.  Advanced AC joint degenerative disease.  Subacromial bursitis noted.    IMPRESSION: 50 year old-year-old right hand dominant male, with the followin. Left shoulder full thickness rotator cuff tear of the  supraspinatus, infraspinatus, and subscapularis.  Grade I-II atrophy.    2. Left shoulder long head of biceps tendinopathy.  3. Left shoulder subacromial bursitis.   4. Left shoulder work comp injury.  5. Type II diabetes mellitus controlled with Metformin, HgbA1c: 6.4 on 19 (will re-check pre-op).  6. Obesity, BMI 38.5     PLAN:   I discussed the treatment options with Thomas includin. Living with the symptoms.  2. Continued non-operative management.  3. Surgical intervention.     After going over these options and the expected recovery process, Thomas would like to proceed with left shoulder arthroscopy, subacromial decompression, rotator cuff repair, biceps tenodesis, possible debridement, and related procedures. We reviewed the risks and benefits of surgery including but not limited to the following: Risk of anesthesia, including death; infection; nerve/tendon/vessel injury; deep venous thrombosis (DVT), pulmonary embolism (PE); shoulder stiffness, possible need to treat the biceps tendon including but not limited to tenotomy (cutting the tendon) or arthroscopic biceps tenodesis (securing the tendon into a bone socket in the humerus through an arthroscopic incision), possible need to address the acromioclavicular joint (AC joint); rotator cuff repair non-healing or re-tear; hardware- related problems; potential of rotator cuff repair irreparability (not able to repair the torn tendons),  potential of the procedure to not alleviate the condition; and the potential need for further surgery in the future. We also discussed the possible use of biologic augmentation with a collagen scaffold or an allograft dermal (skin) graft depending on the tissue quality, tear size, and intraoperative determination of healing potential. Important to note, I discussed with Thomas that he is at a higher risk of complications and decreased overall outcomes due to the worker's compensation nature of the injury, his diabetes, and his teres minor atrophy. He expressed understanding of these issues.    After going over these risks, benefits and alternatives Thomas would like to proceed with surgery. At this time, our institution is now allowing us to proceed with Tier 2 surgical cases. Given the patient's young age, pseudoparalysis, early atrophy, and tear acuity, I have discussed this case and reviewed all imaging with my two senior [facility] trained partners (Dr. Warner and Dr. Linares), who both agree that this surgery should not be delayed due to the COVID-19 pandemic any further due to the risk of the tear progressing to irreparability. As such, a surgical case request was placed, all necessary paperwork was completed, and the patient will need to be tested for COVID-19 per our institutional policy.     I will see Thomas back on the date of surgery. He will call with any questions in the interim.    At the conclusion of the visit, Thomas verbally acknowledged that I answered all of his questions satisfactorily.    David Boyd MD      Again, thank you for allowing me to participate in the care of your patient.        Sincerely,        David Boyd MD

## 2020-05-05 NOTE — PATIENT INSTRUCTIONS
Schedule surgery with Reddy:[phone].     Call my office with any questions or concerns [phone], option 2, option 3.

## 2020-05-05 NOTE — TELEPHONE ENCOUNTER
Was not able to schedule surgery as the [facility] surgery scheduler office is closed for the day.  Dr Boyd would like to schedule surgery for Friday 5/8/20 or Tuesday 5/12/20.     Patient did schedule H&P for 5/7/20 and was instructed to call Central scheduling to schedule Covid test.     Case request entered, Waiting for 2nd sign from Dr Boyd.      Reddy Carrillo, Surgery Scheduler

## 2020-05-06 PROBLEM — S46.812A TRAUMATIC TEAR OF SUPRASPINATUS TENDON OF LEFT SHOULDER: Status: ACTIVE | Noted: 2020-05-06

## 2020-05-06 PROBLEM — S49.92XD INJURY OF LEFT SHOULDER, SUBSEQUENT ENCOUNTER: Status: RESOLVED | Noted: 2020-03-04 | Resolved: 2020-05-06

## 2020-05-06 NOTE — TELEPHONE ENCOUNTER
Scheduled surgery.     W/C case. DOI 2/24/20      Type of surgery: left arthroscopic RCR, biceps tenodesis, subacromial decompression, possible debridement  Location of surgery: [facility]  Date and time of surgery: 5/12/20 @ 0800  Surgeon: Deb  Pre-Op Appt Date: 5/7/20  Post-Op Appt Date: 5/26/20   Packet sent out: Yes  Pre-cert/Authorization completed:  emailed Tayo  Date: 5/6/20      Reddy Carrillo, Surgery Scheduler

## 2020-05-06 NOTE — PROGRESS NOTES
[address] [address]  [phone]  Dept: [phone]    PRE-OP EVALUATION:  Today's date: 2020    Thomas Pierce (: 1970) presents for pre-operative evaluation assessment as requested by Dr. Boyd.  He requires evaluation and anesthesia risk assessment prior to undergoing surgery/procedure for treatment of left shoulder rotator cuff tear .    Proposed Surgery/ Procedure: LEFT SHOULDER ARTHROSCOPIC ROTATOR CUFF REPAIR, BICEPS TENODESIS, SUBACROMIAL DECOMPRESSION, POSSIBLE DEBRIDEMENT  Date of Surgery/ Procedure: 2020  Time of Surgery/ Procedure: 8am  Hospital/Surgical Facility: [facility]  Fax number for surgical facility:   Primary Physician: [facility], [facility]  Type of Anesthesia Anticipated: Choice    Patient has a Health Care Directive or Living Will:  NO    1. NO - Do you have a history of heart attack, stroke, stent, bypass or surgery on an artery in the head, neck, heart or legs?  2. NO - Do you ever have any pain or discomfort in your chest?  3. NO - Do you have a history of  Heart Failure?  4. NO - Are you troubled by shortness of breath when: walking on the level, up a slight hill or at night?  5. NO - Do you currently have a cold, bronchitis or other respiratory infection?  6. NO - Do you have a cough, shortness of breath or wheezing?  7. NO - Do you sometimes get pains in the calves of your legs when you walk?  8. NO - Do you or anyone in your family have previous history of blood clots?  9. NO - Do you or does anyone in your family have a serious bleeding problem such as prolonged bleeding following surgeries or cuts?  10. NO - Have you ever had problems with anemia or been told to take iron pills?  11. NO - Have you had any abnormal blood loss such as black, tarry or bloody stools, or abnormal vaginal bleeding?  12. NO - Have you ever had a blood  transfusion?  13. NO - Have you or any of your relatives ever had problems with anesthesia?  14. NO - Do you have sleep apnea, excessive snoring or daytime drowsiness?  15. NO - Do you have any prosthetic heart valves?  16. NO - Do you have prosthetic joints?  17. NO - Is there any chance that you may be pregnant?      HPI:     HPI related to upcoming procedure: Patient slipped on ice in March and sustained a traumatic tear of the supraspinatus tendon.        DIABETES - Patient has a longstanding history of DiabetesType Type II . Patient is being treated with oral agents and denies significant side effects. Control has been good. Complicating factors include but are not limited to: morbid obesity .     HYPERLIPIDEMIA - Patient has a long history of significant Hyperlipidemia requiring medication for treatment with recent fair control. Patient reports no problems or side effects with the medication.     HYPERTENSION - Patient has longstanding history of HTN , currently denies any symptoms referable to elevated blood pressure. Specifically denies chest pain, palpitations, dyspnea, orthopnea, PND or peripheral edema. Blood pressure readings have been in normal range. Current medication regimen is as listed below. Patient denies any side effects of medication.       MEDICAL HISTORY:     Patient Active Problem List    Diagnosis Date Noted     Uncontrolled type 2 diabetes mellitus with hyperglycemia (H) 05/07/2020     Priority: Medium     Traumatic tear of supraspinatus tendon of left shoulder 05/06/2020     Priority: Medium     Added automatically from request for surgery [id number]       Hyperlipidemia LDL goal <100 10/25/2018     Priority: Medium     Type 2 diabetes mellitus without complication, without long-term current use of insulin (H) 10/16/2018     Priority: Medium     Severe obesity (BMI 35.0-39.9) with comorbidity (H) 10/30/2017     Priority: Medium     Hypertension goal BP (blood pressure) < 140/90 10/20/2011      Priority: Medium      Past Medical History:   Diagnosis Date     Hyperlipidemia LDL goal <160 10/20/2011     Hypertension goal BP (blood pressure) < 140/90 10/20/2011     Prediabetes      Past Surgical History:   Procedure Laterality Date     MANDIBLE SURGERY  1978    trauma while sledding     Current Outpatient Medications   Medication Sig Dispense Refill     blood glucose monitoring (ROSEANNE MICROLET) lancets Use to test blood sugar 1-2 times daily. 100 each 6     blood glucose monitoring (NO BRAND SPECIFIED) test strip Use to test blood sugars 2-4 times daily or as directed 100 strip 3     Blood Glucose Monitoring Suppl (CONTOUR NEXT ONE) KIT 1 each once for 1 dose Use to test blood sugar 1-2 times daily or as directed.       chlorthalidone (HYGROTON) 25 MG tablet TAKE 1 TABLET(25 MG) BY MOUTH DAILY 90 tablet 3     CONTOUR NEXT TEST test strip Use to test blood sugar 1-2 times daily. 100 strip 3     lisinopril (PRINIVIL/ZESTRIL) 40 MG tablet TAKE 1 TABLET(40 MG) BY MOUTH DAILY 90 tablet 3     metFORMIN (GLUCOPHAGE-XR) 500 MG 24 hr tablet Take 2 tablets (1,000 mg) by mouth At Bedtime for 7 days, THEN 3 tablets (1,500 mg) At Bedtime for 7 days, THEN 4 tablets (2,000 mg) At Bedtime. 360 tablet 1     simvastatin (ZOCOR) 40 MG tablet Take 1 tablet (40 mg) by mouth At Bedtime 90 tablet 3     aspirin 81 MG tablet Take 1 tablet by mouth daily.       OTC products: no recent use of OTC ASA, NSAIDS or Steroids    No Known Allergies   Latex Allergy: NO    Social History     Tobacco Use     Smoking status: Never Smoker     Smokeless tobacco: Never Used   Substance Use Topics     Alcohol use: Yes     Comment: occasionally      History   Drug Use No       REVIEW OF SYSTEMS:   Constitutional, neuro, ENT, endocrine, pulmonary, cardiac, gastrointestinal, genitourinary, musculoskeletal, integument and psychiatric systems are negative, except as otherwise noted.    EXAM:   /89 (BP Location: Left arm, Cuff Size: Adult Large)    Pulse 107   Temp 97  F (36.1  C) (Tympanic)   Ht 1.829 m (6')   Wt 127.9 kg (282 lb)   SpO2 95%   BMI 38.25 kg/m      GENERAL APPEARANCE: healthy, alert and no distress     EYES: EOMI,  PERRL     HENT: ear canals and TM's normal and nose and mouth without ulcers or lesions     NECK: no adenopathy, no asymmetry, masses, or scars and thyroid normal to palpation     RESP: lungs clear to auscultation - no rales, rhonchi or wheezes     CV: regular rates and rhythm, normal S1 S2, no S3 or S4 and no murmur, click or rub     ABDOMEN:  soft, nontender, no HSM or masses and bowel sounds normal     MS: extremities normal- no gross deformities noted, no evidence of inflammation in joints, FROM in all extremities.     SKIN: no suspicious lesions or rashes     NEURO: Normal strength and tone, sensory exam grossly normal, mentation intact and speech normal     PSYCH: mentation appears normal. and affect normal/bright     LYMPHATICS: No cervical adenopathy    DIAGNOSTICS:   EKG: appears normal, NSR, normal axis, normal intervals, no acute ST/T changes c/w ischemia, no LVH by voltage criteria, no previous tracings available    Results for orders placed or performed in visit on 05/07/20   Comprehensive metabolic panel (BMP + Alb, Alk Phos, ALT, AST, Total. Bili, TP)     Status: Abnormal   Result Value Ref Range    Sodium 134 133 - 144 mmol/L    Potassium 3.6 3.4 - 5.3 mmol/L    Chloride 102 94 - 109 mmol/L    Carbon Dioxide 25 20 - 32 mmol/L    Anion Gap 7 3 - 14 mmol/L    Glucose 168 (H) 70 - 99 mg/dL    Urea Nitrogen 16 7 - 30 mg/dL    Creatinine 0.83 0.66 - 1.25 mg/dL    GFR Estimate >90 >60 mL/min/[1.73_m2]    GFR Estimate If Black >90 >60 mL/min/[1.73_m2]    Calcium 9.3 8.5 - 10.1 mg/dL    Bilirubin Total 0.6 0.2 - 1.3 mg/dL    Albumin 3.9 3.4 - 5.0 g/dL    Protein Total 8.1 6.8 - 8.8 g/dL    Alkaline Phosphatase 69 40 - 150 U/L    ALT 88 (H) 0 - 70 U/L    AST 42 0 - 45 U/L   Hemoglobin A1c     Status: Abnormal   Result  Value Ref Range    Hemoglobin A1C 8.3 (H) 0 - 5.6 %   CBC with platelets     Status: None   Result Value Ref Range    WBC 8.3 4.0 - 11.0 10e9/L    RBC Count 5.29 4.4 - 5.9 10e12/L    Hemoglobin 16.1 13.3 - 17.7 g/dL    Hematocrit 44.7 40.0 - 53.0 %    MCV 85 78 - 100 fl    MCH 30.4 26.5 - 33.0 pg    MCHC 36.0 31.5 - 36.5 g/dL    RDW 12.3 10.0 - 15.0 %    Platelet Count 258 150 - 450 10e9/L   Lipid panel reflex to direct LDL Fasting     Status: Abnormal   Result Value Ref Range    Cholesterol 206 (H) <200 mg/dL    Triglycerides 252 (H) <150 mg/dL    HDL Cholesterol 46 >39 mg/dL    LDL Cholesterol Calculated 110 (H) <100 mg/dL    Non HDL Cholesterol 160 (H) <130 mg/dL       Recent Labs   Lab Test 11/04/19  1747 01/29/19  0855 10/16/18  1142     --  136   POTASSIUM 3.7  --  3.6   CR 0.80  --  0.86   A1C 6.4* 6.1* 7.0*        IMPRESSION:   Reason for surgery/procedure: Left shoulder rotator cuff tear  Diagnosis/reason for consult: Preoperative clearance    The proposed surgical procedure is considered INTERMEDIATE risk.    REVISED CARDIAC RISK INDEX  The patient has the following serious cardiovascular risks for perioperative complications such as (MI, PE, VFib and 3  AV Block):  No serious cardiac risks  INTERPRETATION: 0 risks: Class I (very low risk - 0.4% complication rate)    The patient has the following additional risks for perioperative complications:  Morbid obesity - positioning concerns during surgery  DM not optimized - watch for non healing and infections postoperatively    Thomas was seen today for pre-op exam.    Diagnoses and all orders for this visit:    Preop general physical exam, Traumatic tear of supraspinatus tendon of left shoulder, subsequent encounter  -     EKG 12-lead complete w/read - Clinics  -     CBC with platelets    Severe obesity (BMI 35.0-39.9) with comorbidity (H)  Diet/exercise efforts encouraged.    Type 2 diabetes mellitus without complication, without long-term current  use of insulin (H)  Suboptimally controlled.  Gradually increase metformin to 2000 mg nightly.  If any GI side effects, stop at highest tolerated dose.  DM follow up in 3 month.  New Contour Next meter given to pt in clinic today.  Strips & lancets will be sent to pharmacy.  Recommend checking at least once daily (fasting am) and keeping log.    -     Comprehensive metabolic panel (BMP + Alb, Alk Phos, ALT, AST, Total. Bili, TP)  -     Hemoglobin A1c  -     metFORMIN (GLUCOPHAGE-XR) 500 MG 24 hr tablet; Take 2 tablets (1,000 mg) by mouth At Bedtime for 7 days, THEN 3 tablets (1,500 mg) At Bedtime for 7 days, THEN 4 tablets (2,000 mg) At Bedtime.    Hyperlipidemia LDL goal <100  Historically suboptimally controlled.  Advised pt that if results are elevated may change from simvastatin to more potent statin.  Pt voiced agreement & understanding.  -     Lipid panel reflex to direct LDL Fasting    Hypertension goal BP (blood pressure) < 140/90  Controlled, however JUST within range.  Will continue to monitor.  -     Comprehensive metabolic panel (BMP + Alb, Alk Phos, ALT, AST, Total. Bili, TP)    Encounter for FIT (fecal immunochemical test) screening  Due for screening.  Will complete at home and do colonoscopy after healed from shoulder surgery.  -     Fecal colorectal cancer screen (FIT); Future          RECOMMENDATIONS:       --Patient is to take all scheduled medications on the day of surgery EXCEPT for modifications listed below.  Night prior to procedure: HOLD: metformin and lisinopril    For 7 days prior to procedure: Hold all vitamins/supplements.  Hold all NSAID medications (ibuprofen/motrin/aleve and prescribed MELOXICAM) and aspirin.  Tylenol products OK.      APPROVAL GIVEN to proceed with proposed procedure, without further diagnostic evaluation       Signed Electronically by: Jasmyn Giles PA-C    I have reviewed today's vital signs, medications, labs and H &P. I agree with Jasmyn Giles PA-C 's  assessment and plan as above.        Arminda Alba MD        Copy of this evaluation report is provided to requesting physician.    [place] Preop Guidelines    Revised Cardiac Risk Index

## 2020-05-06 NOTE — PATIENT INSTRUCTIONS
Night prior to procedure: HOLD: metformin and lisinopril    For 7 days prior to procedure: Hold all vitamins/supplements.  Hold all NSAID medications (ibuprofen/motrin/aleve and prescribed MELOXICAM) and aspirin.  Tylenol products OK.          Before Your Surgery      Call your surgeon if there is any change in your health. This includes signs of a cold or flu (such as a sore throat, runny nose, cough, rash or fever).    Do not smoke, drink alcohol or take over the counter medicine (unless your surgeon or primary care doctor tells you to) for the 24 hours before and after surgery.    If you take prescribed drugs: Follow your doctor s orders about which medicines to take and which to stop until after surgery.    Eating and drinking prior to surgery: follow the instructions from your surgeon    Take a shower or bath the night before surgery. Use the soap your surgeon gave you to gently clean your skin. If you do not have soap from your surgeon, use your regular soap. Do not shave or scrub the surgery site.  Wear clean pajamas and have clean sheets on your bed.

## 2020-05-07 ENCOUNTER — OFFICE VISIT (OUTPATIENT)
Dept: FAMILY MEDICINE | Facility: CLINIC | Age: 50
End: 2020-05-07
Payer: OTHER MISCELLANEOUS

## 2020-05-07 VITALS
DIASTOLIC BLOOD PRESSURE: 89 MMHG | BODY MASS INDEX: 38.19 KG/M2 | SYSTOLIC BLOOD PRESSURE: 134 MMHG | HEIGHT: 72 IN | OXYGEN SATURATION: 95 % | TEMPERATURE: 97 F | HEART RATE: 107 BPM | WEIGHT: 282 LBS

## 2020-05-07 DIAGNOSIS — Z01.818 PREOP GENERAL PHYSICAL EXAM: Primary | ICD-10-CM

## 2020-05-07 DIAGNOSIS — E78.5 HYPERLIPIDEMIA LDL GOAL <100: ICD-10-CM

## 2020-05-07 DIAGNOSIS — E11.65 UNCONTROLLED TYPE 2 DIABETES MELLITUS WITH HYPERGLYCEMIA (H): ICD-10-CM

## 2020-05-07 DIAGNOSIS — Z12.11 ENCOUNTER FOR FIT (FECAL IMMUNOCHEMICAL TEST) SCREENING: ICD-10-CM

## 2020-05-07 DIAGNOSIS — I10 HYPERTENSION GOAL BP (BLOOD PRESSURE) < 140/90: ICD-10-CM

## 2020-05-07 DIAGNOSIS — E66.01 SEVERE OBESITY (BMI 35.0-39.9) WITH COMORBIDITY (H): ICD-10-CM

## 2020-05-07 DIAGNOSIS — E11.9 TYPE 2 DIABETES MELLITUS WITHOUT COMPLICATION, WITHOUT LONG-TERM CURRENT USE OF INSULIN (H): ICD-10-CM

## 2020-05-07 DIAGNOSIS — S46.812D TRAUMATIC TEAR OF SUPRASPINATUS TENDON OF LEFT SHOULDER, SUBSEQUENT ENCOUNTER: ICD-10-CM

## 2020-05-07 PROBLEM — M25.512 ACUTE PAIN OF LEFT SHOULDER: Status: RESOLVED | Noted: 2020-03-04 | Resolved: 2020-05-07

## 2020-05-07 LAB
ALBUMIN SERPL-MCNC: 3.9 G/DL (ref 3.4–5)
ALP SERPL-CCNC: 69 U/L (ref 40–150)
ALT SERPL W P-5'-P-CCNC: 88 U/L (ref 0–70)
ANION GAP SERPL CALCULATED.3IONS-SCNC: 7 MMOL/L (ref 3–14)
AST SERPL W P-5'-P-CCNC: 42 U/L (ref 0–45)
BILIRUB SERPL-MCNC: 0.6 MG/DL (ref 0.2–1.3)
BUN SERPL-MCNC: 16 MG/DL (ref 7–30)
CALCIUM SERPL-MCNC: 9.3 MG/DL (ref 8.5–10.1)
CHLORIDE SERPL-SCNC: 102 MMOL/L (ref 94–109)
CHOLEST SERPL-MCNC: 206 MG/DL
CO2 SERPL-SCNC: 25 MMOL/L (ref 20–32)
CREAT SERPL-MCNC: 0.83 MG/DL (ref 0.66–1.25)
ERYTHROCYTE [DISTWIDTH] IN BLOOD BY AUTOMATED COUNT: 12.3 % (ref 10–15)
GFR SERPL CREATININE-BSD FRML MDRD: >90 ML/MIN/{1.73_M2}
GLUCOSE SERPL-MCNC: 168 MG/DL (ref 70–99)
HBA1C MFR BLD: 8.3 % (ref 0–5.6)
HCT VFR BLD AUTO: 44.7 % (ref 40–53)
HDLC SERPL-MCNC: 46 MG/DL
HGB BLD-MCNC: 16.1 G/DL (ref 13.3–17.7)
LDLC SERPL CALC-MCNC: 110 MG/DL
MCH RBC QN AUTO: 30.4 PG (ref 26.5–33)
MCHC RBC AUTO-ENTMCNC: 36 G/DL (ref 31.5–36.5)
MCV RBC AUTO: 85 FL (ref 78–100)
NONHDLC SERPL-MCNC: 160 MG/DL
PLATELET # BLD AUTO: 258 10E9/L (ref 150–450)
POTASSIUM SERPL-SCNC: 3.6 MMOL/L (ref 3.4–5.3)
PROT SERPL-MCNC: 8.1 G/DL (ref 6.8–8.8)
RBC # BLD AUTO: 5.29 10E12/L (ref 4.4–5.9)
SODIUM SERPL-SCNC: 134 MMOL/L (ref 133–144)
TRIGL SERPL-MCNC: 252 MG/DL
WBC # BLD AUTO: 8.3 10E9/L (ref 4–11)

## 2020-05-07 PROCEDURE — 99215 OFFICE O/P EST HI 40 MIN: CPT | Performed by: PHYSICIAN ASSISTANT

## 2020-05-07 PROCEDURE — 80061 LIPID PANEL: CPT | Performed by: PHYSICIAN ASSISTANT

## 2020-05-07 PROCEDURE — 83036 HEMOGLOBIN GLYCOSYLATED A1C: CPT | Performed by: PHYSICIAN ASSISTANT

## 2020-05-07 PROCEDURE — 36415 COLL VENOUS BLD VENIPUNCTURE: CPT | Performed by: PHYSICIAN ASSISTANT

## 2020-05-07 PROCEDURE — 85027 COMPLETE CBC AUTOMATED: CPT | Performed by: PHYSICIAN ASSISTANT

## 2020-05-07 PROCEDURE — 80053 COMPREHEN METABOLIC PANEL: CPT | Performed by: PHYSICIAN ASSISTANT

## 2020-05-07 PROCEDURE — 93000 ELECTROCARDIOGRAM COMPLETE: CPT | Performed by: PHYSICIAN ASSISTANT

## 2020-05-07 RX ORDER — BLOOD-GLUCOSE METER
1 EACH MISCELLANEOUS ONCE
Start: 2020-05-07 | End: 2020-05-07

## 2020-05-07 RX ORDER — ROSUVASTATIN CALCIUM 20 MG/1
20 TABLET, COATED ORAL AT BEDTIME
Qty: 90 TABLET | Refills: 1 | Status: SHIPPED | OUTPATIENT
Start: 2020-05-07 | End: 2021-01-28

## 2020-05-07 RX ORDER — METFORMIN HCL 500 MG
TABLET, EXTENDED RELEASE 24 HR ORAL
Qty: 360 TABLET | Refills: 1 | Status: SHIPPED | OUTPATIENT
Start: 2020-05-07 | End: 2021-01-28

## 2020-05-07 ASSESSMENT — MIFFLIN-ST. JEOR: SCORE: 2167.14

## 2020-05-07 NOTE — Clinical Note
JV-  Please cosign when able, thanks!    Team: please fax preop & EKG once cosigned.    Jasmyn Giles MBA, MS, PA-C  [facility]

## 2020-05-07 NOTE — RESULT ENCOUNTER NOTE
Thomas  I have reviewed your recent labs. Here are the results:    -Normal red blood cell (hgb) levels, normal white blood cell count and normal platelet levels.  -Your cholesterol is still not optimally controlled despite your simvastatin medication.  I would like to change your medication to rosuvastatin 20 mg nightly.  This will REPLACE your simvastatin.  Recheck your fasting labs in 3 months.  -Liver and gallbladder tests (ALT,AST, Alk phos,bilirubin) is very mildly elevated.  This could be from your diabetes and cholesterol being poorly controlled but it could be from alcohol or tylenol use.  Please avoid these substances and recheck in 3 months  -Kidney function (GFR) is normal.  -Sodium is normal.  -Potassium is normal.  -Calcium is normal.  -Glucose is elevated due to your diabetes.  -A1C test (average blood sugar the last 2-3 months) is above your goal.   ADVISE: working on diet/exercise and increasing your metformin as we discussed in the clinic.  Follow up in the clinic in 3 months for diabetes/cholesterol recheck.    Good luck with your surgery!    If you have any questions please do not hesitate to contact our office via phone ([phone]) or MyChart.    Jasmyn Giles, MS, PA-C  [facility] [place]

## 2020-05-10 ENCOUNTER — OFFICE VISIT (OUTPATIENT)
Dept: URGENT CARE | Facility: URGENT CARE | Age: 50
End: 2020-05-10
Payer: OTHER MISCELLANEOUS

## 2020-05-10 DIAGNOSIS — Z20.822 SUSPECTED COVID-19 VIRUS INFECTION: Primary | ICD-10-CM

## 2020-05-10 PROCEDURE — 87635 SARS-COV-2 COVID-19 AMP PRB: CPT | Mod: 90 | Performed by: FAMILY MEDICINE

## 2020-05-10 PROCEDURE — 99000 SPECIMEN HANDLING OFFICE-LAB: CPT | Performed by: FAMILY MEDICINE

## 2020-05-10 PROCEDURE — 99207 ZZC NO BILLABLE SERVICE THIS VISIT: CPT

## 2020-05-11 ENCOUNTER — ANESTHESIA EVENT (OUTPATIENT)
Dept: SURGERY | Facility: AMBULATORY SURGERY CENTER | Age: 50
End: 2020-05-11

## 2020-05-11 LAB
SARS-COV-2 RNA SPEC QL NAA+PROBE: NOT DETECTED
SPECIMEN SOURCE: NORMAL

## 2020-05-12 ENCOUNTER — ANCILLARY PROCEDURE (OUTPATIENT)
Dept: ULTRASOUND IMAGING | Facility: CLINIC | Age: 50
End: 2020-05-12
Payer: OTHER MISCELLANEOUS

## 2020-05-12 ENCOUNTER — ANESTHESIA (OUTPATIENT)
Dept: SURGERY | Facility: AMBULATORY SURGERY CENTER | Age: 50
End: 2020-05-12

## 2020-05-12 ENCOUNTER — TELEPHONE (OUTPATIENT)
Dept: ORTHOPEDICS | Facility: CLINIC | Age: 50
End: 2020-05-12

## 2020-05-12 ENCOUNTER — HOSPITAL ENCOUNTER (OUTPATIENT)
Facility: AMBULATORY SURGERY CENTER | Age: 50
End: 2020-05-12
Attending: ORTHOPAEDIC SURGERY
Payer: OTHER MISCELLANEOUS

## 2020-05-12 VITALS
OXYGEN SATURATION: 94 % | HEIGHT: 72 IN | BODY MASS INDEX: 38.19 KG/M2 | RESPIRATION RATE: 14 BRPM | HEART RATE: 78 BPM | TEMPERATURE: 97.1 F | DIASTOLIC BLOOD PRESSURE: 67 MMHG | SYSTOLIC BLOOD PRESSURE: 105 MMHG | WEIGHT: 282 LBS

## 2020-05-12 DIAGNOSIS — Z98.890 S/P ARTHROSCOPY OF LEFT SHOULDER: Primary | ICD-10-CM

## 2020-05-12 DIAGNOSIS — S46.812A: ICD-10-CM

## 2020-05-12 DIAGNOSIS — S46.812A: Primary | ICD-10-CM

## 2020-05-12 LAB — GLUCOSE BLDC GLUCOMTR-MCNC: 163 MG/DL (ref 70–99)

## 2020-05-12 DEVICE — IMPLANTABLE DEVICE: Type: IMPLANTABLE DEVICE | Site: SHOULDER | Status: FUNCTIONAL

## 2020-05-12 DEVICE — IMP ANCHOR ARTHREX 4.5MM PEEK CORKSCREW AR-1927PSF-45: Type: IMPLANTABLE DEVICE | Site: SHOULDER | Status: FUNCTIONAL

## 2020-05-12 DEVICE — IMP ANCHOR ARTHREX BIO-SWIVELOCK 4.75X22MM AR-2324BCC-2: Type: IMPLANTABLE DEVICE | Site: SHOULDER | Status: FUNCTIONAL

## 2020-05-12 RX ORDER — GABAPENTIN 300 MG/1
300 CAPSULE ORAL 3 TIMES DAILY
Qty: 9 CAPSULE | Refills: 0 | Status: SHIPPED | OUTPATIENT
Start: 2020-05-12 | End: 2021-01-08

## 2020-05-12 RX ORDER — KETOROLAC TROMETHAMINE 10 MG/1
TABLET, FILM COATED ORAL
Qty: 13 TABLET | Refills: 0 | Status: SHIPPED | OUTPATIENT
Start: 2020-05-12 | End: 2020-06-23

## 2020-05-12 RX ORDER — NALOXONE HYDROCHLORIDE 0.4 MG/ML
.1-.4 INJECTION, SOLUTION INTRAMUSCULAR; INTRAVENOUS; SUBCUTANEOUS
Status: DISCONTINUED | OUTPATIENT
Start: 2020-05-12 | End: 2020-05-12 | Stop reason: HOSPADM

## 2020-05-12 RX ORDER — OXYCODONE HYDROCHLORIDE 5 MG/1
5 TABLET ORAL EVERY 4 HOURS PRN
Qty: 20 TABLET | Refills: 0 | Status: SHIPPED | OUTPATIENT
Start: 2020-05-12 | End: 2020-06-23

## 2020-05-12 RX ORDER — IBUPROFEN 200 MG
400 TABLET ORAL
Status: COMPLETED | OUTPATIENT
Start: 2020-05-12 | End: 2020-05-12

## 2020-05-12 RX ORDER — CEFAZOLIN SODIUM 1 G/50ML
1 SOLUTION INTRAVENOUS SEE ADMIN INSTRUCTIONS
Status: DISCONTINUED | OUTPATIENT
Start: 2020-05-12 | End: 2020-05-12 | Stop reason: HOSPADM

## 2020-05-12 RX ORDER — CEFAZOLIN SODIUM IN 0.9 % NACL 3 G/100 ML
3 INTRAVENOUS SOLUTION, PIGGYBACK (ML) INTRAVENOUS
Status: DISCONTINUED | OUTPATIENT
Start: 2020-05-12 | End: 2020-05-12 | Stop reason: HOSPADM

## 2020-05-12 RX ORDER — ONDANSETRON 2 MG/ML
4 INJECTION INTRAMUSCULAR; INTRAVENOUS EVERY 30 MIN PRN
Status: DISCONTINUED | OUTPATIENT
Start: 2020-05-12 | End: 2020-05-13 | Stop reason: HOSPADM

## 2020-05-12 RX ORDER — PROPOFOL 10 MG/ML
INJECTION, EMULSION INTRAVENOUS CONTINUOUS PRN
Status: DISCONTINUED | OUTPATIENT
Start: 2020-05-12 | End: 2020-05-12

## 2020-05-12 RX ORDER — SENNOSIDES 8.6 MG
CAPSULE ORAL
Qty: 100 TABLET | Refills: 0 | Status: SHIPPED | OUTPATIENT
Start: 2020-05-12 | End: 2020-06-23

## 2020-05-12 RX ORDER — KETAMINE HYDROCHLORIDE 10 MG/ML
INJECTION, SOLUTION INTRAMUSCULAR; INTRAVENOUS PRN
Status: DISCONTINUED | OUTPATIENT
Start: 2020-05-12 | End: 2020-05-12

## 2020-05-12 RX ORDER — KETOROLAC TROMETHAMINE 30 MG/ML
INJECTION, SOLUTION INTRAMUSCULAR; INTRAVENOUS PRN
Status: DISCONTINUED | OUTPATIENT
Start: 2020-05-12 | End: 2020-05-12

## 2020-05-12 RX ORDER — METOCLOPRAMIDE 5 MG/1
5 TABLET ORAL EVERY 8 HOURS PRN
Qty: 10 TABLET | Refills: 0 | Status: SHIPPED | OUTPATIENT
Start: 2020-05-12 | End: 2020-06-23

## 2020-05-12 RX ORDER — ONDANSETRON 4 MG/1
4 TABLET, ORALLY DISINTEGRATING ORAL EVERY 30 MIN PRN
Status: DISCONTINUED | OUTPATIENT
Start: 2020-05-12 | End: 2020-05-13 | Stop reason: HOSPADM

## 2020-05-12 RX ORDER — ACETAMINOPHEN 325 MG/1
975 TABLET ORAL ONCE
Status: COMPLETED | OUTPATIENT
Start: 2020-05-12 | End: 2020-05-12

## 2020-05-12 RX ORDER — ACETAMINOPHEN 325 MG/1
975 TABLET ORAL ONCE
Status: DISCONTINUED | OUTPATIENT
Start: 2020-05-12 | End: 2020-05-12 | Stop reason: HOSPADM

## 2020-05-12 RX ORDER — ONDANSETRON 2 MG/ML
INJECTION INTRAMUSCULAR; INTRAVENOUS PRN
Status: DISCONTINUED | OUTPATIENT
Start: 2020-05-12 | End: 2020-05-12

## 2020-05-12 RX ORDER — DEXAMETHASONE SODIUM PHOSPHATE 4 MG/ML
INJECTION, SOLUTION INTRA-ARTICULAR; INTRALESIONAL; INTRAMUSCULAR; INTRAVENOUS; SOFT TISSUE PRN
Status: DISCONTINUED | OUTPATIENT
Start: 2020-05-12 | End: 2020-05-12

## 2020-05-12 RX ORDER — CEFAZOLIN SODIUM 1 G/3ML
INJECTION, POWDER, FOR SOLUTION INTRAMUSCULAR; INTRAVENOUS PRN
Status: DISCONTINUED | OUTPATIENT
Start: 2020-05-12 | End: 2020-05-12

## 2020-05-12 RX ORDER — ONDANSETRON 4 MG/1
4 TABLET, ORALLY DISINTEGRATING ORAL
Status: DISCONTINUED | OUTPATIENT
Start: 2020-05-12 | End: 2020-05-13 | Stop reason: HOSPADM

## 2020-05-12 RX ORDER — OXYCODONE HYDROCHLORIDE 5 MG/1
5 TABLET ORAL EVERY 4 HOURS PRN
Status: DISCONTINUED | OUTPATIENT
Start: 2020-05-12 | End: 2020-05-13 | Stop reason: HOSPADM

## 2020-05-12 RX ORDER — GLYCOPYRROLATE 0.2 MG/ML
INJECTION, SOLUTION INTRAMUSCULAR; INTRAVENOUS PRN
Status: DISCONTINUED | OUTPATIENT
Start: 2020-05-12 | End: 2020-05-12

## 2020-05-12 RX ORDER — SODIUM CHLORIDE, SODIUM LACTATE, POTASSIUM CHLORIDE, CALCIUM CHLORIDE 600; 310; 30; 20 MG/100ML; MG/100ML; MG/100ML; MG/100ML
INJECTION, SOLUTION INTRAVENOUS CONTINUOUS
Status: DISCONTINUED | OUTPATIENT
Start: 2020-05-12 | End: 2020-05-13 | Stop reason: HOSPADM

## 2020-05-12 RX ORDER — GABAPENTIN 300 MG/1
600 CAPSULE ORAL 3 TIMES DAILY
Status: DISCONTINUED | OUTPATIENT
Start: 2020-05-12 | End: 2020-05-12 | Stop reason: HOSPADM

## 2020-05-12 RX ORDER — HYDRALAZINE HYDROCHLORIDE 20 MG/ML
INJECTION INTRAMUSCULAR; INTRAVENOUS PRN
Status: DISCONTINUED | OUTPATIENT
Start: 2020-05-12 | End: 2020-05-12

## 2020-05-12 RX ORDER — SENNOSIDES 8.6 MG
2 TABLET ORAL 2 TIMES DAILY PRN
Qty: 30 TABLET | Refills: 0 | Status: SHIPPED | OUTPATIENT
Start: 2020-05-12 | End: 2020-06-23

## 2020-05-12 RX ORDER — LABETALOL 20 MG/4 ML (5 MG/ML) INTRAVENOUS SYRINGE
PRN
Status: DISCONTINUED | OUTPATIENT
Start: 2020-05-12 | End: 2020-05-12

## 2020-05-12 RX ORDER — OXYCODONE HYDROCHLORIDE 5 MG/1
5 TABLET ORAL
Status: DISCONTINUED | OUTPATIENT
Start: 2020-05-12 | End: 2020-05-13 | Stop reason: HOSPADM

## 2020-05-12 RX ORDER — SODIUM CHLORIDE, SODIUM LACTATE, POTASSIUM CHLORIDE, CALCIUM CHLORIDE 600; 310; 30; 20 MG/100ML; MG/100ML; MG/100ML; MG/100ML
INJECTION, SOLUTION INTRAVENOUS CONTINUOUS
Status: DISCONTINUED | OUTPATIENT
Start: 2020-05-12 | End: 2020-05-12 | Stop reason: HOSPADM

## 2020-05-12 RX ORDER — FENTANYL CITRATE 50 UG/ML
25-50 INJECTION, SOLUTION INTRAMUSCULAR; INTRAVENOUS
Status: DISCONTINUED | OUTPATIENT
Start: 2020-05-12 | End: 2020-05-12 | Stop reason: HOSPADM

## 2020-05-12 RX ORDER — BUPIVACAINE HYDROCHLORIDE 5 MG/ML
INJECTION, SOLUTION EPIDURAL; INTRACAUDAL PRN
Status: DISCONTINUED | OUTPATIENT
Start: 2020-05-12 | End: 2020-05-12

## 2020-05-12 RX ORDER — LIDOCAINE 40 MG/G
CREAM TOPICAL
Status: DISCONTINUED | OUTPATIENT
Start: 2020-05-12 | End: 2020-05-12 | Stop reason: HOSPADM

## 2020-05-12 RX ORDER — ACETAMINOPHEN 325 MG/1
650 TABLET ORAL
Status: DISCONTINUED | OUTPATIENT
Start: 2020-05-12 | End: 2020-05-13 | Stop reason: HOSPADM

## 2020-05-12 RX ORDER — PROPOFOL 10 MG/ML
INJECTION, EMULSION INTRAVENOUS PRN
Status: DISCONTINUED | OUTPATIENT
Start: 2020-05-12 | End: 2020-05-12

## 2020-05-12 RX ORDER — NALOXONE HYDROCHLORIDE 0.4 MG/ML
.1-.4 INJECTION, SOLUTION INTRAMUSCULAR; INTRAVENOUS; SUBCUTANEOUS
Status: DISCONTINUED | OUTPATIENT
Start: 2020-05-12 | End: 2020-05-13 | Stop reason: HOSPADM

## 2020-05-12 RX ORDER — MEPERIDINE HYDROCHLORIDE 25 MG/ML
12.5 INJECTION INTRAMUSCULAR; INTRAVENOUS; SUBCUTANEOUS
Status: DISCONTINUED | OUTPATIENT
Start: 2020-05-12 | End: 2020-05-13 | Stop reason: HOSPADM

## 2020-05-12 RX ORDER — FENTANYL CITRATE 50 UG/ML
INJECTION, SOLUTION INTRAMUSCULAR; INTRAVENOUS PRN
Status: DISCONTINUED | OUTPATIENT
Start: 2020-05-12 | End: 2020-05-12

## 2020-05-12 RX ORDER — OMEPRAZOLE 40 MG/1
40 CAPSULE, DELAYED RELEASE ORAL DAILY
Qty: 7 CAPSULE | Refills: 0 | Status: SHIPPED | OUTPATIENT
Start: 2020-05-12 | End: 2020-06-23

## 2020-05-12 RX ORDER — ASPIRIN 81 MG
100 TABLET, DELAYED RELEASE (ENTERIC COATED) ORAL 2 TIMES DAILY PRN
Qty: 30 TABLET | Refills: 0 | Status: SHIPPED | OUTPATIENT
Start: 2020-05-12 | End: 2020-06-23

## 2020-05-12 RX ORDER — GABAPENTIN 300 MG/1
300 CAPSULE ORAL ONCE
Status: COMPLETED | OUTPATIENT
Start: 2020-05-12 | End: 2020-05-12

## 2020-05-12 RX ORDER — FLUMAZENIL 0.1 MG/ML
0.2 INJECTION, SOLUTION INTRAVENOUS
Status: DISCONTINUED | OUTPATIENT
Start: 2020-05-12 | End: 2020-05-12 | Stop reason: HOSPADM

## 2020-05-12 RX ADMIN — CEFAZOLIN SODIUM 3 G: 1 INJECTION, POWDER, FOR SOLUTION INTRAMUSCULAR; INTRAVENOUS at 08:20

## 2020-05-12 RX ADMIN — Medication 400 MG: at 06:57

## 2020-05-12 RX ADMIN — DEXAMETHASONE SODIUM PHOSPHATE 4 MG: 4 INJECTION, SOLUTION INTRA-ARTICULAR; INTRALESIONAL; INTRAMUSCULAR; INTRAVENOUS; SOFT TISSUE at 08:40

## 2020-05-12 RX ADMIN — PROPOFOL 100 MCG/KG/MIN: 10 INJECTION, EMULSION INTRAVENOUS at 08:12

## 2020-05-12 RX ADMIN — GLYCOPYRROLATE 0.2 MG: 0.2 INJECTION, SOLUTION INTRAMUSCULAR; INTRAVENOUS at 08:15

## 2020-05-12 RX ADMIN — ACETAMINOPHEN 975 MG: 325 TABLET ORAL at 06:57

## 2020-05-12 RX ADMIN — CEFAZOLIN SODIUM 1 G: 1 INJECTION, POWDER, FOR SOLUTION INTRAMUSCULAR; INTRAVENOUS at 10:20

## 2020-05-12 RX ADMIN — LABETALOL 20 MG/4 ML (5 MG/ML) INTRAVENOUS SYRINGE 10 MG: at 09:52

## 2020-05-12 RX ADMIN — FENTANYL CITRATE 50 MCG: 50 INJECTION, SOLUTION INTRAMUSCULAR; INTRAVENOUS at 10:40

## 2020-05-12 RX ADMIN — HYDRALAZINE HYDROCHLORIDE 5 MG: 20 INJECTION INTRAMUSCULAR; INTRAVENOUS at 10:06

## 2020-05-12 RX ADMIN — GABAPENTIN 300 MG: 300 CAPSULE ORAL at 06:57

## 2020-05-12 RX ADMIN — KETAMINE HYDROCHLORIDE 20 MG: 10 INJECTION, SOLUTION INTRAMUSCULAR; INTRAVENOUS at 08:23

## 2020-05-12 RX ADMIN — KETOROLAC TROMETHAMINE 30 MG: 30 INJECTION, SOLUTION INTRAMUSCULAR; INTRAVENOUS at 11:12

## 2020-05-12 RX ADMIN — BUPIVACAINE HYDROCHLORIDE 10 ML: 5 INJECTION, SOLUTION EPIDURAL; INTRACAUDAL at 07:44

## 2020-05-12 RX ADMIN — KETAMINE HYDROCHLORIDE 10 MG: 10 INJECTION, SOLUTION INTRAMUSCULAR; INTRAVENOUS at 09:23

## 2020-05-12 RX ADMIN — HYDRALAZINE HYDROCHLORIDE 5 MG: 20 INJECTION INTRAMUSCULAR; INTRAVENOUS at 10:24

## 2020-05-12 RX ADMIN — KETAMINE HYDROCHLORIDE 10 MG: 10 INJECTION, SOLUTION INTRAMUSCULAR; INTRAVENOUS at 10:23

## 2020-05-12 RX ADMIN — LABETALOL 20 MG/4 ML (5 MG/ML) INTRAVENOUS SYRINGE 10 MG: at 09:45

## 2020-05-12 RX ADMIN — SODIUM CHLORIDE, SODIUM LACTATE, POTASSIUM CHLORIDE, CALCIUM CHLORIDE: 600; 310; 30; 20 INJECTION, SOLUTION INTRAVENOUS at 08:02

## 2020-05-12 RX ADMIN — ONDANSETRON 4 MG: 2 INJECTION INTRAMUSCULAR; INTRAVENOUS at 08:20

## 2020-05-12 RX ADMIN — SODIUM CHLORIDE, SODIUM LACTATE, POTASSIUM CHLORIDE, CALCIUM CHLORIDE: 600; 310; 30; 20 INJECTION, SOLUTION INTRAVENOUS at 06:59

## 2020-05-12 RX ADMIN — PROPOFOL 50 MG: 10 INJECTION, EMULSION INTRAVENOUS at 08:36

## 2020-05-12 RX ADMIN — FENTANYL CITRATE 25 MCG: 50 INJECTION, SOLUTION INTRAMUSCULAR; INTRAVENOUS at 10:31

## 2020-05-12 RX ADMIN — FENTANYL CITRATE 25 MCG: 50 INJECTION, SOLUTION INTRAMUSCULAR; INTRAVENOUS at 10:22

## 2020-05-12 ASSESSMENT — MIFFLIN-ST. JEOR: SCORE: 2167.27

## 2020-05-12 NOTE — BRIEF OP NOTE
[facility] Brief Operative Note    Pre-operative diagnosis: Traumatic tear of supraspinatus, infraspinatus, and subscapularis tendons of the left shoulder [S46.195Z]   Post-operative diagnosis Same   Procedure: Procedure(s):  LEFT SHOULDER ARTHROSCOPIC ROTATOR CUFF REPAIR, BICEPS TENODESIS, SUBACROMIAL DECOMPRESSION,  DEBRIDEMENT   Surgeon(s): Surgeon(s) and Role:     * David Boyd MD - Primary   Estimated blood loss: 5 cc   Specimens: * No specimens in log *   Findings: Full thickness tear of the supraspinatus and infraspinatus; high grade partial thickness tear of the upper subscapularis tendon, Type II SLAP tear, extensive synovitis and subacromial bursitis

## 2020-05-12 NOTE — DISCHARGE INSTRUCTIONS
"[facility]  Home Care Following Anesthesia  For 24 hours after surgery:  1. Get plenty of rest.  A responsible adult must stay with you for at least 24 hours after you leave the surgery center.  2. Do not drive or use heavy equipment.  If you have weakness or tingling, don't drive or use heavy equipment until this feeling goes away.   3. Do not drink alcohol.   4. Avoid strenuous or risky activities.  Ask for help when climbing stairs.  5. You may feel lightheaded.  IF so, sit for a few minutes before standing.  Have someone help you get up.   6. If you have nausea (feel sick to your stomach): Drink only clear liquids such as apple juice, ginger ale, broth or 7-Up.  Rest may also help.  Be sure to drink enough fluids.  Move to a regular diet as you feel able.   7. You may have a slight fever.  Call the doctor if your fever is over 100 F (37.7 C) (taken under the tongue) or lasts longer than 24 hours.  8. You may have a dry mouth, a sore throat, muscle aches or trouble sleeping. These should go away after 24 hours.  9. Do not make important or legal decisions.        Today you received an Exparel block to numb the nerves near your surgery site.  This is a block using local anesthetic or \"numbing\" medication injected around the nerves to anesthetize or \"numb\" the area supplied by those nerves.  This block is injected into the muscle layer near your surgical site.  This medication may numb the location where you had surgery up to 72 hours.  If your surgical site is an arm or leg you should be careful with your affected limb, since it is possible to injure your limb without being aware of it due to the numbing.  Until full feeling returns, you should guard against bumping or hitting your limb, and avoid extreme hot or cold temperatures on the skin.  As the block wears off, the feeling will return as a tingling or prickly sensation near your surgical site.  You will experince more " discomfort from your incision as the feeling returns.  You may want to take a pain pill (a narcotic or Tylenol if this was prescribed by your surgeon) when you start to experience mild pain before the pain beomes more severe.  If your pain medications do not control your pain, you should notify your surgeon.    Tips for taking pain medications  To get the best pain relief possible, remember these points:    Take pain medications as directed, before pain becomes severe.    Pain medication can upset your stomach: taking it with food may help.    Constipation is a common side effect of pain medication. Drink plenty of  fluids.    Eat foods high in fiber. Take a stool softener if recommended by your doctor or pharmacist.    Do not drink alcohol, drive or operate machinery while taking pain medications.    Ask about other ways to control pain, such as with heat, ice or relaxation.    Tylenol/Acetaminophen Consumption  To help encourage the safe use of acetaminophen, the makers of TYLENOL  have lowered the maximum daily dose for single-ingredient Extra Strength TYLENOL  (acetaminophen) products sold in the [place] from 8 pills per day (4,000 mg) to 6 pills per day (3,000 mg). The dosing interval has also changed from 2 pills every 4-6 hours to 2 pills every 6 hours.    If you feel your pain relief is insufficient, you may take Tylenol/Acetaminophen in addition to your narcotic pain medication.     Be careful not to exceed 3,000 mg of Tylenol/Acetaminophen in a 24 hour period from all sources.    If you are taking extra strength Tylenol/acetaminophen (500 mg), the maximum dose is 6 tablets in 24 hours.    If you are taking regular strength acetaminophen (325 mg), the maximum dose is 9 tablets in 24 hours.    Call a doctor for any of the followin. Signs of infection (fever, growing tenderness at the surgery site, a large amount of drainage or bleeding, severe pain, foul-smelling drainage, redness, swelling).  2. It has  been over 8 to 10 hours since surgery and you are still not able to urinate (pass water).  3. Headache for over 24 hours.  4. Numbness, tingling or weakness the day after surgery (if you had spinal anesthesia).  5. Signs of Covid-19 infection (temperature over 100 degrees, shortness of breath, cough, loss of taste/smell, generalized body aches, persistent headache, chills, sore throat, nausea/vomiting/diarrhea)  Your doctor is:  Dr. David Boyd, Orthopaedics: [phone]                    Or dial [phone] and ask for the resident on call for:  Orthopaedics  For emergency care, call the:  [facility] [place] Emergency Department: [phone] (TTY for hearing impaired: [phone])    *You were given 975 mg Tylenol at 7:00, you may take an additional 650 mg Tylenol any time after 1 p.m. today

## 2020-05-12 NOTE — TELEPHONE ENCOUNTER
"Returned call to patient's wife Lucy.   She states she has questions regarding patient's surgery.   After reading his discharge paperwork she noticed that on his paperwork it states that he may have pain relief for up to 72 hours, but it referenced his abdomen. She is confused why the discharge paperwork references providing anesthesia to his abdomen.     Per chart review, patient received an interscalene block which is in the shoulder region.   He may have nausea, and loss of appetite due to anesthesia, but there is no block that was performed on his abdomen.     Lucy also has questions as to when to start medication regimen. She noted that staff told her to start Tylenol as soon as she got home, but was not given and further instruction as to when to start the Torodol, Gabapentin, or \"tummy pill\".  I told her that within her packet there should be a grid like document to help guide when medications should be provided to Nick, she said that it doesn't tell her what she needs to know about what time to start.  Patient was also not discharged with ice pack, they do have ice packs at home for use.     Due to procedure performed at [facility], Unable to see discharge paperwork that was provided to patient.  Please advise/ reach out to Lucy. She appeared to be quite frazzled on the phone.       Dena Mayer, ATC  "

## 2020-05-12 NOTE — ANESTHESIA PREPROCEDURE EVALUATION
"Anesthesia Pre-Procedure Evaluation    Patient: Thomas Pierce   MRN:     [id number] Gender:   male   Age:    50 year old :      1970        Preoperative Diagnosis: Traumatic tear of supraspinatus tendon of left shoulder [S46.812A]   Procedure(s):  LEFT SHOULDER ARTHROSCOPIC ROTATOR CUFF REPAIR, BICEPS TENODESIS, SUBACROMIAL DECOMPRESSION, POSSIBLE DEBRIDEMENT     LABS:  CBC:   Lab Results   Component Value Date    WBC 8.3 2020    WBC 8.0 10/20/2011    HGB 16.1 2020    HGB 16.6 10/20/2011    HCT 44.7 2020    HCT 46.3 10/20/2011     2020     10/20/2011     BMP:   Lab Results   Component Value Date     2020     2019    POTASSIUM 3.6 2020    POTASSIUM 3.7 2019    CHLORIDE 102 2020    CHLORIDE 104 2019    CO2 25 2020    CO2 22 2019    BUN 16 2020    BUN 13 2019    CR 0.83 2020    CR 0.80 2019     (H) 2020     (H) 2019     COAGS: No results found for: PTT, INR, FIBR  POC:   Lab Results   Component Value Date     (H) 2020     OTHER:   Lab Results   Component Value Date    A1C 8.3 (H) 2020    KATLYN 9.3 2020    ALBUMIN 3.9 2020    PROTTOTAL 8.1 2020    ALT 88 (H) 2020    AST 42 2020    ALKPHOS 69 2020    BILITOTAL 0.6 2020    TSH 2.20 10/16/2018        Preop Vitals    BP Readings from Last 3 Encounters:   20 (!) 141/95   20 134/89   20 122/82    Pulse Readings from Last 3 Encounters:   20 85   20 107   20 82      Resp Readings from Last 3 Encounters:   20 18   13 22   13 20    SpO2 Readings from Last 3 Encounters:   20 96%   20 95%   20 96%      Temp Readings from Last 1 Encounters:   20 36.4  C (97.6  F) (Oral)    Ht Readings from Last 1 Encounters:   20 1.829 m (6' 0.01\")      Wt Readings from Last 1 Encounters: " "  05/12/20 127.9 kg (282 lb)    Estimated body mass index is 38.24 kg/m  as calculated from the following:    Height as of this encounter: 1.829 m (6' 0.01\").    Weight as of this encounter: 127.9 kg (282 lb).     LDA:  Peripheral IV 05/12/20 Right Hand (Active)   Site Assessment WDL 05/12/20 0650   Line Status Infusing 05/12/20 0650   Phlebitis Scale 0-->no symptoms 05/12/20 0650   Infiltration Scale 0 05/12/20 0650   Infiltration Site Treatment Method  None 05/12/20 0650   Extravasation? No 05/12/20 0650   Dressing Intervention New dressing  05/12/20 0650   Number of days: 0        Past Medical History:   Diagnosis Date     Hyperlipidemia LDL goal <160 10/20/2011     Hypertension goal BP (blood pressure) < 140/90 10/20/2011     Prediabetes       Past Surgical History:   Procedure Laterality Date     MANDIBLE SURGERY  1978    trauma while sledding      No Known Allergies     Anesthesia Evaluation     .             ROS/MED HX    ENT/Pulmonary:  - neg pulmonary ROS   (+)JOSE risk factors , . .    Neurologic:  - neg neurologic ROS     Cardiovascular:     (+) Dyslipidemia, hypertension----. : . . . :. .       METS/Exercise Tolerance:     Hematologic:         Musculoskeletal:         GI/Hepatic:         Renal/Genitourinary:         Endo:     (+) type II DM Obesity, .      Psychiatric:  - neg psychiatric ROS       Infectious Disease:         Malignancy:         Other:                         PHYSICAL EXAM:   Mental Status/Neuro: A/A/O   Airway: Facies: Feasible  Mallampati: I  Mouth/Opening: Full  TM distance: > 6 cm  Neck ROM: Full   Respiratory: Auscultation: CTAB     Resp. Rate: Normal     Resp. Effort: Normal      CV: Rhythm: Regular  Rate: Age appropriate  Heart: Normal Sounds  Edema: None   Comments:      Dental: Normal Dentition                Assessment:   ASA SCORE: 2    H&P: History and physical reviewed and following examination; no interval change.   Smoking Status:  Non-Smoker/Unknown   NPO Status: NPO " Appropriate     Plan:   Anes. Type:  MAC; Peripheral Nerve Block; For Post-op pain in coordination with surgeon     Block Details: Single Shot; Exparel; Interscalene   Pre-Medication: None   Induction:  IV (Standard)   Airway: Native Airway   Access/Monitoring: PIV   Maintenance: Propofol Sedation     Postop Plan:   Postop Pain: None  Postop Sedation/Airway: Not planned  Disposition: Outpatient     PONV Management:   Adult Risk Factors:, Non-Smoker   Prevention: Ondansetron, Propofol     CONSENT: Direct conversation   Plan and risks discussed with: Patient                      Della Ruvalcaba MD

## 2020-05-12 NOTE — ANESTHESIA POSTPROCEDURE EVALUATION
Anesthesia POST Procedure Evaluation    Patient: Thomas Pierce   MRN:     [id number] Gender:   male   Age:    50 year old :      1970        Preoperative Diagnosis: Traumatic tear of supraspinatus tendon of left shoulder [S46.812A]   Procedure(s):  LEFT SHOULDER ARTHROSCOPIC ROTATOR CUFF REPAIR, BICEPS TENODESIS, SUBACROMIAL DECOMPRESSION,  DEBRIDEMENT   Postop Comments: No value filed.     Anesthesia Type: MAC       Disposition: Outpatient   Postop Pain Control: Uneventful            Sign Out: Well controlled pain   PONV: No   Neuro/Psych: Uneventful            Sign Out: Acceptable/Baseline neuro status   Airway/Respiratory: Uneventful            Sign Out: Acceptable/Baseline resp. status   CV/Hemodynamics: Uneventful            Sign Out: Acceptable CV status   Other NRE: NONE   DID A NON-ROUTINE EVENT OCCUR? No         Last Anesthesia Record Vitals:  CRNA VITALS  2020 1106 - 2020 1206      2020             Resp Rate (set):  10          Last PACU Vitals:  Vitals Value Taken Time   /64 2020 12:00 PM   Temp 36.2  C (97.1  F) 2020 12:00 PM   Pulse 80 2020 12:00 PM   Resp 6 2020 12:03 PM   SpO2 92 % 2020 12:04 PM   Temp src     NIBP     Pulse     SpO2     Resp     Temp     Ht Rate     Temp 2     Vitals shown include unvalidated device data.      Electronically Signed By: Reji Barrientos MD, MD, May 12, 2020, 1:45 PM

## 2020-05-12 NOTE — ANESTHESIA PROCEDURE NOTES
Peripheral Nerve Block Procedure Note  Staff -   Anesthesiologist:  Reji Barrientos MD      Performed By: resident  Procedure performed by resident/CRNA in presence of a teaching physician.        Location: Pre-op  Procedure Start/Stop TImes:      5/12/2020 7:40 AM     5/12/2020 7:45 AM    patient identified, IV checked, site marked, risks and benefits discussed, informed consent, monitors and equipment checked, pre-op evaluation, at physician/surgeon's request and post-op pain management      Correct Patient: Yes      Correct Position: Yes      Correct Site: Yes      Correct Procedure: Yes      Correct Laterality:  Yes    Site Marked:  Yes  Procedure details:     Procedure:  Interscalene    ASA:  3    Laterality:  Left    Position:  Supine    Sterile Prep: chloraprep, mask and sterile gloves      Ultrasound: Yes      Ultrasound used to identify targeted nerve, plexus, or vascular structure and placed a needle adjacent to it      Permanent Image entered into patiient's record      Abnormal pain on injection: No      Blood Aspirated: No      Paresthesias:  No    Bleeding at site: No      Test dose negative for signs of intravascular injection: Yes      Bolus via:  Needle    Blood aspirated via catheter: No      Complications:  None  Assessment/Narrative:      133 mg of exparel given via interscalene block

## 2020-05-12 NOTE — TELEPHONE ENCOUNTER
Patient's wife calls with questions for Dr. Boyd. She requests a call back at [phone].  Kimberly Regan, ATC

## 2020-05-12 NOTE — OR NURSING
Pt received pre operatively left sided brachial plexus nerve block with exparel. Pt received 1 mg Versed IV. Pt tolerated procedure without immediate complication. VSS.

## 2020-05-13 NOTE — OP NOTE
Procedure Date: 05/12/2020      PREOPERATIVE DIAGNOSES:   1.  Left shoulder complete rotator cuff tear of the supraspinatus, infraspinatus, and upper subscapularis tendons.   2.  Left shoulder long head of the biceps tendinopathy.   3.  Left shoulder superior labral tearing.   4.  Left shoulder subacromial bursitis, extensive.      POSTOPERATIVE DIAGNOSES.   1.  Left shoulder complete rotator cuff tear of the supraspinatus, infraspinatus, and upper subscapularis tendons.   2.  Left shoulder long head of the biceps tendinopathy.   3.  Left shoulder superior labral tearing, type 2 SLAP tear.   4.  Left shoulder subacromial bursitis, extensive.      PROCEDURES PERFORMED:   1.  Left shoulder diagnostic arthroscopy.   2.  Left shoulder extensive arthroscopic debridement of the superior labrum, anterior labrum, posterior labrum, and extensive synovitis.   3.  Left shoulder arthroscopic rotator cuff repair of the subscapularis, supraspinatus, infraspinatus.   4.  Left shoulder arthroscopic release of adhesions between the rotator cuff and capsule.   5.  Left shoulder arthroscopic subacromial decompression without bony acromioplasty.   6.  Left shoulder arthroscopic biceps tenodesis.   7.  Left shoulder subacromial injection of Marcaine with epinephrine.      SURGEON:  David Boyd MD      ASSISTANT:  Abiel Vivas MD, PGY-4 orthopedic resident.      ANESTHESIA: Regional MAC with an Exparel interscalene nerve block    DVT PROPHYLAXIS: Bilateral lower extremity SCDs    EBL: 5 mL    MEDICATIONS:  2 grams of IV cefazolin.      IMPLANTS:  4 Arthrex 4.5 mm PEEK FT corkscrew anchors (1 for the subscapularis, 3 for the supraspinatus and infraspinatus medial row), 2 Arthrex 4.75 mm BioComposite SwiveLock anchors for the lateral row, 1 Arthrex biceps FiberTak.        FINDINGS:  Included a complete tear of the supraspinatus, infraspinatus, and upper border of the subscapularis with medialization of the long head of the biceps  tendon.  There was degenerative tearing of the anterior and posterior labrum and a type II SLAP tear with complete detachment of the biceps anchor from the superior glenoid tubercle.  No loose bodies identified within the axillary recess.  Humeral head and glenoid cartilage with minimal wear.  Extensive subacromial bursitis and intra-articular synovitis.  The biceps tendon itself did demonstrate fraying and injection with inflammation.  There was no significant fraying of the coracoacromial ligament and there was a very small subacromial spur that did not necessitate bony acromioplasty.      INDICATIONS:  Thomas is a pleasant 50-year-old right-hand dominant male who had a slip and fall at work on 02/24/2020.  He sustained a left shoulder injury at that time.  This is a worker's compensation injury.  He was initially managed by my nonoperative partner, Dr. Albert Yeo.  He was managed with anti-inflammatory medications, activity modification, rest and physical therapy.  He failed to significantly improve.  An MRI was later obtained that demonstrated the above listed diagnoses.  He was sent to me for referral on 04/20/2020.  At that time, due to the COVID-19 pandemic, we were unable to offer him surgery.  As local/regional restrictions loosened, he returned to my clinic on 05/05/2020.  At that time, Thomas still had significant debilitating pain and pseudoparalysis of the left shoulder.  He was not getting any significant relief from his nonoperative management including icing, NSAIDs, PT, activity modification, and relative rest.  The risks, benefits and alternatives to the above listed procedures were discussed at length with the patient.  Risks of anesthesia including death, infection, nerve, tendon vessel injury, deep venous thrombosis, pulmonary embolism, shoulder stiffness, possible need to treat the biceps tendon including possible tenotomy versus tenodesis, rotator cuff repair nonhealing oral retear,  hardware-related problems, potential of procedure to not alleviate the condition, the potential need for further surgery in the future.  We also discussed the possible use of biologic augmentation of the collagen scaffold or allograft dermal skin graft depending on his tissue quality and tear size and intraoperative determination of healing potential.  It is important to note I also discussed with Thomas he is at a higher risk of complications and decreased overall outcomes due to the worker's compensation nature of his injury, his diabetes, and his Grade II/III teres minor atrophy on MRI.  He expressed understanding of these issues and elected to proceed.  Signed informed consent was obtained.  The patient was seen and cleared by his medical doctor prior to surgery and he also had a COVID-19 test that was negative prior to surgery.      DESCRIPTION OF PROCEDURE:  The patient was met on the day of the operation in the preoperative holding area.  His left shoulder was marked with his participation.  His informed consent was reviewed in detail and all questions were answered satisfactorily.  He underwent a regional anesthesia with an interscalene nerve block to the left shoulder performed by the Anesthesia Service in the preoperative holding area.  He was taken to the operating room theater by the Anesthesia Service and placed supine on the operating room table.  He was then placed in the beach chair position with all bony prominences well padded and the neck held in neutral position of flexion, extension and rotation.  The left upper extremity was then prepped and draped in standard sterile fashion.  A timeout was then held in accordance with hospital policy, confirming the correct patient, correct procedure, correct operative side and site.  We also confirmed the patient had on bilateral SCDs for DVT prophylaxis as well as receiving 3 grams of IV cefazolin.      All bony landmarks and portal sites were outlined  with a skin marker.  The portals and subacromial space were injected with Marcaine with epinephrine to improve hemostasis.  Standard anterior and posterior working portals were established.  Diagnostic arthroscopy proceeded with the findings as listed above.  Motorized shaver was used to debride the anterior labrum extensively, and then the posterior labrum extensively.  The superior labrum demonstrated a type II SLAP tear with complete detachment of the superior labrum from the supraglenoid tubercle and there was tearing and inflammation of the long head of the biceps tendon.  An anterolateral working portal was established and attention was turned to the anterior shoulder and bicipital groove.  The transverse humeral ligament was released and the biceps was freed from the groove.  The dissection was carried down to the level just superior to the pectoralis major tendon insertion.  Once the biceps tendon was freed, a perc incision was used to place a 1.9 mm drill hole followed by an Arthrex FiberTak into the bicipital groove just above the pec tendon level insertion.  The suture tapes were then passed through the biceps tendon in situ in a whipstitch configuration and then hand tensioned and tied securely pulling the biceps tendon into the groove and securing it to the bone.  The suture tails were then cut and the biceps tendon was confirmed with an arthroscopic probe to be securely fixated to the proximal humerus.  Arthroscopic ablator was then used to incise the biceps tendon 2 cm proximal to the area of repair and then the biceps stump was then debrided back to the superior labrum with an arthroscopic shaver.  This concluded the arthroscopic biceps tenodesis in situ.      Next, attention was turned to the upper border of the subscap tear.  The upper border of the subscapularis was dissected free from the rotator interval.  Rotator interval was excised back to the coracoid and strep tendons.  The upper border of  the subscapularis was then confirmed to be adequately able to be mobilized back to the lesser tuberosity.  Lesser tuberosity footprint was then cleared with the arthroscopic shaver and debridement down to a bleeding area of bone.  In a 4.5 mm PEEK FT corkscrew anchor was then placed into the lesser tuberosity through the anterior portal.  The sutures were then passed sequentially 4 times through the upper border of the subscapularis tendon with a 90-degree suture lasso.  Sutures were then hand tensioned and tied, reapproximating upper rolled border of the subscapularis tendon to the lesser tuberosity footprint and the sutures were cut.      The scope and trocar were then repositioned into the subacromial space and an extensive subacromial bursectomy was performed removing all adhesions and inflammatory bursa.  A large or massive crescent type tear of the supraspinatus and infraspinatus was identified.  It was retracted almost to the level of the glenoid rim.  Of note, the rotator cable was left intact with the upper rolled border of the subscapularis repair helping to anatomically reduce the supraspinatus.  Once the bursectomy was completed, the debridement of the greater tuberosity was performed to decorticate gently and abrade the bone to stimulate healing.  The subcortical bone was left intact for structural integrity while producing an excellent bleeding bed of tissue.  Percutaneous approach was used to place three 4.5 mm PEEK FT corkscrew anchors due to the impressively large area of exposed footprint.  The sutures from the anchors were then passed in horizontal mattress fashion through the supraspinatus and infraspinatus tendons sequentially and these were then tied compressing the medial aspect of the tendon to the medial footprint.  The non post-strands of the suture tails were then cut and the suture tails were then placed into 2 lateral SwiveLock anchors to compress the tendon against the footprint in a  double row fashion.  The final sutures were then cut.  The arm was taken through a range of motion to confirm continued excellent compression of the rotator cuff tendon against the footprint.  No loosening of the tendon was identified.  Probing of the tendon revealed excellent fixation to the tuberosity.  The shoulder was then suctioned dry.  Portals were closed with interrupted 4-0 nylon sutures.  Sterile dressings and a sling were applied.  The patient was taken to the recovery room in stable condition.  There were no complications.      POSTOPERATIVE PLAN:  The patient will be allowed to discharge to home today by same day surgery.  He will be in a sling with abduction pillow for 6 weeks following the operation.  He will have no lifting greater than 1 pound for 6 weeks.  We will initiate physical therapy at the 1-week postoperative jim to start  with some gentle passive range of motion exercises.  Active range of motion of the shoulder will initiate at 6 weeks, strengthening at 3 months.  The patient will return to clinic to see me at the 2-week, 6-week, 3-month, 6-month and 1-year intervals without radiographs.         PANCHITO CANO MD             D: 2020   T: 2020   MT: tavares      Name:     VALERIE HINES   MRN:      [id number]        Account:        [id number]   :      1970           Procedure Date: 2020      Document: [id number]

## 2020-05-13 NOTE — TELEPHONE ENCOUNTER
Dr. Boyd returned call to Lucy and answered her questions. She will call back to Nurse Triage with any further questions.     Dena Mayer ATC

## 2020-05-14 ENCOUNTER — TELEPHONE (OUTPATIENT)
Dept: ORTHOPEDICS | Facility: CLINIC | Age: 50
End: 2020-05-14

## 2020-05-14 NOTE — TELEPHONE ENCOUNTER
SURGEON POST-OP TELEPHONE CALL    DATE OF SURGERY: 5/12/2020    OPERATION PERFORMED: Left shoulder arthroscopic rotator cuff repair (supraspinatus, infraspinatus, and upper subscapularis), debridement, subacromial decompression, and arthroscopic biceps tenodesis.    I called Thomas today to check in on him and his progress. Ebotto states that he is doing well with no issues. Block has worn off and pain is controlled and is manageable with the medications provided. Randallchristina denies any fevers, chills, chest pain, shortness of breath, nausea or vomiting. No questions or concerns regarding any of the post-operative instructions at this time.     At the conclusion of the phone call, Thomas verbally acknowledged that I answered all of his questions satisfactorily.

## 2020-05-14 NOTE — TELEPHONE ENCOUNTER
Physical Therapy orders signed.   Protocol sent to Physical Therapist for reference.     Dena Mayer, ATC

## 2020-05-15 NOTE — TELEPHONE ENCOUNTER
[facility] Call Center    Phone Message    May a detailed message be left on voicemail: yes     Reason for Call: Other: pt calling in because he has some questions regarding his surger that he had done on 05/12/20. Please call the pt back to discuss as soon as possible. Thank You.      Action Taken: Message routed to:  [facility] ([facility] orthopedics    Travel Screening: Not Applicable

## 2020-05-15 NOTE — TELEPHONE ENCOUNTER
"Returned call to patient.   He states that last night he took 1 tab of Oxycodone before bed, and was able to sleep from 11pm- 4am. He usually wakes to use the restroom prior to surgery, but was happy with his sleep last night. Patient is sleeping in reclining chair, with arm in sling. He noted that this morning when he woke up he felt \"100x\" better, his pain has significantly decreased. He has mild discomfort in his upper arm rating 2/10. He states he did not take any Oxycodone this morning, but took the other medications prescribed by Dr. Boyd. He has been icing the shoulder, and working on making a fist, and finger range of motions. He has placed a small towel/ wash cloth in his sling due to some elbow irritation, but otherwise is doing well.      Patient wanted to touch base due to how quickly his pain has decreased.  Due to no trauma, or acute injury after surgery, and patient compliance with sling uses there was no concern for need of office visit.  Patient is scheduled for physical therapy on 5/18/20 with Usha Marquis, I informed him that once he starts Physical Therapy he may notice more soreness in the shoulder, but should be able to manage with ice, and current medication regimen.  Patient verbalized understanding, and will continue to monitor his shoulder, and reach out with any further concerns.     Dena Mayer, ATC     "

## 2020-05-18 ENCOUNTER — THERAPY VISIT (OUTPATIENT)
Dept: PHYSICAL THERAPY | Facility: CLINIC | Age: 50
End: 2020-05-18
Payer: OTHER MISCELLANEOUS

## 2020-05-18 DIAGNOSIS — S46.812A TRAUMATIC TEAR OF SUPRASPINATUS TENDON OF LEFT SHOULDER, INITIAL ENCOUNTER: ICD-10-CM

## 2020-05-18 DIAGNOSIS — G89.29 CHRONIC PAIN IN LEFT SHOULDER: ICD-10-CM

## 2020-05-18 DIAGNOSIS — M25.512 CHRONIC PAIN IN LEFT SHOULDER: ICD-10-CM

## 2020-05-18 DIAGNOSIS — Z47.89 AFTERCARE FOLLOWING SURGERY OF THE MUSCULOSKELETAL SYSTEM: ICD-10-CM

## 2020-05-18 PROCEDURE — 97162 PT EVAL MOD COMPLEX 30 MIN: CPT | Mod: GP | Performed by: PHYSICAL THERAPIST

## 2020-05-18 PROCEDURE — 97110 THERAPEUTIC EXERCISES: CPT | Mod: GP | Performed by: PHYSICAL THERAPIST

## 2020-05-18 NOTE — PROGRESS NOTES
"[facility] Initial Evaluation -- Upper Extremity    Evaluation Date: May 18, 2020  Thomas Pierce is a 50 year old male with a L shoulder condition.   Referral: Dr. Boyd  Work mechanical stresses:   Employment status:  Unable to work due to condition  Leisure mechanical stresses: n/a  Functional disability score (SPADI): see flow sheet  VAS score (0-10): 0/10 (took oxy last pm @ 11 pm); took 2 Tylenol 9 am  Handedness (R/L):  R  Patient goals/expectations:  To get back to work    HISTORY    Present symptoms: painfree at rest.    Pain quality (sharp/shooting/stabbing/aching/burning/cramping):  Sharp, aching    Present since (onset date):  May 12, 2020    Symptoms (improving/unchanging/worsening):  improving.    Symptoms commenced as a result of: fall at work   Condition occurred in the following environment: work    Symptoms at onset: L shoulder  Paresthesia (yes/no):  no  Spinal history: none   Cough/Sneeze (pos/neg):  neg    Constant symptoms:   Intermittent symptoms: L shoulder    Symptoms are worse with the following: Always Dressing, Always Reaching, Always Gripping and Sometimes Sleeping (prone/sup/side R/L) - has some increased pain with \"jerking\" in his sleep   Symptoms are better with the following: Always When still and Other - pain meds    Continued use makes the pain (better/worse/no effect): worse    Disturbed night (yes/no):  yes    Pain at rest (yes/no): no  Site (neck/shoulder/elbow/wrist/hand): L shoulder    Other questions (swelling/catching/clicking/locking/subluxing):  n/a    Previous episodes:   Previous treatments: PT without success    Specific Questions:  General health (excellent/good/fair/poor):  Answers for HPI/ROS submitted by the patient on 5/15/2020   History Reported by Patient  Reason for Visit:: PT  When problem began:: 2/24/2020  How problem occurred:: Slip and fall  Number scale: 2/10  General health as reported by patient: fair  Please " check all that apply to your current or past medical history: diabetes, high blood pressure, overweight  Medical allergies: none  Surgeries: orthopedic surgery  Medications you are currently taking: anti-inflammatory, high blood pressure medication, pain medication  Occupation::   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, prolonged sitting, pushing/pulling    Pertinent medical history includes: Diabetes, High blood pressure and Overweight  Medications (nil/NSAIDS/analg/steroids/anticoag/other):  NSAIDS, Narcotics/Opiods and Other - High blood pressure  Medical allergies:  none  Imaging (None/Xray/MRI/Other): MRI  Recent or major surgery (yes/no): yes, May 12, 2020  Night pain (yes/no): yes  Accidents (yes/no): yes  Unexplained weight loss (yes/no): none  Barriers at home: none  Other red flags: none    Sites for physical examination (neck/shoulder/elbow/wrist/hand): L shoulder    EXAMINATION    Posture:  Sitting (good/fair/poor): fair  Correction of posture (better/worse/no effect/NA): fair  Standing (good/fair/poor):n/a  Other observations:  n/a    Neurological (NA/motor/sensory/reflexes/dural): n/a    Baselines (pain or functional activity): n/a    Extremities (Shoulder/Elbow/Wrist/Hand): L shoulder    Movement Loss (PROM) [place] Mod Min Nil Pain   Flexion        Extension        Abduction        Internal Rotation        External Rotation        Supination        Pronation        Radial Deviation        Ulnar Deviation           Passive Movement (+/- overpressure)/(PDM/ERP):  erp w/passive movement, limited by pain  Resisted Test Response (pain): n/a  Other Tests: none    Spine:  Movement Loss:   Effect of repeated movements:   Effect of static positioning:   Spine testing (not relevant/relevant/secondary problem):     Baseline Symptoms:   Repeated Tests Symptom Response Mechanical Response   Active/Passive movement, resisted test, functional test During - Produce, Abolish, Increase, Decrease, NE After -    Better, Worse, NB, NW, NE Effect -   ? or ? ROM, strength or key functional test No Effect                               Effect of static positioning                  Provisional Classification (Extremity/Spine): Extremity - Inconclusive/Other - Post-Surgery      Principle of Management:  Education:  Protocol, stages of healing, use of ice for pain control, wearing sling (help w/donning correctly), therapeutic dose of exercise, traffic light  Equipment provided:    Exercise and dosage:  See flow sheet    ASSESSMENT/PLAN:    Patient is a 50 year old male with left side shoulder complaints.    Patient has the following significant findings with corresponding treatment plan.                Diagnosis 1:  S/p L RCR  Pain -  hot/cold therapy, manual therapy, splint/taping/bracing/orthotics, self management, education, directional preference exercise and home program  Decreased ROM/flexibility - manual therapy and therapeutic exercise  Decreased joint mobility - manual therapy and therapeutic exercise  Decreased strength - therapeutic exercise and therapeutic activities  Impaired muscle performance - neuro re-education  Decreased function - therapeutic activities    Therapy Evaluation Codes:   1) History comprised of:   Personal factors that impact the plan of care:      None.    Comorbidity factors that impact the plan of care are:      Diabetes, High blood pressure and Overweight.     Medications impacting care: Anti-inflammatory, High blood pressure and Pain.  2) Examination of Body Systems comprised of:   Body structures and functions that impact the plan of care:      Shoulder.   Activity limitations that impact the plan of care are:      Bathing, Cooking, Dressing, Lifting, Throwing, Working and Sleeping.  3) Clinical presentation characteristics are:   Evolving/Changing.  4) Decision-Making    Moderate complexity using standardized patient assessment instrument and/or measureable assessment of functional  outcome.  Cumulative Therapy Evaluation is: Moderate complexity.    Previous and current functional limitations:  (See Goal Flow Sheet for this information)    Short term and Long term goals: (See Goal Flow Sheet for this information)     Communication ability:  Patient appears to be able to clearly communicate and understand verbal and written communication and follow directions correctly.  Treatment Explanation - The following has been discussed with the patient:   RX ordered/plan of care  Anticipated outcomes  Possible risks and side effects  This patient would benefit from PT intervention to resume normal activities.   Rehab potential is good.    Frequency:  2 X week, once daily  Duration:  for 6 weeks, tapering to 1 x week for 6 weeks.  Discharge Plan:  Achieve all LTG.  Independent in home treatment program.  Reach maximal therapeutic benefit.    Please refer to the daily flowsheet for treatment today, total treatment time and time spent performing 1:1 timed codes.

## 2020-05-19 PROBLEM — Z47.89 AFTERCARE FOLLOWING SURGERY OF THE MUSCULOSKELETAL SYSTEM: Status: ACTIVE | Noted: 2020-05-19

## 2020-05-22 ENCOUNTER — THERAPY VISIT (OUTPATIENT)
Dept: PHYSICAL THERAPY | Facility: CLINIC | Age: 50
End: 2020-05-22
Payer: OTHER MISCELLANEOUS

## 2020-05-22 DIAGNOSIS — Z47.89 AFTERCARE FOLLOWING SURGERY OF THE MUSCULOSKELETAL SYSTEM: ICD-10-CM

## 2020-05-22 DIAGNOSIS — S46.812D TRAUMATIC TEAR OF SUPRASPINATUS TENDON OF LEFT SHOULDER, SUBSEQUENT ENCOUNTER: ICD-10-CM

## 2020-05-22 PROCEDURE — 97110 THERAPEUTIC EXERCISES: CPT | Mod: GP | Performed by: PHYSICAL THERAPIST

## 2020-05-22 NOTE — PROGRESS NOTES
Subjective:  HPI  Physical Exam                    Objective:  System    Physical Exam    General     ROS    Assessment/Plan:    SUBJECTIVE  Subjective changes as noted by pt:  Nick returns for his 2nd PT visit for gentle PROM, instruction on taking sling on/off. He reports over the past week not doing to bad.  HEP Codmans 2x (recommend 3x/day).   Current pain level: No change     Changes in function:  None     Adverse reaction to treatment or activity:  None    OBJECTIVE  Changes in objective findings:  Yes,   Objective: Op Report reports gentle PROM ok now, MD protocol limit shoulder PROM till week 4 - Patient is only doing elbow ROM at home and Codmans.  In PT moving slow with gentle PROM in clinic.    ASSESSMENT  Thomas continues to require intervention to meet STG and LTG's: PT  Patient is progressing as expected.  Progress made towards STG/LTG?  None    PLAN  Continue current treatment plan until patient demonstrates readiness to progress to higher level exercises.  Continue current treatment until MD allows progression of the treatment plan.  Cont to follow Dr Deb Olson/Massive RCR protocol with PROM- Pulley progressing at week 4-6, strengthening at 12 weeks.    PTA/ATC plan:  N/A    Please refer to the daily flowsheet for treatment today, total treatment time and time spent performing 1:1 timed codes.

## 2020-05-22 NOTE — PROGRESS NOTES
ORTHOPEDIC SURGERY POST-OPERATIVE VISIT NOTE    DATE OF SURGERY: 5/12/20    OPERATION PERFORMED: Left shoulder arthroscopic subscapularis supraspinatus, and infraspinatus rotator cuff repair, extensive labral debridement, release of adhesions of capsule and rotator cuff, subacromial decompression, and biceps tenodesis.     INTERVAL HISTORY: Thomas is a 50 year old, right-hand dominant male who returns today now 2 weeks out from the above operation.  Patient is accompanied by [facility], Mao Camarillo. Overall, Thomas reports that he is doing well. Thomas is currently using ice and Tylenol prn for pain control and states that he has been compliant with all post-operative restrictions. No intercurrent trauma. Denies any numbness, tingling, or weakness. No other issues or concerns. Overall he is pleased with his early progress but he is worried that he is going to mess up the repair.  Left upper extremity    EXAM:  Constitutional: Well-developed, well-nourished, healthy appearing male.  Skin: Warm, dry, and without rashes.   Cardiac: Well perfused extremities, strong 2+ peripheral pulses. No edema.   Pulmonary: Non-labored respirations on room air without audible wheezes.   Musculoskeletal: Focused examination demonstrates well-healed portal incisions, Steri-Strips placed.  No redness, warmth, or fluctuance.  No signs of infection.  Sensation is intact light touch in the axillary, musculocutaneous, median, radial, and ulnar nerve distributions.  5/5 , FPL, EPL, IO, wrist flexor, wrist extensor, biceps, triceps, and deltoid muscle strength.  Hand is warm and well-perfused.  2+ radial pulse.  Shoulder motion testing deferred.  Sling with abduction pillow replaced.    IMAGING:   No new imaging obtained or indicated.    IMPRESSION: 50 year old-year-old right hand dominant male now 2 weeks status-post the above. Overall doing very well.    PLAN:   1. Continue sling with abduction pillow for another 4 weeks.   2.  Continue PT with Massive cuff tear protocol. (Physical therapy will continue with some gentle passive range of motion exercises for now.  Active range of motion of the shoulder will initiate at 6 weeks, strengthening at 3 months)  3. Ok to continue to use icing and Tylenol PRN for discomfort.  4. Ok to resume light duty work with no lifting, pulling, or pushing with the left arm starting next week on June 1, 2020. Work note provided.  5. Return to clinic in 4 weeks for first motion check and repeat clinical exam. No imaging needed.    At the conclusion of the office visit, Thomas verbally acknowledged that I answered all of his questions satisfactorily.

## 2020-05-26 ENCOUNTER — OFFICE VISIT (OUTPATIENT)
Dept: ORTHOPEDICS | Facility: CLINIC | Age: 50
End: 2020-05-26
Payer: OTHER MISCELLANEOUS

## 2020-05-26 DIAGNOSIS — Z98.890 S/P ARTHROSCOPY OF LEFT SHOULDER: Primary | ICD-10-CM

## 2020-05-26 PROCEDURE — 99024 POSTOP FOLLOW-UP VISIT: CPT | Performed by: ORTHOPAEDIC SURGERY

## 2020-05-26 NOTE — LETTER
May 26, 2020      Thomas Pierce  [address] [address]        To Whom It May Concern:    Thomas Pierce was seen in our clinic. He may return to work  On 6/1/20 with the following: limited to light duty - No use of left arm.  No pushing, pulling or carrying with left arm. Must wear sling at all times.  No climbing, or crawling.  He will follow up with my office in 4 weeks for reevaluation and progression of activities.       Sincerely,        David Boyd MD

## 2020-05-26 NOTE — LETTER
5/26/2020         RE: Thomas Pierce  [address] [facility] [address]        Dear Colleague,    Thank you for referring your patient, Thomas Pierce, to the [facility] ORTHOPEDIC SURGERY. Please see a copy of my visit note below.    ORTHOPEDIC SURGERY POST-OPERATIVE VISIT NOTE    DATE OF SURGERY: 5/12/20    OPERATION PERFORMED: Left shoulder arthroscopic subscapularis supraspinatus, and infraspinatus rotator cuff repair, extensive labral debridement, release of adhesions of capsule and rotator cuff, subacromial decompression, and biceps tenodesis.     INTERVAL HISTORY: Thomas is a 50 year old, right-hand dominant male who returns today now 2 weeks out from the above operation.  Patient is accompanied by [facility], Mao Camarillo. Overall, Thomas reports that he is doing well. Thomas is currently using ice and Tylenol prn for pain control and states that he has been compliant with all post-operative restrictions. No intercurrent trauma. Denies any numbness, tingling, or weakness. No other issues or concerns. Overall he is pleased with his early progress but he is worried that he is going to mess up the repair.  Left upper extremity    EXAM:  Constitutional: Well-developed, well-nourished, healthy appearing male.  Skin: Warm, dry, and without rashes.   Cardiac: Well perfused extremities, strong 2+ peripheral pulses. No edema.   Pulmonary: Non-labored respirations on room air without audible wheezes.   Musculoskeletal: Focused examination demonstrates well-healed portal incisions, Steri-Strips placed.  No redness, warmth, or fluctuance.  No signs of infection.  Sensation is intact light touch in the axillary, musculocutaneous, median, radial, and ulnar nerve distributions.  5/5 , FPL, EPL, IO, wrist flexor, wrist extensor, biceps, triceps, and deltoid muscle strength.  Hand is warm and well-perfused.  2+ radial pulse.  Shoulder motion testing deferred.  Sling with abduction  pillow replaced.    IMAGING:   No new imaging obtained or indicated.    IMPRESSION: 50 year old-year-old right hand dominant male now 2 weeks status-post the above. Overall doing very well.    PLAN:   1. Continue sling with abduction pillow for another 4 weeks.   2. Continue PT with Massive cuff tear protocol. (Physical therapy will continue with some gentle passive range of motion exercises for now.  Active range of motion of the shoulder will initiate at 6 weeks, strengthening at 3 months)  3. Ok to continue to use icing and Tylenol PRN for discomfort.  4. Ok to resume light duty work with no lifting, pulling, or pushing with the left arm starting next week on June 1, 2020. Work note provided.  5. Return to clinic in 4 weeks for first motion check and repeat clinical exam. No imaging needed.    At the conclusion of the office visit, Thomas verbally acknowledged that I answered all of his questions satisfactorily.        Again, thank you for allowing me to participate in the care of your patient.        Sincerely,        David Boyd MD

## 2020-05-26 NOTE — PATIENT INSTRUCTIONS
Continue use of sling.   Physical therapy.     Follow up in 4 weeks.     Call my office with any questions or concerns, [phone].

## 2020-05-27 ENCOUNTER — MYC MEDICAL ADVICE (OUTPATIENT)
Dept: ORTHOPEDICS | Facility: CLINIC | Age: 50
End: 2020-05-27

## 2020-05-28 ENCOUNTER — THERAPY VISIT (OUTPATIENT)
Dept: PHYSICAL THERAPY | Facility: CLINIC | Age: 50
End: 2020-05-28
Payer: OTHER MISCELLANEOUS

## 2020-05-28 DIAGNOSIS — S46.812D TRAUMATIC TEAR OF SUPRASPINATUS TENDON OF LEFT SHOULDER, SUBSEQUENT ENCOUNTER: ICD-10-CM

## 2020-05-28 DIAGNOSIS — Z47.89 AFTERCARE FOLLOWING SURGERY OF THE MUSCULOSKELETAL SYSTEM: ICD-10-CM

## 2020-05-28 PROCEDURE — 97110 THERAPEUTIC EXERCISES: CPT | Mod: GP | Performed by: PHYSICAL THERAPY ASSISTANT

## 2020-05-28 NOTE — TELEPHONE ENCOUNTER
Disc was created by Radiology Team.   Able to provide patient with disc prior to completion of Physical Therapy.     Dena Mayer, ATC

## 2020-05-31 ENCOUNTER — MYC MEDICAL ADVICE (OUTPATIENT)
Dept: ORTHOPEDICS | Facility: CLINIC | Age: 50
End: 2020-05-31

## 2020-06-02 ENCOUNTER — THERAPY VISIT (OUTPATIENT)
Dept: PHYSICAL THERAPY | Facility: CLINIC | Age: 50
End: 2020-06-02
Payer: OTHER MISCELLANEOUS

## 2020-06-02 DIAGNOSIS — S46.812D TRAUMATIC TEAR OF SUPRASPINATUS TENDON OF LEFT SHOULDER, SUBSEQUENT ENCOUNTER: ICD-10-CM

## 2020-06-02 DIAGNOSIS — Z47.89 AFTERCARE FOLLOWING SURGERY OF THE MUSCULOSKELETAL SYSTEM: ICD-10-CM

## 2020-06-02 PROCEDURE — 97110 THERAPEUTIC EXERCISES: CPT | Mod: GP | Performed by: PHYSICAL THERAPIST

## 2020-06-09 ENCOUNTER — THERAPY VISIT (OUTPATIENT)
Dept: PHYSICAL THERAPY | Facility: CLINIC | Age: 50
End: 2020-06-09
Payer: OTHER MISCELLANEOUS

## 2020-06-09 DIAGNOSIS — Z47.89 AFTERCARE FOLLOWING SURGERY OF THE MUSCULOSKELETAL SYSTEM: ICD-10-CM

## 2020-06-09 DIAGNOSIS — S46.812D TRAUMATIC TEAR OF SUPRASPINATUS TENDON OF LEFT SHOULDER, SUBSEQUENT ENCOUNTER: ICD-10-CM

## 2020-06-09 PROCEDURE — 97110 THERAPEUTIC EXERCISES: CPT | Mod: GP | Performed by: PHYSICAL THERAPIST

## 2020-06-11 ENCOUNTER — THERAPY VISIT (OUTPATIENT)
Dept: PHYSICAL THERAPY | Facility: CLINIC | Age: 50
End: 2020-06-11
Payer: OTHER MISCELLANEOUS

## 2020-06-11 DIAGNOSIS — Z47.89 AFTERCARE FOLLOWING SURGERY OF THE MUSCULOSKELETAL SYSTEM: ICD-10-CM

## 2020-06-11 DIAGNOSIS — S46.812D TRAUMATIC TEAR OF SUPRASPINATUS TENDON OF LEFT SHOULDER, SUBSEQUENT ENCOUNTER: ICD-10-CM

## 2020-06-11 PROCEDURE — 97110 THERAPEUTIC EXERCISES: CPT | Mod: GP | Performed by: PHYSICAL THERAPIST

## 2020-06-16 ENCOUNTER — THERAPY VISIT (OUTPATIENT)
Dept: PHYSICAL THERAPY | Facility: CLINIC | Age: 50
End: 2020-06-16
Payer: OTHER MISCELLANEOUS

## 2020-06-16 DIAGNOSIS — S46.812D TRAUMATIC TEAR OF SUPRASPINATUS TENDON OF LEFT SHOULDER, SUBSEQUENT ENCOUNTER: ICD-10-CM

## 2020-06-16 DIAGNOSIS — Z47.89 AFTERCARE FOLLOWING SURGERY OF THE MUSCULOSKELETAL SYSTEM: ICD-10-CM

## 2020-06-16 PROCEDURE — 97110 THERAPEUTIC EXERCISES: CPT | Mod: GP | Performed by: PHYSICAL THERAPIST

## 2020-06-18 ENCOUNTER — THERAPY VISIT (OUTPATIENT)
Dept: PHYSICAL THERAPY | Facility: CLINIC | Age: 50
End: 2020-06-18
Payer: OTHER MISCELLANEOUS

## 2020-06-18 DIAGNOSIS — Z47.89 AFTERCARE FOLLOWING SURGERY OF THE MUSCULOSKELETAL SYSTEM: ICD-10-CM

## 2020-06-18 DIAGNOSIS — S46.812D TRAUMATIC TEAR OF SUPRASPINATUS TENDON OF LEFT SHOULDER, SUBSEQUENT ENCOUNTER: ICD-10-CM

## 2020-06-18 PROCEDURE — 97110 THERAPEUTIC EXERCISES: CPT | Mod: GP | Performed by: PHYSICAL THERAPIST

## 2020-06-18 NOTE — PROGRESS NOTES
Subjective:  HPI  Physical Exam                    Objective:  System    Physical Exam    General     ROS    Assessment/Plan:    PROGRESS  REPORT    Progress reporting period is from initial to 6/18/2020.       SUBJECTIVE  Subjective changes noted by patient:  Nick is 5 weeks s/p and has been able to increase his ROM HEP the past 2 weeks.  He reports work has been just fine with working light duty.  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    Current pain level is 0/10 at rest, pain 2-3/10 with ROM/stretching  Changes in function:  None  Adverse reaction to treatment or activity: None    OBJECTIVE  Changes noted in objective findings:  Yes, Nick has progressed well this week with progressing his PROM/AAROM:  Flex 100, Abd 90, ER 30     ASSESSMENT/PLAN  Updated problem list and treatment plan: Diagnosis 1:  S/p L RCR  Pain -  hot/cold therapy, self management, education and home program  Decreased ROM/flexibility - manual therapy and therapeutic exercise  Decreased joint mobility - manual therapy and therapeutic exercise  Decreased strength - therapeutic exercise and therapeutic activities  Inflammation - cold therapy  Decreased function - therapeutic activities  Impaired posture - neuro re-education  STG/LTGs have been met or progress has been made towards goals:  Yes (See Goal flow sheet completed today.)  Assessment of Progress: The patient's condition is improving.  Self Management Plans:  Patient has been instructed in a home treatment program.  I have re-evaluated this patient and find that the nature, scope, duration and intensity of the therapy is appropriate for the medical condition of the patient.  Thomas continues to require the following intervention to meet STG and LTG's:  PT    Recommendations:  This patient would benefit from continued therapy with following Large/Massive RCR protocol.  PT to cont  with PROM/AAROM over the next month.    Frequency:  1-2 X week, once daily  Duration:  for 6+ weeks    Please refer to the daily flowsheet for treatment today, total treatment time and time spent performing 1:1 timed codes.

## 2020-06-23 ENCOUNTER — THERAPY VISIT (OUTPATIENT)
Dept: PHYSICAL THERAPY | Facility: CLINIC | Age: 50
End: 2020-06-23
Payer: OTHER MISCELLANEOUS

## 2020-06-23 ENCOUNTER — OFFICE VISIT (OUTPATIENT)
Dept: ORTHOPEDICS | Facility: CLINIC | Age: 50
End: 2020-06-23
Payer: OTHER MISCELLANEOUS

## 2020-06-23 VITALS — DIASTOLIC BLOOD PRESSURE: 74 MMHG | SYSTOLIC BLOOD PRESSURE: 112 MMHG

## 2020-06-23 DIAGNOSIS — Z47.89 AFTERCARE FOLLOWING SURGERY OF THE MUSCULOSKELETAL SYSTEM: ICD-10-CM

## 2020-06-23 DIAGNOSIS — Z98.890 S/P ARTHROSCOPY OF LEFT SHOULDER: Primary | ICD-10-CM

## 2020-06-23 DIAGNOSIS — S46.812D TRAUMATIC TEAR OF SUPRASPINATUS TENDON OF LEFT SHOULDER, SUBSEQUENT ENCOUNTER: ICD-10-CM

## 2020-06-23 PROCEDURE — 97110 THERAPEUTIC EXERCISES: CPT | Mod: GP | Performed by: PHYSICAL THERAPIST

## 2020-06-23 PROCEDURE — 99024 POSTOP FOLLOW-UP VISIT: CPT | Performed by: ORTHOPAEDIC SURGERY

## 2020-06-23 NOTE — LETTER
June 23, 2020      Thomas Pierce  [address] [address]        To Whom It May Concern:    Thomas Pierce was seen in our clinic. He may return to work  On 6/1/20 with the following: limited to light duty - No use of left arm.  No pushing, pulling or carrying with left arm.  No climbing, or crawling.  He will follow up with my office in 6 weeks for reevaluation and progression of activities.       Sincerely,        David Boyd MD

## 2020-06-23 NOTE — PATIENT INSTRUCTIONS
Continue with Physical Therapy and home exercises.   Work letter provided, continue light duty.     OK to discontinue sling.     Follow up in 6 weeks, or at 3 month post operative jim.     Call my office with any questions or concerns, [phone].

## 2020-06-23 NOTE — LETTER
6/23/2020         RE: Thomas Pierce  [address] [address] [facility] [address]        Dear Colleague,    Thank you for referring your patient, Thomas Pierce, to the [facility] ORTHOPEDIC SURGERY. Please see a copy of my visit note below.    ORTHOPEDIC SURGERY POST-OPERATIVE VISIT NOTE    DATE OF SURGERY: 5/12/20    OPERATION PERFORMED: Left shoulder arthroscopic subscapularis supraspinatus, and infraspinatus rotator cuff repair, extensive labral debridement, release of adhesions of capsule and rotator cuff, subacromial decompression, and biceps tenodesis.     INTERVAL HISTORY: Thomas is a 50 year old, right-hand dominant male who returns today now 6 weeks out from the above operation.  Patient is accompanied by [facility], Mao Camarillo. Overall, Thomas reports that he is doing well.  He notes intermittent soreness at the anterolateral aspect of the shoulder, that is more prominent after stretching wiith Physical Therapy, and in the morning time.  Thomas is currently using ice prn for pain control and states that he has been compliant with all post-operative restrictions.  He has completed 8 sessions of Physical Therapy with [facility] Physical Therapists: Juan Carlos Wright, Robbin Zavala, and Usha Marquis (rotating schedule in place due to COVID-19 restrictions). He is also compliant with home exercise program. Since his last office visit he has returned to work with light duty restrictions, and has tolerated it well.  No intercurrent trauma. Denies any numbness, tingling, or weakness. No other issues or concerns. Overall he is pleased with his early progress.    EXAM:  Constitutional: Well-developed, well-nourished, healthy appearing male.  Skin: Warm, dry, and without rashes.   Cardiac: Well perfused extremities, strong 2+ peripheral pulses. No edema.   Pulmonary: Non-labored respirations on room air without audible wheezes.   Musculoskeletal: Focused examination demonstrates well-healed portal  incisions.  No redness, warmth, or fluctuance.  No signs of infection.  Sensation is intact light touch in the axillary, musculocutaneous, median, radial, and ulnar nerve distributions.  5/5 , FPL, EPL, IO, wrist flexor, wrist extensor, biceps, triceps, and deltoid muscle strength.  Hand is warm and well-perfused.  2+ radial pulse.  Gentle AAROM of the left shoulder shows forward elevation to 100, abduction to 80, and external rotation at the side to 40.     IMAGING:   No new imaging obtained or indicated.    IMPRESSION: 50 year old-year-old right hand dominant male now 6 weeks status-post the above. Overall doing very well, but does have some early expected stiffness following his massive rotator cuff repair.    PLAN:   1. Ok to wean from and discontinue sling.  2. Continue PT with Massive cuff tear protocol with new focus on normalizing shoulder range of motion in all planes with higher frequency and more aggressive AAROM stretching. Goal is for full symmetric motion by week 10.  3. Should continue to use icing and Tylenol/Ibuprofen following therapy or stretching sessions.  4. Ok to continue light duty work with no lifting, pulling, or pushing with the left arm. Work note renewed.  5. Return to clinic in 6 weeks for repeat motion check and repeat clinical exam. No imaging needed.    At the conclusion of the office visit, Thomas verbally acknowledged that I answered all of his questions satisfactorily.        Again, thank you for allowing me to participate in the care of your patient.        Sincerely,        David Boyd MD

## 2020-06-23 NOTE — PROGRESS NOTES
ORTHOPEDIC SURGERY POST-OPERATIVE VISIT NOTE    DATE OF SURGERY: 5/12/20    OPERATION PERFORMED: Left shoulder arthroscopic subscapularis supraspinatus, and infraspinatus rotator cuff repair, extensive labral debridement, release of adhesions of capsule and rotator cuff, subacromial decompression, and biceps tenodesis.     INTERVAL HISTORY: Thomas is a 50 year old, right-hand dominant male who returns today now 6 weeks out from the above operation.  Patient is accompanied by [facility], Mao Camarillo. Overall, Thomas reports that he is doing well.  He notes intermittent soreness at the anterolateral aspect of the shoulder, that is more prominent after stretching wiith Physical Therapy, and in the morning time.  Thomas is currently using ice prn for pain control and states that he has been compliant with all post-operative restrictions.  He has completed 8 sessions of Physical Therapy with [facility] Physical Therapists: Juan Carlos Wright, Robbin Zavala, and Usha Marquis (rotating schedule in place due to COVID-19 restrictions). He is also compliant with home exercise program. Since his last office visit he has returned to work with light duty restrictions, and has tolerated it well.  No intercurrent trauma. Denies any numbness, tingling, or weakness. No other issues or concerns. Overall he is pleased with his early progress.    EXAM:  Constitutional: Well-developed, well-nourished, healthy appearing male.  Skin: Warm, dry, and without rashes.   Cardiac: Well perfused extremities, strong 2+ peripheral pulses. No edema.   Pulmonary: Non-labored respirations on room air without audible wheezes.   Musculoskeletal: Focused examination demonstrates well-healed portal incisions.  No redness, warmth, or fluctuance.  No signs of infection.  Sensation is intact light touch in the axillary, musculocutaneous, median, radial, and ulnar nerve distributions.  5/5 , FPL, EPL, IO, wrist flexor, wrist extensor, biceps, triceps, and  deltoid muscle strength.  Hand is warm and well-perfused.  2+ radial pulse.  Gentle AAROM of the left shoulder shows forward elevation to 100, abduction to 80, and external rotation at the side to 40.     IMAGING:   No new imaging obtained or indicated.    IMPRESSION: 50 year old-year-old right hand dominant male now 6 weeks status-post the above. Overall doing very well, but does have some early expected stiffness following his massive rotator cuff repair.    PLAN:   1. Ok to wean from and discontinue sling.  2. Continue PT with Massive cuff tear protocol with new focus on normalizing shoulder range of motion in all planes with higher frequency and more aggressive AAROM stretching. Goal is for full symmetric motion by week 10.  3. Should continue to use icing and Tylenol/Ibuprofen following therapy or stretching sessions.  4. Ok to continue light duty work with no lifting, pulling, or pushing with the left arm. Work note renewed.  5. Return to clinic in 6 weeks for repeat motion check and repeat clinical exam. No imaging needed.    At the conclusion of the office visit, Thomas verbally acknowledged that I answered all of his questions satisfactorily.

## 2020-06-25 ENCOUNTER — THERAPY VISIT (OUTPATIENT)
Dept: PHYSICAL THERAPY | Facility: CLINIC | Age: 50
End: 2020-06-25
Payer: OTHER MISCELLANEOUS

## 2020-06-25 DIAGNOSIS — S46.812D TRAUMATIC TEAR OF SUPRASPINATUS TENDON OF LEFT SHOULDER, SUBSEQUENT ENCOUNTER: ICD-10-CM

## 2020-06-25 DIAGNOSIS — Z47.89 AFTERCARE FOLLOWING SURGERY OF THE MUSCULOSKELETAL SYSTEM: ICD-10-CM

## 2020-06-25 PROCEDURE — 97110 THERAPEUTIC EXERCISES: CPT | Mod: GP | Performed by: PHYSICAL THERAPIST

## 2020-07-02 ENCOUNTER — THERAPY VISIT (OUTPATIENT)
Dept: PHYSICAL THERAPY | Facility: CLINIC | Age: 50
End: 2020-07-02
Payer: OTHER MISCELLANEOUS

## 2020-07-02 DIAGNOSIS — S46.812D TRAUMATIC TEAR OF SUPRASPINATUS TENDON OF LEFT SHOULDER, SUBSEQUENT ENCOUNTER: ICD-10-CM

## 2020-07-02 DIAGNOSIS — Z47.89 AFTERCARE FOLLOWING SURGERY OF THE MUSCULOSKELETAL SYSTEM: ICD-10-CM

## 2020-07-02 PROCEDURE — 97110 THERAPEUTIC EXERCISES: CPT | Mod: GP | Performed by: PHYSICAL THERAPIST

## 2020-07-09 ENCOUNTER — THERAPY VISIT (OUTPATIENT)
Dept: PHYSICAL THERAPY | Facility: CLINIC | Age: 50
End: 2020-07-09
Payer: OTHER MISCELLANEOUS

## 2020-07-09 DIAGNOSIS — S46.812D TRAUMATIC TEAR OF SUPRASPINATUS TENDON OF LEFT SHOULDER, SUBSEQUENT ENCOUNTER: ICD-10-CM

## 2020-07-09 DIAGNOSIS — Z47.89 AFTERCARE FOLLOWING SURGERY OF THE MUSCULOSKELETAL SYSTEM: ICD-10-CM

## 2020-07-09 PROCEDURE — 97110 THERAPEUTIC EXERCISES: CPT | Mod: GP | Performed by: PHYSICAL THERAPIST

## 2020-07-16 ENCOUNTER — THERAPY VISIT (OUTPATIENT)
Dept: PHYSICAL THERAPY | Facility: CLINIC | Age: 50
End: 2020-07-16
Payer: OTHER MISCELLANEOUS

## 2020-07-16 DIAGNOSIS — Z47.89 AFTERCARE FOLLOWING SURGERY OF THE MUSCULOSKELETAL SYSTEM: ICD-10-CM

## 2020-07-16 DIAGNOSIS — S46.812D TRAUMATIC TEAR OF SUPRASPINATUS TENDON OF LEFT SHOULDER, SUBSEQUENT ENCOUNTER: ICD-10-CM

## 2020-07-16 PROCEDURE — 97110 THERAPEUTIC EXERCISES: CPT | Mod: GP | Performed by: PHYSICAL THERAPIST

## 2020-07-23 ENCOUNTER — THERAPY VISIT (OUTPATIENT)
Dept: PHYSICAL THERAPY | Facility: CLINIC | Age: 50
End: 2020-07-23
Payer: OTHER MISCELLANEOUS

## 2020-07-23 DIAGNOSIS — S46.812D TRAUMATIC TEAR OF SUPRASPINATUS TENDON OF LEFT SHOULDER, SUBSEQUENT ENCOUNTER: ICD-10-CM

## 2020-07-23 DIAGNOSIS — Z47.89 AFTERCARE FOLLOWING SURGERY OF THE MUSCULOSKELETAL SYSTEM: ICD-10-CM

## 2020-07-23 PROCEDURE — 97110 THERAPEUTIC EXERCISES: CPT | Mod: GP | Performed by: PHYSICAL THERAPIST

## 2020-07-30 ENCOUNTER — THERAPY VISIT (OUTPATIENT)
Dept: PHYSICAL THERAPY | Facility: CLINIC | Age: 50
End: 2020-07-30
Payer: OTHER MISCELLANEOUS

## 2020-07-30 DIAGNOSIS — S46.812D TRAUMATIC TEAR OF SUPRASPINATUS TENDON OF LEFT SHOULDER, SUBSEQUENT ENCOUNTER: ICD-10-CM

## 2020-07-30 DIAGNOSIS — Z47.89 AFTERCARE FOLLOWING SURGERY OF THE MUSCULOSKELETAL SYSTEM: ICD-10-CM

## 2020-07-30 PROCEDURE — 97110 THERAPEUTIC EXERCISES: CPT | Mod: GP | Performed by: PHYSICAL THERAPIST

## 2020-07-30 NOTE — PROGRESS NOTES
Subjective:  HPI  Physical Exam                    Objective:  System                   Shoulder Evaluation:  ROM:  AROM:    Flexion:  Left:  89                              PROM:    Flexion:  Left:  155              Internal Rotation:  Left:  80 in 90/90      External Rotation:  Left:  70 in  90/90                                                                   General     ROS    Assessment/Plan:    PROGRESS  REPORT    Progress reporting period is from June 2020 to July 30 2020.       SUBJECTIVE  Subjective changes noted by patient:  Nick is 11 weeks s/p massive RCR.  He reports his work is going well, following MD restrictions. He is performing his AAROM/PROM HEP well some nights he is tired after work and missing his ROM. Nick has started working on AROM, ER/IR tubing strengthening HEP and will continue to progress his AROM/Strength over the next 6+ weeks.  Current pain level is 3/10.     Previous pain level was  8/10.   Changes in function:  Yes (See Goal flowsheet attached for changes in current functional level)  Adverse reaction to treatment or activity: None    OBJECTIVE  Changes noted in objective findings:  Yes, AAROM/PROM has improved well over the past 2 months.  AROM/Strength will continue to improve as Nick is lightly progressing with this.         ASSESSMENT/PLAN  Updated problem list and treatment plan: Diagnosis 1:  S/p L massive RCR 11 weeks s/p  Pain -  hot/cold therapy, self management, education and home program  Decreased ROM/flexibility - manual therapy and therapeutic exercise  Decreased strength - therapeutic exercise and therapeutic activities  Inflammation - cold therapy  Decreased function - therapeutic activities  Impaired posture - neuro re-education  STG/LTGs have been met or progress has been made towards goals:  Yes (See Goal flow sheet completed today.)  Assessment of Progress: The patient's condition is improving.  Self Management Plans:  Patient has been instructed in a home  treatment program.  Patient  has been instructed in self management of symptoms.  I have re-evaluated this patient and find that the nature, scope, duration and intensity of the therapy is appropriate for the medical condition of the patient.  Thomas continues to require the following intervention to meet STG and LTG's:  PT    Recommendations:  This patient would benefit from continued therapy, as Nick approaches 12 weeks s/p he will be instructed in starting scapular strengthening PRE's and progress shoulder AROM as able.  Currently Nick is meeting targets on protocol.  Nick has 3 more approved visits from his Work Comp claim.    Frequency:  2-3 X a month, once daily  Duration:  for 2 months    Please refer to the daily flowsheet for treatment today, total treatment time and time spent performing 1:1 timed codes.

## 2020-08-04 ENCOUNTER — OFFICE VISIT (OUTPATIENT)
Dept: ORTHOPEDICS | Facility: CLINIC | Age: 50
End: 2020-08-04
Payer: OTHER MISCELLANEOUS

## 2020-08-04 VITALS
WEIGHT: 282 LBS | SYSTOLIC BLOOD PRESSURE: 130 MMHG | BODY MASS INDEX: 38.19 KG/M2 | HEIGHT: 72 IN | DIASTOLIC BLOOD PRESSURE: 99 MMHG

## 2020-08-04 DIAGNOSIS — Z98.890 S/P ARTHROSCOPY OF LEFT SHOULDER: Primary | ICD-10-CM

## 2020-08-04 PROCEDURE — 99024 POSTOP FOLLOW-UP VISIT: CPT | Performed by: ORTHOPAEDIC SURGERY

## 2020-08-04 ASSESSMENT — MIFFLIN-ST. JEOR: SCORE: 2167.14

## 2020-08-04 NOTE — PATIENT INSTRUCTIONS
Work letter provided, same work restrictions as prior.     Follow up in 6 weeks with Dr. Boyd for reevaluation.     Call my office with any questions or concerns, [phone].

## 2020-08-04 NOTE — PROGRESS NOTES
ORTHOPEDIC SURGERY POST-OPERATIVE VISIT NOTE    DATE OF SURGERY: 5/12/20    OPERATION PERFORMED: Left shoulder arthroscopic subscapularis supraspinatus, and infraspinatus rotator cuff repair, extensive labral debridement, release of adhesions of capsule and rotator cuff, subacromial decompression, and biceps tenodesis.     INTERVAL HISTORY: Thomas is a 50 year old, right-hand dominant male who returns today now 12 weeks out from the above operation. Overall, Thomas reports that he is doing well.  He notes significant improvements in pain since his last visit.  He now has hardly any pain.  Thomas is currently using intermittent Advil for pain control and states that he has been compliant with all post-operative restrictions.  He feels that he is making appreciable progress with his physical therapy.  He has weaned from and discontinued the sling as instructed at his last visit.  No intercurrent trauma. Denies any numbness, tingling, or weakness. No other issues or concerns.    EXAM:  Constitutional: Well-developed, well-nourished, healthy appearing male.  Skin: Warm, dry, and without rashes.   Cardiac: Well perfused extremities, strong 2+ peripheral pulses. No edema.   Pulmonary: Non-labored respirations on room air without audible wheezes.   Musculoskeletal: Focused examination of the left upper extremity demonstrates well-healed arthroscopic portal incisions.  No signs of infection.  Neurovascularly intact.  Left shoulder active range of motion with forward elevation to 90 degrees, passive forward elevation to 155 degrees, internal rotation to the buttock, external rotation at the side to 35 degrees.  5 out of 5 external rotation and internal rotation strength.  4 out of 5 forward elevation strength.  No Sean deformity.    IMAGING:   No new imaging obtained or warranted.    IMPRESSION: 50 year old-year-old right hand dominant male now 12 weeks status-post the above. Overall doing very  well.    PLAN:   I had a nice discussion with Thomas today. He is making progress as expected following a massive cuff repair.  He still has some ongoing left shoulder stiffness following his arthroscopic massive rotator cuff repair and I explained that now is the time to continue to work on more aggressively mobilizing the shoulder joint, however at the same time stiffness is predictive of cuff healing which might be taken as an early good sign.  We will continue to advance his motion with both passive and active motion exercises.  At the 14-week jim we will add weights and begin to work on more dedicated rotator cuff strengthening as outlined in my massive rotator cuff repair rehab plan. Work note updated to continue current restrictions. Return to clinic in  6 weeks for repeat clinical examination and likely advancement to further work activities at that time pending continued progress with strength and mobility in the left shoulder.    At the conclusion of the office visit, Thomas verbally acknowledged that I answered all of his questions satisfactorily.

## 2020-08-04 NOTE — LETTER
8/4/2020         RE: Thomas Pierce  [address] [facility] [address]        Dear Colleague,    Thank you for referring your patient, Thomas Pierce, to the [facility] ORTHOPEDIC SURGERY. Please see a copy of my visit note below.    ORTHOPEDIC SURGERY POST-OPERATIVE VISIT NOTE    DATE OF SURGERY: 5/12/20    OPERATION PERFORMED: Left shoulder arthroscopic subscapularis supraspinatus, and infraspinatus rotator cuff repair, extensive labral debridement, release of adhesions of capsule and rotator cuff, subacromial decompression, and biceps tenodesis.     INTERVAL HISTORY: Thomas is a 50 year old, right-hand dominant male who returns today now 12 weeks out from the above operation. Overall, Thomas reports that he is doing well.  He notes significant improvements in pain since his last visit.  He now has hardly any pain.  Thomas is currently using intermittent Advil for pain control and states that he has been compliant with all post-operative restrictions.  He feels that he is making appreciable progress with his physical therapy.  He has weaned from and discontinued the sling as instructed at his last visit.  No intercurrent trauma. Denies any numbness, tingling, or weakness. No other issues or concerns.    EXAM:  Constitutional: Well-developed, well-nourished, healthy appearing male.  Skin: Warm, dry, and without rashes.   Cardiac: Well perfused extremities, strong 2+ peripheral pulses. No edema.   Pulmonary: Non-labored respirations on room air without audible wheezes.   Musculoskeletal: Focused examination of the left upper extremity demonstrates well-healed arthroscopic portal incisions.  No signs of infection.  Neurovascularly intact.  Left shoulder active range of motion with forward elevation to 90 degrees, passive forward elevation to 155 degrees, internal rotation to the buttock, external rotation at the side to 35 degrees.  5 out of 5 external rotation and internal  rotation strength.  4 out of 5 forward elevation strength.  No Sean deformity.    IMAGING:   No new imaging obtained or warranted.    IMPRESSION: 50 year old-year-old right hand dominant male now 12 weeks status-post the above. Overall doing very well.    PLAN:   I had a nice discussion with Thomas today. He is making progress as expected following a massive cuff repair.  He still has some ongoing left shoulder stiffness following his arthroscopic massive rotator cuff repair and I explained that now is the time to continue to work on more aggressively mobilizing the shoulder joint, however at the same time stiffness is predictive of cuff healing which might be taken as an early good sign.  We will continue to advance his motion with both passive and active motion exercises.  At the 14-week jim we will add weights and begin to work on more dedicated rotator cuff strengthening as outlined in my massive rotator cuff repair rehab plan. Work note updated to continue current restrictions. Return to clinic in  6 weeks for repeat clinical examination and likely advancement to further work activities at that time pending continued progress with strength and mobility in the left shoulder.    At the conclusion of the office visit, Thomas verbally acknowledged that I answered all of his questions satisfactorily.        Again, thank you for allowing me to participate in the care of your patient.        Sincerely,        David Boyd MD

## 2020-08-04 NOTE — LETTER
August 4, 2020      Thomas Pierce  [address] [facility] [address]        To Whom It May Concern:    Thomas Pierce was seen in our clinic today. ABLE to work with the following restrictions: limited to light duty - No use of left arm.  No pushing, pulling or carrying with left arm.  No climbing, or crawling.  He will follow up with my office in 6 weeks for reevaluation and progression of activities at that time.       Sincerely,          David Boyd MD

## 2020-08-06 ENCOUNTER — THERAPY VISIT (OUTPATIENT)
Dept: PHYSICAL THERAPY | Facility: CLINIC | Age: 50
End: 2020-08-06
Payer: OTHER MISCELLANEOUS

## 2020-08-06 DIAGNOSIS — Z47.89 AFTERCARE FOLLOWING SURGERY OF THE MUSCULOSKELETAL SYSTEM: ICD-10-CM

## 2020-08-06 DIAGNOSIS — S46.812D TRAUMATIC TEAR OF SUPRASPINATUS TENDON OF LEFT SHOULDER, SUBSEQUENT ENCOUNTER: ICD-10-CM

## 2020-08-06 PROCEDURE — 97110 THERAPEUTIC EXERCISES: CPT | Mod: GP | Performed by: PHYSICAL THERAPIST

## 2020-08-06 NOTE — PROGRESS NOTES
[facility] Initial Evaluation  Subjective:  HPI                    Objective:  System    Physical Exam    General     ROS    Assessment/Plan:

## 2020-08-14 ENCOUNTER — THERAPY VISIT (OUTPATIENT)
Dept: PHYSICAL THERAPY | Facility: CLINIC | Age: 50
End: 2020-08-14
Payer: OTHER MISCELLANEOUS

## 2020-08-14 DIAGNOSIS — Z47.89 AFTERCARE FOLLOWING SURGERY OF THE MUSCULOSKELETAL SYSTEM: ICD-10-CM

## 2020-08-14 DIAGNOSIS — S46.812A: ICD-10-CM

## 2020-08-14 PROCEDURE — 97110 THERAPEUTIC EXERCISES: CPT | Mod: GP | Performed by: PHYSICAL THERAPIST

## 2020-08-14 PROCEDURE — 97112 NEUROMUSCULAR REEDUCATION: CPT | Mod: GP | Performed by: PHYSICAL THERAPIST

## 2020-08-14 PROCEDURE — 97140 MANUAL THERAPY 1/> REGIONS: CPT | Mod: GP | Performed by: PHYSICAL THERAPIST

## 2020-08-21 ENCOUNTER — TELEPHONE (OUTPATIENT)
Dept: PHYSICAL THERAPY | Facility: CLINIC | Age: 50
End: 2020-08-21

## 2020-08-21 ENCOUNTER — THERAPY VISIT (OUTPATIENT)
Dept: PHYSICAL THERAPY | Facility: CLINIC | Age: 50
End: 2020-08-21
Payer: OTHER MISCELLANEOUS

## 2020-08-21 DIAGNOSIS — S46.812A: ICD-10-CM

## 2020-08-21 DIAGNOSIS — Z47.89 AFTERCARE FOLLOWING SURGERY OF THE MUSCULOSKELETAL SYSTEM: ICD-10-CM

## 2020-08-21 PROCEDURE — 97112 NEUROMUSCULAR REEDUCATION: CPT | Mod: GP | Performed by: PHYSICAL THERAPIST

## 2020-08-21 PROCEDURE — 97110 THERAPEUTIC EXERCISES: CPT | Mod: GP | Performed by: PHYSICAL THERAPIST

## 2020-08-21 NOTE — TELEPHONE ENCOUNTER
LVM for Lisa Leopold   Phone: [phone], asking for 6 more PT visits to be approved beyond the 18 visits already approved.   PT plan on seeing Nick 1x/week till he sees his MD again on 9/15/2020.

## 2020-08-26 ENCOUNTER — THERAPY VISIT (OUTPATIENT)
Dept: PHYSICAL THERAPY | Facility: CLINIC | Age: 50
End: 2020-08-26
Payer: OTHER MISCELLANEOUS

## 2020-08-26 DIAGNOSIS — Z47.89 AFTERCARE FOLLOWING SURGERY OF THE MUSCULOSKELETAL SYSTEM: ICD-10-CM

## 2020-08-26 DIAGNOSIS — S46.812A: ICD-10-CM

## 2020-08-26 PROCEDURE — 97110 THERAPEUTIC EXERCISES: CPT | Mod: GP | Performed by: PHYSICAL THERAPIST

## 2020-08-26 PROCEDURE — 97112 NEUROMUSCULAR REEDUCATION: CPT | Mod: GP | Performed by: PHYSICAL THERAPIST

## 2020-09-01 ENCOUNTER — THERAPY VISIT (OUTPATIENT)
Dept: PHYSICAL THERAPY | Facility: CLINIC | Age: 50
End: 2020-09-01
Payer: OTHER MISCELLANEOUS

## 2020-09-01 DIAGNOSIS — Z47.89 AFTERCARE FOLLOWING SURGERY OF THE MUSCULOSKELETAL SYSTEM: ICD-10-CM

## 2020-09-01 DIAGNOSIS — S46.812A: ICD-10-CM

## 2020-09-01 PROCEDURE — 97112 NEUROMUSCULAR REEDUCATION: CPT | Mod: GP | Performed by: PHYSICAL THERAPIST

## 2020-09-01 PROCEDURE — 97110 THERAPEUTIC EXERCISES: CPT | Mod: GP | Performed by: PHYSICAL THERAPIST

## 2020-09-01 NOTE — PROGRESS NOTES
Subjective:  HPI  Physical Exam                    Objective:  System    Physical Exam    General     ROS    Assessment/Plan:    PROGRESS  REPORT    Progress reporting period is from 7/30/2020 to 9/1/2020.       SUBJECTIVE  Subjective: Overall shoulder is feeling well. Pt notes HEP going well but is very slow process. Trying to use left arm more with functional movements. Stretching daily, strengthening every other day.     Current Pain level: 1/10.     Previous pain level was  8/10  .   Changes in function:  Yes (See Goal flowsheet attached for changes in current functional level)  Adverse reaction to treatment or activity: None    OBJECTIVE  Changes noted in objective findings:  Yes,   Objective: AROM: L shoulder flexion=85; abd= 85; ER=55; Ext/Add/IR=L5. Good tolerance to UBE and review of HEP. Continues to substitute upper trap for supraspinatus strengthening.      ASSESSMENT/PLAN  Updated problem list and treatment plan: Diagnosis 1:  L shoulder pain, post rotator cuff repair  Pain -  hot/cold therapy, self management, education and home program  Decreased ROM/flexibility - manual therapy and therapeutic exercise  Decreased joint mobility - manual therapy and therapeutic exercise  Decreased strength - therapeutic exercise and therapeutic activities  Inflammation - cold therapy and self management/home program  Impaired muscle performance - neuro re-education  Decreased function - therapeutic activities  Impaired posture - neuro re-education  Instability -  Therapeutic Activity  Therapeutic Exercise  STG/LTGs have been met or progress has been made towards goals:  Yes (See Goal flow sheet completed today.)  Assessment of Progress: The patient's condition has potential to improve.  Self Management Plans:  Patient has been instructed in a home treatment program.  I have re-evaluated this patient and find that the nature, scope, duration and intensity of the therapy is appropriate for the medical condition of the  patientJace Guzman continues to require the following intervention to meet STG and LTG's:  PT    Recommendations:  This patient would benefit from continued therapy.     Frequency:  1 X week, once daily  Duration:  for 6 weeks        Please refer to the daily flowsheet for treatment today, total treatment time and time spent performing 1:1 timed codes.

## 2020-09-08 ENCOUNTER — THERAPY VISIT (OUTPATIENT)
Dept: PHYSICAL THERAPY | Facility: CLINIC | Age: 50
End: 2020-09-08
Payer: OTHER MISCELLANEOUS

## 2020-09-08 DIAGNOSIS — Z47.89 AFTERCARE FOLLOWING SURGERY OF THE MUSCULOSKELETAL SYSTEM: ICD-10-CM

## 2020-09-08 DIAGNOSIS — S46.812A: ICD-10-CM

## 2020-09-08 PROCEDURE — 97110 THERAPEUTIC EXERCISES: CPT | Mod: GP | Performed by: PHYSICAL THERAPIST

## 2020-09-08 PROCEDURE — 97112 NEUROMUSCULAR REEDUCATION: CPT | Mod: GP | Performed by: PHYSICAL THERAPIST

## 2020-09-08 NOTE — PROGRESS NOTES
Subjective:  HPI  Physical Exam                    Objective:  System                   Shoulder Evaluation:  ROM:  AROM:  : after Rx L shoulder elevation to 100 degrees.  Flexion:  Left:  95    Right:  160    Abduction:  Left: 95   Right:  160      External Rotation:  Left:  55    Right:  80            Extension/Internal Rotation:  Left:  L1    Right:  T10    PROM:  : ER/IR in 90/90 R Shoulder 90ER/70IR, L Shoulder 70ER/70IR.                                Strength:    Flexion: Left:3+/5   Pain:    Right: 5/5     Pain:     Abduction:  Left: 3+/5  Pain:    Right: 5/5     Pain:    Internal Rotation:  Left:5-/5     Pain:    Right: 5/5     Pain:  External Rotation:   Left:4/5     Pain:   Right:5-/5     Pain:                                                     General     ROS    Assessment/Plan:    PROGRESS  REPORT    Progress reporting period is from August 2020 to Sept 8 2020.       SUBJECTIVE  Subjective changes noted by patient:  Nick is now 17 weeks s/p Large/Massive RCR surgery.  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  Nick has continued with his PT HEP AAROM/ROM Daily, Strengthening 3-4x/week. He reports after doing his PROM Ex's he feels his arm stiffness up soon after.  Current pain level is 0/10 .     Previous pain level was  8/10  .   Changes in function:  Yes (See Goal flowsheet attached for changes in current functional level)  Adverse reaction to treatment or activity: None    OBJECTIVE  Changes noted in objective findings:  Yes, Nick has improved with his tolerance to his strengthening Ex's with adding #1 and targeting 20-30 reps for each exercise.  Nick does have continued weakness/limits with Flex/Scaption Strength.  His ER strength has improved.  Objective: see above    ASSESSMENT/PLAN  Updated problem list and treatment plan: Diagnosis 1:  S/p Large/Massive RCR  Decreased ROM/flexibility - manual therapy and therapeutic exercise  Decreased joint  mobility - manual therapy and therapeutic exercise  Decreased strength - therapeutic exercise and therapeutic activities  Inflammation - cold therapy  Decreased function - therapeutic activities  Impaired posture - neuro re-education  STG/LTGs have been met or progress has been made towards goals:  Yes (See Goal flow sheet completed today.)  Assessment of Progress: The patient's condition is improving.  The patient's progress has plateaued.  Self Management Plans:  Patient has been instructed in a home treatment program.  Patient  has been instructed in self management of symptoms.  I have re-evaluated this patient and find that the nature, scope, duration and intensity of the therapy is appropriate for the medical condition of the patient.  Thomas continues to require the following intervention to meet STG and LTG's:  PT    Recommendations:  This patient would benefit from further evaluation.  We have 3 more  PT visits that are approved and suggest spacing these last 3 visits apart every 2 weeks.    Nick is able to move his arm little better, he doesn't challenge himself to use his arm during the day and it would be good for him to use his arm more at home and also advance his work tasks if possible.      Please refer to the daily flowsheet for treatment today, total treatment time and time spent performing 1:1 timed codes.

## 2020-09-15 ENCOUNTER — OFFICE VISIT (OUTPATIENT)
Dept: ORTHOPEDICS | Facility: CLINIC | Age: 50
End: 2020-09-15
Payer: OTHER MISCELLANEOUS

## 2020-09-15 ENCOUNTER — TELEPHONE (OUTPATIENT)
Dept: ORTHOPEDICS | Facility: CLINIC | Age: 50
End: 2020-09-15

## 2020-09-15 ENCOUNTER — MYC MEDICAL ADVICE (OUTPATIENT)
Dept: ORTHOPEDICS | Facility: CLINIC | Age: 50
End: 2020-09-15

## 2020-09-15 VITALS
WEIGHT: 282 LBS | DIASTOLIC BLOOD PRESSURE: 96 MMHG | SYSTOLIC BLOOD PRESSURE: 140 MMHG | BODY MASS INDEX: 38.19 KG/M2 | HEIGHT: 72 IN

## 2020-09-15 DIAGNOSIS — Z98.890 S/P ARTHROSCOPY OF LEFT SHOULDER: Primary | ICD-10-CM

## 2020-09-15 PROCEDURE — 99213 OFFICE O/P EST LOW 20 MIN: CPT | Performed by: ORTHOPAEDIC SURGERY

## 2020-09-15 ASSESSMENT — MIFFLIN-ST. JEOR: SCORE: 2167.14

## 2020-09-15 NOTE — PROGRESS NOTES
ORTHOPEDIC SURGERY POST-OPERATIVE VISIT NOTE    DATE OF SURGERY: 5/12/20    OPERATION PERFORMED: Left shoulder arthroscopic subscapularis supraspinatus, and infraspinatus rotator cuff repair, extensive labral debridement, release of adhesions of capsule and rotator cuff, subacromial decompression, and biceps tenodesis.     INTERVAL HISTORY: Thomas is a 50 year old, right-hand dominant male who returns today now 4 months out from the above operation. Overall, Thomas reports that he is doing well overall. States that PT is going well, is a little behind but he is still doing well and feels like he is making slow, but steady progress. No intercurrent trauma. Denies any numbness, tingling, or weakness. No other issues or concerns.    EXAM:  Constitutional: Well-developed, well-nourished, healthy appearing male.  Skin: Warm, dry, and without rashes.   Cardiac: Well perfused extremities, strong 2+ peripheral pulses. No edema.   Pulmonary: Non-labored respirations on room air without audible wheezes.   Musculoskeletal: Focused examination of the left upper extremity demonstrates well-healed arthroscopic portal incisions.  No signs of infection.  Neurovascularly intact.  Left shoulder active range of motion with forward elevation to 90 degrees while seated, but forward elevation to 160 degrees when lying down, internal rotation to L4, external rotation at the side to 40 degrees.  5 out of 5 external rotation and internal rotation strength.  4+ out of 5 forward elevation strength.  No Sean deformity.    IMAGING:   No new imaging obtained or warranted.    IMPRESSION: 50 year old-year-old right hand dominant male now 4 months status-post the above. Overall doing well, but making slower progress than anticipated given his deconditioning of his rotator cuff musculature.      PLAN:   I had a nice discussion with Thomas today. His progress is a little bit slower than expected.  He is still dealing with some  residual stiffness specifically with abduction, and external rotation in abduction.  He also has continued weakness with his supraspinatus.  I denies discussion with him today.  At this point after surgery I really encouraged him to push his motion to restore the flexibility to his shoulder.  In addition I would like him to work more aggressively on supine supraspinatus strengthening exercises.    With regards to work, I had a long discussion with him and his [facility] today.  It is safe at 4 months after surgery to start advancing his activities.  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  Work restrictions were given to him.  Return to clinic in 6 weeks for repeat clinical examination.  If doing well, may progress his work restrictions at that time.    At the conclusion of the office visit, Thomas verbally acknowledged that I answered all of his questions satisfactorily.

## 2020-09-15 NOTE — TELEPHONE ENCOUNTER
3:07pm on 9/15/2020.   Mao Julian the [facility] needs the Report of Workability to properly state the date of 9/15/2020 instead of 2020 for Thomas Pierce DOB 1970.   Please fax the report to [phone] with the correct date of 9/15/2020.  If you have questions he can be reached at [phone].

## 2020-09-15 NOTE — LETTER
September 15th, 2020      Thomas Pierce  [address] [facility] [address]        To Whom It May Concern:    Thomas Pierce was seen in our clinic today. ABLE to work with the following restrictions: limited to light duty - for his left arm no pushing, pulling or carrying greater than 50lbs. No lifting greater than 10 lbs away from his body and ok to drive. He will follow up with my office in 6 weeks for reevaluation and progression of activities at that time.       Sincerely,          David Boyd MD

## 2020-09-15 NOTE — PATIENT INSTRUCTIONS
Work letter provided    Follow up with Dr. Boyd in 6 weeks    Call my office with any questions or concerns, [phone].

## 2020-09-15 NOTE — TELEPHONE ENCOUNTER
Elisa Cavanaugh [facility], left voicemail stating patient saw Dr. Boyd today at 8:00 am and they received a new workability letter with an old date on it.   Please fax corrected letter to: [phone].   No need to call him back unless there are questions.     Letter faxed to number above and confirmed it went through via [employer].     KIMO Alford RN

## 2020-09-15 NOTE — LETTER
August 4, 2020      Thomas Pierce  [address] [facility] [address]        To Whom It May Concern:    Thomas Pierce was seen in our clinic today. ABLE to work with the following restrictions: limited to light duty - for his left arm no pushing, pulling or carrying greater than 50lbs. No lifting greater than 10 lbs away from his body and ok to drive. He will follow up with my office in 6 weeks for reevaluation and progression of activities at that time.       Sincerely,          David Boyd MD

## 2020-09-15 NOTE — LETTER
9/15/2020         RE: Thomas Pierce  [address] [facility] [address]        Dear Colleague,    Thank you for referring your patient, Thomas Pierce, to the [facility] ORTHOPEDIC SURGERY. Please see a copy of my visit note below.    ORTHOPEDIC SURGERY POST-OPERATIVE VISIT NOTE    DATE OF SURGERY: 5/12/20    OPERATION PERFORMED: Left shoulder arthroscopic subscapularis supraspinatus, and infraspinatus rotator cuff repair, extensive labral debridement, release of adhesions of capsule and rotator cuff, subacromial decompression, and biceps tenodesis.     INTERVAL HISTORY: Thomas is a 50 year old, right-hand dominant male who returns today now 4 months out from the above operation. Overall, Thomas reports that he is doing well overall. States that PT is going well, is a little behind but he is still doing well and feels like he is making slow, but steady progress. No intercurrent trauma. Denies any numbness, tingling, or weakness. No other issues or concerns.    EXAM:  Constitutional: Well-developed, well-nourished, healthy appearing male.  Skin: Warm, dry, and without rashes.   Cardiac: Well perfused extremities, strong 2+ peripheral pulses. No edema.   Pulmonary: Non-labored respirations on room air without audible wheezes.   Musculoskeletal: Focused examination of the left upper extremity demonstrates well-healed arthroscopic portal incisions.  No signs of infection.  Neurovascularly intact.  Left shoulder active range of motion with forward elevation to 90 degrees while seated, but forward elevation to 160 degrees when lying down, internal rotation to L4, external rotation at the side to 40 degrees.  5 out of 5 external rotation and internal rotation strength.  4+ out of 5 forward elevation strength.  No Sean deformity.    IMAGING:   No new imaging obtained or warranted.    IMPRESSION: 50 year old-year-old right hand dominant male now 4 months status-post the above. Overall  doing well, but making slower progress than anticipated given his deconditioning of his rotator cuff musculature.      PLAN:   I had a nice discussion with Thomas today. His progress is a little bit slower than expected.  He is still dealing with some residual stiffness specifically with abduction, and external rotation in abduction.  He also has continued weakness with his supraspinatus.  I denies discussion with him today.  At this point after surgery I really encouraged him to push his motion to restore the flexibility to his shoulder.  In addition I would like him to work more aggressively on supine supraspinatus strengthening exercises.    With regards to work, I had a long discussion with him and his [facility] today.  It is safe at 4 months after surgery to start advancing his activities.  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  Work restrictions were given to him.  Return to clinic in 6 weeks for repeat clinical examination.  If doing well, may progress his work restrictions at that time.    At the conclusion of the office visit, Thomas verbally acknowledged that I answered all of his questions satisfactorily.        Again, thank you for allowing me to participate in the care of your patient.        Sincerely,        David Boyd MD

## 2020-09-16 ENCOUNTER — THERAPY VISIT (OUTPATIENT)
Dept: PHYSICAL THERAPY | Facility: CLINIC | Age: 50
End: 2020-09-16
Payer: OTHER MISCELLANEOUS

## 2020-09-16 DIAGNOSIS — S46.812A: ICD-10-CM

## 2020-09-16 DIAGNOSIS — Z47.89 AFTERCARE FOLLOWING SURGERY OF THE MUSCULOSKELETAL SYSTEM: ICD-10-CM

## 2020-09-16 PROCEDURE — 97110 THERAPEUTIC EXERCISES: CPT | Mod: GP | Performed by: PHYSICAL THERAPIST

## 2020-09-16 PROCEDURE — 97112 NEUROMUSCULAR REEDUCATION: CPT | Mod: GP | Performed by: PHYSICAL THERAPIST

## 2020-09-17 NOTE — TELEPHONE ENCOUNTER
Late entry: See mychart dated 9/15/20. Corrected letter faxed and message handled on 9/15/20.     KIMO Alford RN

## 2020-09-29 ENCOUNTER — THERAPY VISIT (OUTPATIENT)
Dept: PHYSICAL THERAPY | Facility: CLINIC | Age: 50
End: 2020-09-29
Payer: OTHER MISCELLANEOUS

## 2020-09-29 DIAGNOSIS — Z47.89 AFTERCARE FOLLOWING SURGERY OF THE MUSCULOSKELETAL SYSTEM: ICD-10-CM

## 2020-09-29 DIAGNOSIS — S46.812A: ICD-10-CM

## 2020-09-29 PROCEDURE — 97110 THERAPEUTIC EXERCISES: CPT | Mod: GP | Performed by: PHYSICAL THERAPIST

## 2020-09-29 PROCEDURE — 97112 NEUROMUSCULAR REEDUCATION: CPT | Mod: GP | Performed by: PHYSICAL THERAPIST

## 2020-10-13 ENCOUNTER — THERAPY VISIT (OUTPATIENT)
Dept: PHYSICAL THERAPY | Facility: CLINIC | Age: 50
End: 2020-10-13
Payer: OTHER MISCELLANEOUS

## 2020-10-13 DIAGNOSIS — Z47.89 AFTERCARE FOLLOWING SURGERY OF THE MUSCULOSKELETAL SYSTEM: ICD-10-CM

## 2020-10-13 DIAGNOSIS — S46.812A: ICD-10-CM

## 2020-10-13 PROCEDURE — 97110 THERAPEUTIC EXERCISES: CPT | Mod: GP | Performed by: PHYSICAL THERAPIST

## 2020-10-13 NOTE — PROGRESS NOTES
Subjective:  The history is provided by the patient. No  was used.     Physical Exam                    Objective:  System    Physical Exam    General     ROS    Assessment/Plan:    PROGRESS  REPORT    Progress reporting period is from 9/2020 to Oct 13 2020.       SUBJECTIVE  Subjective changes noted by patient:  Nick returns for his Last approved PT visit (24).  He feels more confident with using his arm at shoulder height.  He doesnt feel he is able to raise his arm above head any better.  Pt feels he is about 80% consistent with his HEP.  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   Changes in function:  Yes (See Goal flowsheet attached for changes in current functional level)  Adverse reaction to treatment or activity: None    OBJECTIVE  Changes noted in objective findings:  Yes, slow progression of strength/AROM/functional use of his L UE.  Objective: L shoulder 110 AROM, (114 AROM Flexion after Rx).    PROM Flex 160, ER 70, IR 70, Ext/IR L2  MMT IR 5/5, ER 4+/5, Scap 4/5  Posture - AROM cont with + shrug with elevation (due to weakness)    ASSESSMENT/PLAN  Updated problem list and treatment plan: Diagnosis 1:  S/p L RCR massive  Pain -  self management and education  Decreased strength - therapeutic exercise and therapeutic activities  Inflammation - self management/home program  Decreased function - home program  Impaired posture - neuro re-education and home program  STG/LTGs have been met or progress has been made towards goals:  Yes (See Goal flow sheet completed today.)  Assessment of Progress: The patient's progress has slowed.  Self Management Plans:  Patient has been instructed in a home treatment program.  Patient  has been instructed in self management of symptoms.  I have re-evaluated this patient and find that the nature, scope, duration and intensity of the therapy is appropriate for the medical  condition of the patient.  Thomas continues to require the following intervention to meet STG and LTG's:  Nick understands his HEP and as needed can follow up with PT every 2-4 weeks PRN.    Recommendations:  This patient would benefit from further evaluation with Dr Boyd with his planned appointment with him in 2 weeks.  Nick has gained a little more functional use of his arm, Nick reports being pleased with his current use of his L UE, he understands he is behind schedule with his AROM/Strength.  Nick also understands his surgery will certainly take a few more months of recovery to achieve maximal benefit post surgery, due to his size of tear, and delay for surgery due to COVID pandemic.  Nick is Indep with his HEP, progressing with supine elevation with blue tubing, and progressing with 2-4# weights with his RC/Scap strengthening program, and he is doing this strength program every other day, with daily PROM/AAROM.    Please refer to the daily flowsheet for treatment today, total treatment time and time spent performing 1:1 timed codes.

## 2020-10-27 ENCOUNTER — OFFICE VISIT (OUTPATIENT)
Dept: ORTHOPEDICS | Facility: CLINIC | Age: 50
End: 2020-10-27
Payer: OTHER MISCELLANEOUS

## 2020-10-27 VITALS
WEIGHT: 285 LBS | HEIGHT: 72 IN | DIASTOLIC BLOOD PRESSURE: 90 MMHG | SYSTOLIC BLOOD PRESSURE: 132 MMHG | BODY MASS INDEX: 38.6 KG/M2

## 2020-10-27 DIAGNOSIS — Z98.890 S/P ARTHROSCOPY OF LEFT SHOULDER: Primary | ICD-10-CM

## 2020-10-27 PROCEDURE — 99213 OFFICE O/P EST LOW 20 MIN: CPT | Performed by: ORTHOPAEDIC SURGERY

## 2020-10-27 ASSESSMENT — MIFFLIN-ST. JEOR: SCORE: 2180.75

## 2020-10-27 NOTE — LETTER
October 27th, 2020      Thomas Pierce  [address] [facility] [address]        To Whom It May Concern:    Thomas Pierce was seen in our clinic today. ABLE to work with the following restrictions: limited to light duty - for his left arm no pushing, pulling or carrying greater than 50lbs. No lifting greater than 10 lbs away from his body and ok to drive. He will follow up with my office in 6 weeks for reevaluation and progression of activities at that time.       Sincerely,          David Boyd MD

## 2020-10-27 NOTE — LETTER
10/27/2020         RE: Thomas Pierce  [address] [facility] [facility] [address]        Dear Colleague,    Thank you for referring your patient, Thomas Pierce, to the [facility] [place]. Please see a copy of my visit note below.    ORTHOPEDIC SURGERY POST-OPERATIVE VISIT NOTE    DATE OF SURGERY: 5/12/20    OPERATION PERFORMED: Left shoulder arthroscopic subscapularis supraspinatus, and infraspinatus rotator cuff repair, extensive labral debridement, release of adhesions of capsule and rotator cuff, subacromial decompression, and biceps tenodesis.     INTERVAL HISTORY: Thomas is a 50 year old, right-hand dominant male who returns today now 5+ months out from the above operation. Overall, Thomas reports that he is doing well overall, feels like he is still having slow progress with his motion and strength.  He states that he is very pleased and feels like he has his arm back.  He still notes that he has limitations with the left shoulder.  He feels somewhat limited in what he can do with the left shoulder at this point, but overall is happy with his progress.  He is able to sleep without issue.  His pain symptoms are relatively minimal and intermittent.  They are mostly related to his continued work with his therapies.  No intercurrent trauma. Denies any numbness, tingling, or weakness. No other issues or concerns.    EXAM:  Constitutional: Well-developed, well-nourished, healthy appearing male.  Skin: Warm, dry, and without rashes.   Cardiac: Well perfused extremities, strong 2+ peripheral pulses. No edema.   Pulmonary: Non-labored respirations on room air without audible wheezes.   Musculoskeletal: Focused examination of the left upper extremity demonstrates well-healed arthroscopic portal incisions.  No signs of infection.  Normal biceps contour with no Sean deformity.  Neurovascularly intact.  Left shoulder active range of motion with forward elevation to 110  degrees while seated, but forward elevation to 165 degrees when lying down, internal rotation to L2, external rotation at the side to 40 degrees.  5 out of 5 external rotation and internal rotation strength.  4+ out of 5 forward elevation strength.  Supine exam demonstrates 5 out of 5 forward elevation strength and scaption strength.  No Sean deformity.    IMAGING:   No new imaging obtained.    IMPRESSION: 50 year old-year-old right hand dominant male now 5+ months status-post the above. Overall doing well, but making slower progress than anticipated given his deconditioning of his rotator cuff musculature.      PLAN:   I had a nice discussion with Thomas today. His progress continues to be a bit slower than expected.  He is still combating residual stiffness specifically with abduction, external rotation and abduction, and internal rotation and abduction.  At this point I would like to renew his physical therapy and have him see a different therapist for a second set of eyes and opinions.  When I see if there are any exercises that we can incorporate that may improve his dynamic humeral head stability and help restore his force couple to initiate better active range of motion with active forward elevation overhead to facilitate further strengthening of his rotator cuff.    I also discussed this case with my senior partners and shoulder experts Dr. Juliocesar Warner and Dr. Marie Linares.  They recommend continued monitoring and continued physiotherapy to continue to strengthen up his cuff.  If he is still having significant weakness issues after the 6-month jim we may consider repeating the left shoulder MRI to evaluate whether or not he has sustained a left shoulder rotator cuff retear or failure of repair altogether.    Otherwise his work note was renewed today.  We will continue with the present level of restrictions.    Return to clinic in 6 weeks for repeat clinical examination.  If doing well, may  progress his work restrictions at that time.  If no improvement, and physical therapy having no significant effect, we may need to consider obtaining a left shoulder repeat MRI at that time to evaluate for possible retear or failure of the cuff repair to heal.    At the conclusion of the office visit, Thomas verbally acknowledged that I answered all of his questions satisfactorily.          Again, thank you for allowing me to participate in the care of your patient.        Sincerely,        David Boyd MD

## 2020-10-27 NOTE — PROGRESS NOTES
ORTHOPEDIC SURGERY POST-OPERATIVE VISIT NOTE    DATE OF SURGERY: 5/12/20    OPERATION PERFORMED: Left shoulder arthroscopic subscapularis supraspinatus, and infraspinatus rotator cuff repair, extensive labral debridement, release of adhesions of capsule and rotator cuff, subacromial decompression, and biceps tenodesis.     INTERVAL HISTORY: Thomas is a 50 year old, right-hand dominant male who returns today now 5+ months out from the above operation. Overall, Thomas reports that he is doing well overall, feels like he is still having slow progress with his motion and strength.  He states that he is very pleased and feels like he has his arm back.  He still notes that he has limitations with the left shoulder.  He feels somewhat limited in what he can do with the left shoulder at this point, but overall is happy with his progress.  He is able to sleep without issue.  His pain symptoms are relatively minimal and intermittent.  They are mostly related to his continued work with his therapies.  No intercurrent trauma. Denies any numbness, tingling, or weakness. No other issues or concerns.    EXAM:  Constitutional: Well-developed, well-nourished, healthy appearing male.  Skin: Warm, dry, and without rashes.   Cardiac: Well perfused extremities, strong 2+ peripheral pulses. No edema.   Pulmonary: Non-labored respirations on room air without audible wheezes.   Musculoskeletal: Focused examination of the left upper extremity demonstrates well-healed arthroscopic portal incisions.  No signs of infection.  Normal biceps contour with no Sean deformity.  Neurovascularly intact.  Left shoulder active range of motion with forward elevation to 110 degrees while seated, but forward elevation to 165 degrees when lying down, internal rotation to L2, external rotation at the side to 40 degrees.  5 out of 5 external rotation and internal rotation strength.  4+ out of 5 forward elevation strength.  Supine exam demonstrates  5 out of 5 forward elevation strength and scaption strength.  No Sean deformity.    IMAGING:   No new imaging obtained.    IMPRESSION: 50 year old-year-old right hand dominant male now 5+ months status-post the above. Overall doing well, but making slower progress than anticipated given his deconditioning of his rotator cuff musculature.      PLAN:   I had a nice discussion with Thomas today. His progress continues to be a bit slower than expected.  He is still combating residual stiffness specifically with abduction, external rotation and abduction, and internal rotation and abduction.  At this point I would like to renew his physical therapy and have him see a different therapist for a second set of eyes and opinions.  When I see if there are any exercises that we can incorporate that may improve his dynamic humeral head stability and help restore his force couple to initiate better active range of motion with active forward elevation overhead to facilitate further strengthening of his rotator cuff.    I also discussed this case with my senior partners and shoulder experts Dr. Juliocesar Warner and Dr. Marie Linares.  They recommend continued monitoring and continued physiotherapy to continue to strengthen up his cuff.  If he is still having significant weakness issues after the 6-month jim we may consider repeating the left shoulder MRI to evaluate whether or not he has sustained a left shoulder rotator cuff retear or failure of repair altogether.    Otherwise his work note was renewed today.  We will continue with the present level of restrictions.    Return to clinic in 6 weeks for repeat clinical examination.  If doing well, may progress his work restrictions at that time.  If no improvement, and physical therapy having no significant effect, we may need to consider obtaining a left shoulder repeat MRI at that time to evaluate for possible retear or failure of the cuff repair to heal.    At the  conclusion of the office visit, Thomas verbally acknowledged that I answered all of his questions satisfactorily.

## 2020-10-27 NOTE — PATIENT INSTRUCTIONS
1. S/P arthroscopy of left shoulder      Physical Therapy orders have been placed with [facility].  You can call [phone] to schedule at your convenience.   *Schedule with Diomedes Sarabia    Work letter provided     Follow up with Dr. Boyd on December 8th    Call my office with any questions or concerns, [phone].       Thomas to follow up with Primary Care provider regarding elevated blood pressure.

## 2020-11-02 ENCOUNTER — THERAPY VISIT (OUTPATIENT)
Dept: PHYSICAL THERAPY | Facility: CLINIC | Age: 50
End: 2020-11-02
Payer: OTHER MISCELLANEOUS

## 2020-11-02 DIAGNOSIS — Z47.89 AFTERCARE FOLLOWING SURGERY OF THE MUSCULOSKELETAL SYSTEM: ICD-10-CM

## 2020-11-02 DIAGNOSIS — M25.512 SHOULDER PAIN, LEFT: Primary | ICD-10-CM

## 2020-11-02 DIAGNOSIS — Z98.890 S/P ARTHROSCOPY OF LEFT SHOULDER: ICD-10-CM

## 2020-11-02 PROCEDURE — 97161 PT EVAL LOW COMPLEX 20 MIN: CPT | Mod: GP | Performed by: PHYSICAL THERAPIST

## 2020-11-02 PROCEDURE — 97110 THERAPEUTIC EXERCISES: CPT | Mod: GP | Performed by: PHYSICAL THERAPIST

## 2020-11-02 PROCEDURE — 97112 NEUROMUSCULAR REEDUCATION: CPT | Mod: GP | Performed by: PHYSICAL THERAPIST

## 2020-11-02 NOTE — PROGRESS NOTES
[facility] Initial Evaluation  Subjective:    Patient Health History  Thomas Pierce being seen for Shoulder PT.     Problem began: 2/24/2020.   Problem occurred: Slip and fall   Pain is reported as 0/10 on pain scale.  General health as reported by patient is fair.  Pertinent medical history includes: diabetes, high blood pressure and overweight.     Medical allergies: none.   Surgeries include:  Orthopedic surgery.    Current medications:  High blood pressure medication and other. Other medications details: Diabetes, cholesterol.    Current occupation is .   Primary job tasks include:  Driving, lifting/carrying, operating a machine/assembly and prolonged sitting.                  Therapist Generated HPI Evaluation  Problem details: Nick is being seen for a physical therapy second opinion by referral of Dr. Boyd due to slow progress in strength and function following left shoulder massive rotator cuff repair.  DOS: 5/12/2020..         Type of problem:  Left shoulder.    This is a new condition.    Where condition occurred: at work.  Patient reports pain:  Anterior and upper arm.  Pain is described as aching (3/10) and is intermittent.  Radiates to: front of shoulder and side. Pain is the same all the time.  Since onset symptoms are unchanged.  Associated with: weakness. Exacerbated by: reaching overhead and to 90 degrees, lifting.  Relieved by: good strength when supine.    Previous treatment includes physical therapy. There was moderate improvement following previous treatment.                          Objective:  Standing Alignment:      Shoulder/UE:  Rounded shoulders                  Flexibility/Screens:   Positive screens:  Shoulder                             Shoulder Evaluation:  ROM:  AROM:    Flexion:  Left:  100        Abduction:  Left: 95       Internal Rotation:  Left:  NA      External Rotation:  Left:  50                    PROM:    Flexion:  Left:  170               Internal Rotation:  Left:  30    Right:  50  External Rotation:  Left:  50    Right:  100                    Strength:  : ER weakness more pronounced at 90 deg ABD.  Flexion: Left:3-/5   Pain:        Abduction:  Left: 3-/5  Pain:        Internal Rotation:  Left:5/5     Pain:      External Rotation:   Left:4/5     Pain:             Stability Testing:  not assessed      Special Tests:  not assessed      Palpation:  not assessed                                         General     ROS    Assessment/Plan:    Patient is a 50 year old male with left side shoulder complaints.    Patient has the following significant findings with corresponding treatment plan.                Diagnosis 1:  S/p Left Rotator Cuff Repair  Pain -  hot/cold therapy and home program  Decreased ROM/flexibility - manual therapy, therapeutic exercise, therapeutic activity and home program  Decreased strength - therapeutic exercise and therapeutic activities  Decreased function - therapeutic activities    Therapy Evaluation Codes:   1) History comprised of:   Personal factors that impact the plan of care:      Time since onset of symptoms.    Comorbidity factors that impact the plan of care are:      None.     Medications impacting care: None.  2) Examination of Body Systems comprised of:   Body structures and functions that impact the plan of care:      Shoulder.   Activity limitations that impact the plan of care are:      Bathing, Driving, Dressing, Lifting and reaching.  3) Clinical presentation characteristics are:   Stable/Uncomplicated.  4) Decision-Making    Low complexity using standardized patient assessment instrument and/or measureable assessment of functional outcome.  Cumulative Therapy Evaluation is: Low complexity.    Previous and current functional limitations:  (See Goal Flow Sheet for this information)    Short term and Long term goals: (See Goal Flow Sheet for this information)     Communication ability:  Patient appears to  be able to clearly communicate and understand verbal and written communication and follow directions correctly.  Treatment Explanation - The following has been discussed with the patient:   RX ordered/plan of care  Anticipated outcomes  Possible risks and side effects  This patient would benefit from PT intervention to resume normal activities.   Rehab potential is good.    Frequency:  1 X week, once daily  Duration:  for 6 weeks tapering to 2 x month for 2 months  Discharge Plan:  Achieve all LTG.  Independent in home treatment program.  Reach maximal therapeutic benefit.    Please refer to the daily flowsheet for treatment today, total treatment time and time spent performing 1:1 timed codes.

## 2020-11-07 ENCOUNTER — HEALTH MAINTENANCE LETTER (OUTPATIENT)
Age: 50
End: 2020-11-07

## 2020-11-07 DIAGNOSIS — I10 HYPERTENSION GOAL BP (BLOOD PRESSURE) < 140/90: ICD-10-CM

## 2020-11-09 ENCOUNTER — THERAPY VISIT (OUTPATIENT)
Dept: PHYSICAL THERAPY | Facility: CLINIC | Age: 50
End: 2020-11-09
Payer: OTHER MISCELLANEOUS

## 2020-11-09 DIAGNOSIS — Z47.89 AFTERCARE FOLLOWING SURGERY OF THE MUSCULOSKELETAL SYSTEM: ICD-10-CM

## 2020-11-09 DIAGNOSIS — Z98.890 S/P ARTHROSCOPY OF LEFT SHOULDER: ICD-10-CM

## 2020-11-09 PROCEDURE — 97110 THERAPEUTIC EXERCISES: CPT | Mod: GP | Performed by: PHYSICAL THERAPIST

## 2020-11-09 PROCEDURE — 97112 NEUROMUSCULAR REEDUCATION: CPT | Mod: GP | Performed by: PHYSICAL THERAPIST

## 2020-11-09 RX ORDER — CHLORTHALIDONE 25 MG/1
TABLET ORAL
Qty: 90 TABLET | Refills: 0 | Status: SHIPPED | OUTPATIENT
Start: 2020-11-09 | End: 2021-01-28

## 2020-11-09 RX ORDER — LISINOPRIL 40 MG/1
TABLET ORAL
Qty: 90 TABLET | Refills: 0 | Status: SHIPPED | OUTPATIENT
Start: 2020-11-09 | End: 2021-01-28

## 2020-11-09 NOTE — TELEPHONE ENCOUNTER
Routing refill request to provider for review/approval because:  Blood pressure above goal.  Em Nixon RN

## 2020-11-10 NOTE — TELEPHONE ENCOUNTER
MESSAGE LEFT FOR PATIENT TO CALL AND SCHEDULE APPOINTMENT FOR MED CHECK PRIOR TO NEXT REFILL  Julia Pro, PAKO

## 2020-11-17 ENCOUNTER — THERAPY VISIT (OUTPATIENT)
Dept: PHYSICAL THERAPY | Facility: CLINIC | Age: 50
End: 2020-11-17
Payer: OTHER MISCELLANEOUS

## 2020-11-17 DIAGNOSIS — Z47.89 AFTERCARE FOLLOWING SURGERY OF THE MUSCULOSKELETAL SYSTEM: ICD-10-CM

## 2020-11-17 DIAGNOSIS — Z98.890 S/P ARTHROSCOPY OF LEFT SHOULDER: ICD-10-CM

## 2020-11-17 PROCEDURE — 97110 THERAPEUTIC EXERCISES: CPT | Mod: GP | Performed by: PHYSICAL THERAPIST

## 2020-11-17 PROCEDURE — 97112 NEUROMUSCULAR REEDUCATION: CPT | Mod: GP | Performed by: PHYSICAL THERAPIST

## 2020-11-24 ENCOUNTER — THERAPY VISIT (OUTPATIENT)
Dept: PHYSICAL THERAPY | Facility: CLINIC | Age: 50
End: 2020-11-24
Payer: OTHER MISCELLANEOUS

## 2020-11-24 DIAGNOSIS — Z98.890 S/P ARTHROSCOPY OF LEFT SHOULDER: ICD-10-CM

## 2020-11-24 DIAGNOSIS — Z47.89 AFTERCARE FOLLOWING SURGERY OF THE MUSCULOSKELETAL SYSTEM: ICD-10-CM

## 2020-11-24 PROCEDURE — 97110 THERAPEUTIC EXERCISES: CPT | Mod: GP | Performed by: PHYSICAL THERAPIST

## 2020-11-24 PROCEDURE — 97112 NEUROMUSCULAR REEDUCATION: CPT | Mod: GP | Performed by: PHYSICAL THERAPIST

## 2020-12-04 ENCOUNTER — THERAPY VISIT (OUTPATIENT)
Dept: PHYSICAL THERAPY | Facility: CLINIC | Age: 50
End: 2020-12-04
Payer: OTHER MISCELLANEOUS

## 2020-12-04 DIAGNOSIS — Z98.890 S/P ARTHROSCOPY OF LEFT SHOULDER: ICD-10-CM

## 2020-12-04 DIAGNOSIS — Z47.89 AFTERCARE FOLLOWING SURGERY OF THE MUSCULOSKELETAL SYSTEM: ICD-10-CM

## 2020-12-04 PROCEDURE — 97110 THERAPEUTIC EXERCISES: CPT | Mod: GP | Performed by: PHYSICAL THERAPIST

## 2020-12-04 PROCEDURE — 97112 NEUROMUSCULAR REEDUCATION: CPT | Mod: GP | Performed by: PHYSICAL THERAPIST

## 2020-12-08 ENCOUNTER — OFFICE VISIT (OUTPATIENT)
Dept: ORTHOPEDICS | Facility: CLINIC | Age: 50
End: 2020-12-08
Payer: OTHER MISCELLANEOUS

## 2020-12-08 ENCOUNTER — ANCILLARY PROCEDURE (OUTPATIENT)
Dept: GENERAL RADIOLOGY | Facility: CLINIC | Age: 50
End: 2020-12-08
Attending: ORTHOPAEDIC SURGERY
Payer: OTHER MISCELLANEOUS

## 2020-12-08 VITALS
BODY MASS INDEX: 38.6 KG/M2 | WEIGHT: 285 LBS | HEIGHT: 72 IN | SYSTOLIC BLOOD PRESSURE: 120 MMHG | DIASTOLIC BLOOD PRESSURE: 78 MMHG

## 2020-12-08 DIAGNOSIS — Z98.890 S/P ARTHROSCOPY OF LEFT SHOULDER: ICD-10-CM

## 2020-12-08 DIAGNOSIS — Z98.890 S/P ARTHROSCOPY OF LEFT SHOULDER: Primary | ICD-10-CM

## 2020-12-08 PROCEDURE — 73030 X-RAY EXAM OF SHOULDER: CPT | Mod: LT | Performed by: RADIOLOGY

## 2020-12-08 PROCEDURE — 99213 OFFICE O/P EST LOW 20 MIN: CPT | Performed by: ORTHOPAEDIC SURGERY

## 2020-12-08 ASSESSMENT — MIFFLIN-ST. JEOR: SCORE: 2180.75

## 2020-12-08 NOTE — PATIENT INSTRUCTIONS
1. S/P arthroscopy of left shoulder      The [facility] team will call you to schedule your imaging exam in the next 1-2 days. You can also call them directly at [facility] [phone] ([address] [facility], [address], [place], [place]) to schedule your appointment.   MRI / MRI Arthrogram       Work letter renewed     Follow up with Dr. Boyd 1-2 days after completing the MRI    Call my office with any questions or concerns, [phone].

## 2020-12-08 NOTE — LETTER
December 8th, 2020      Thomas Pierce  [address] [address]        To Whom It May Concern:    Thomas Pierce was seen in our clinic today. ABLE to work with the following restrictions: limited to light duty - for his left arm no pushing, pulling or carrying greater than 50lbs. No lifting greater than 10 lbs away from his body and ok to drive. 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     He will follow up with my office in about 2 weeks for reevaluation and to review MRI results.       Sincerely,          David Boyd MD

## 2020-12-08 NOTE — LETTER
12/8/2020         RE: Thomas Pierce  [address] [facility] [address]        Dear Colleague,    Thank you for referring your patient, Thomas Pierce, to the [facility] [place]. Please see a copy of my visit note below.    ORTHOPEDIC SURGERY POST-OPERATIVE VISIT NOTE    DATE OF SURGERY: 5/12/20    OPERATION PERFORMED: Left shoulder arthroscopic subscapularis supraspinatus, and infraspinatus rotator cuff repair, extensive labral debridement, release of adhesions of capsule and rotator cuff, subacromial decompression, and biceps tenodesis.     INTERVAL HISTORY: Thomas is a 50 year old, right-hand dominant male who returns today now 7 months out from the above operation. Overall, Thomas reports that he is doing well. Feels like he is getting stronger but it is really really slow.  He states that he is returned to work as we previously outlined.  Back unrestricted, but feels that work is going well.  Overall he is very happy with his arm, but he does wish that he had better strength in space and away from the body and overhead.  No intercurrent trauma. Denies any numbness, tingling, or weakness. No other issues or concerns.    REVIEW OF SYSTEMS:  General: Denies fevers, chills, or night sweats.  Skin: Denies rashes or lesions.  Head: Denies headache or dizziness.  Eyes: Denies vision changes or eye pain.  Ears: Denies ear pain or decreased hearing.  Nose: Denies nose bleeding or sinus pain.  Mouth & Throat: Denies bleeding gums or sore throat.  Neck: Denies neck pain or stiffness.  Respiratory: Denies cough, wheezing, sob, or hemoptysis.  Cardiovascular: Denies chest pain, chest pressure, or palpitations.   Gastrointestinal: Denies abdominal pain, nausea, vomiting, diarrhea, or constipation.  Genitourinary: Denies difficulty or pain with urination.   Musculoskeletal: As noted above in the HPI, otherwise normal.  Neuro: Denies paralysis, weakness, paresthesias,  or speech difficulty.  Lymphatics: Denies lymphadenopathy.  Psych: Denies anxiety, sadness, or irritability.    EXAM:  Constitutional: Well-developed, well-nourished, healthy appearing male.  Skin: Warm, dry, and without rashes.   Cardiac: Well perfused extremities, strong 2+ peripheral pulses. No edema.   Pulmonary: Non-labored respirations on room air without audible wheezes.   Musculoskeletal: Focused examination of the left upper extremity demonstrates well-healed arthroscopic portal incisions.  No signs of infection.  Normal biceps contour with no Sean deformity.  Neurovascularly intact.  Left shoulder active range of motion with forward elevation to 110 degrees, abduction 80 degrees, external rotation at the side to 45 degrees, internal rotation of the back to L2.  Normal belly press and lift off.  5- out of 5 strength with external rotation strength testing at the side.  4 out of 5 strength with scaption isolation of his supraspinatus muscle with manual muscle testing.  Passive range of motion with forward elevation limited to 150 degrees, external rotation is limited to 45 degrees, abduction is limited to 80 degrees.  Terminal stiffness remains a significant issue for his range of motion.     IMAGING:   Repeat left shoulder 4 view radiographs taken today demonstrate changes consistent with a prior rotator cuff repair, however there is no proximal humeral migration.  No significant glenohumeral joint arthrosis.  Mild stable AC joint arthrosis.    IMPRESSION: 50 year old-year-old right hand dominant male now 7 months status-post the above. Overall doing well, but making slower progress than anticipated given his persistent inability to recover his rotator cuff strength.    PLAN:   I had a nice discussion with Thomas today.     I also explained that I discussed his case in detail with his physical therapist, Diomedes Sarabia.  Based on physical exam it does seem that his rotator cuff repair is holding up  however he is still having difficulty restoring active forward elevation.  I believe that this is likely a combination of persistent shoulder stiffness with a lack of ability to strengthen in the terminal arc of motion.  However rotator cuff retear or failure to heal cannot be completely ruled out at this time.  I explained this to the patient in detail.      Furthermore I discussed this case with my senior shoulder surgeon partner, Dr. Juliocesar Warner.  He agrees that an MRI should be considered at this time.  He recommended an MR arthrogram.  I am in complete agreement and would recommend now that we are over 6 months out from surgery that we perform a left shoulder gadolinium enhanced MR arthrogram of the left shoulder to evaluate for any local fenestrations, failure of rotator cuff tendons to heal, or massive rotator cuff retear.     This will help inform her discussion moving forward with regards to further work restrictions, and ability to continue his physical therapy, versus consideration for any potential surgical procedures. Patient is in agreement.    Otherwise his work note was renewed today, his current level of restrictions will continue, and we will continue the physical therapy to continue to attempt to strengthen and regain terminal flexibility.      Return to clinic after the MRI scan of the left shoulder.    At the conclusion of the office visit, Thomas verbally acknowledged that I answered all of his questions satisfactorily.      Again, thank you for allowing me to participate in the care of your patient.        Sincerely,        David Boyd MD

## 2020-12-08 NOTE — PROGRESS NOTES
ORTHOPEDIC SURGERY POST-OPERATIVE VISIT NOTE    DATE OF SURGERY: 5/12/20    OPERATION PERFORMED: Left shoulder arthroscopic subscapularis supraspinatus, and infraspinatus rotator cuff repair, extensive labral debridement, release of adhesions of capsule and rotator cuff, subacromial decompression, and biceps tenodesis.     INTERVAL HISTORY: Thomas is a 50 year old, right-hand dominant male who returns today now 7 months out from the above operation. Overall, Thomas reports that he is doing well. Feels like he is getting stronger but it is really really slow.  He states that he is returned to work as we previously outlined.  Back unrestricted, but feels that work is going well.  Overall he is very happy with his arm, but he does wish that he had better strength in space and away from the body and overhead.  No intercurrent trauma. Denies any numbness, tingling, or weakness. No other issues or concerns.    REVIEW OF SYSTEMS:  General: Denies fevers, chills, or night sweats.  Skin: Denies rashes or lesions.  Head: Denies headache or dizziness.  Eyes: Denies vision changes or eye pain.  Ears: Denies ear pain or decreased hearing.  Nose: Denies nose bleeding or sinus pain.  Mouth & Throat: Denies bleeding gums or sore throat.  Neck: Denies neck pain or stiffness.  Respiratory: Denies cough, wheezing, sob, or hemoptysis.  Cardiovascular: Denies chest pain, chest pressure, or palpitations.   Gastrointestinal: Denies abdominal pain, nausea, vomiting, diarrhea, or constipation.  Genitourinary: Denies difficulty or pain with urination.   Musculoskeletal: As noted above in the HPI, otherwise normal.  Neuro: Denies paralysis, weakness, paresthesias, or speech difficulty.  Lymphatics: Denies lymphadenopathy.  Psych: Denies anxiety, sadness, or irritability.    EXAM:  Constitutional: Well-developed, well-nourished, healthy appearing male.  Skin: Warm, dry, and without rashes.   Cardiac: Well perfused extremities, strong  2+ peripheral pulses. No edema.   Pulmonary: Non-labored respirations on room air without audible wheezes.   Musculoskeletal: Focused examination of the left upper extremity demonstrates well-healed arthroscopic portal incisions.  No signs of infection.  Normal biceps contour with no Sean deformity.  Neurovascularly intact.  Left shoulder active range of motion with forward elevation to 110 degrees, abduction 80 degrees, external rotation at the side to 45 degrees, internal rotation of the back to L2.  Normal belly press and lift off.  5- out of 5 strength with external rotation strength testing at the side.  4 out of 5 strength with scaption isolation of his supraspinatus muscle with manual muscle testing.  Passive range of motion with forward elevation limited to 150 degrees, external rotation is limited to 45 degrees, abduction is limited to 80 degrees.  Terminal stiffness remains a significant issue for his range of motion.     IMAGING:   Repeat left shoulder 4 view radiographs taken today demonstrate changes consistent with a prior rotator cuff repair, however there is no proximal humeral migration.  No significant glenohumeral joint arthrosis.  Mild stable AC joint arthrosis.    IMPRESSION: 50 year old-year-old right hand dominant male now 7 months status-post the above. Overall doing well, but making slower progress than anticipated given his persistent inability to recover his rotator cuff strength.    PLAN:   I had a nice discussion with Thomas today.     I also explained that I discussed his case in detail with his physical therapist, Diomedes Sarabia.  Based on physical exam it does seem that his rotator cuff repair is holding up however he is still having difficulty restoring active forward elevation.  I believe that this is likely a combination of persistent shoulder stiffness with a lack of ability to strengthen in the terminal arc of motion.  However rotator cuff retear or failure to heal cannot  be completely ruled out at this time.  I explained this to the patient in detail.      Furthermore I discussed this case with my senior shoulder surgeon partner, Dr. Juliocesar Warner.  He agrees that an MRI should be considered at this time.  He recommended an MR arthrogram.  I am in complete agreement and would recommend now that we are over 6 months out from surgery that we perform a left shoulder gadolinium enhanced MR arthrogram of the left shoulder to evaluate for any local fenestrations, failure of rotator cuff tendons to heal, or massive rotator cuff retear.     This will help inform her discussion moving forward with regards to further work restrictions, and ability to continue his physical therapy, versus consideration for any potential surgical procedures. Patient is in agreement.    Otherwise his work note was renewed today, his current level of restrictions will continue, and we will continue the physical therapy to continue to attempt to strengthen and regain terminal flexibility.      Return to clinic after the MRI scan of the left shoulder.    At the conclusion of the office visit, Thomas verbally acknowledged that I answered all of his questions satisfactorily.

## 2020-12-10 DIAGNOSIS — Z11.59 ENCOUNTER FOR SCREENING FOR OTHER VIRAL DISEASES: Primary | ICD-10-CM

## 2020-12-10 PROBLEM — S46.812A TRAUMATIC TEAR OF SUPRASPINATUS TENDON OF LEFT SHOULDER: Status: RESOLVED | Noted: 2020-05-06 | Resolved: 2020-12-10

## 2020-12-12 ENCOUNTER — HOSPITAL ENCOUNTER (OUTPATIENT)
Dept: LAB | Facility: CLINIC | Age: 50
Discharge: HOME | End: 2020-12-12
Attending: FAMILY MEDICINE | Admitting: ORTHOPAEDIC SURGERY
Payer: COMMERCIAL

## 2020-12-12 DIAGNOSIS — Z11.59 ENCOUNTER FOR SCREENING FOR OTHER VIRAL DISEASES: ICD-10-CM

## 2020-12-12 LAB
SARS-COV-2 RNA SPEC QL NAA+PROBE: NOT DETECTED
SPECIMEN SOURCE: NORMAL

## 2020-12-12 PROCEDURE — U0003 INFECTIOUS AGENT DETECTION BY NUCLEIC ACID (DNA OR RNA); SEVERE ACUTE RESPIRATORY SYNDROME CORONAVIRUS 2 (SARS-COV-2) (CORONAVIRUS DISEASE [COVID-19]), AMPLIFIED PROBE TECHNIQUE, MAKING USE OF HIGH THROUGHPUT TECHNOLOGIES AS DESCRIBED BY CMS-2020-01-R: HCPCS | Performed by: ORTHOPAEDIC SURGERY

## 2020-12-16 ENCOUNTER — HOSPITAL ENCOUNTER (OUTPATIENT)
Dept: MRI IMAGING | Facility: CLINIC | Age: 50
Discharge: HOME | End: 2020-12-16
Attending: ORTHOPAEDIC SURGERY | Admitting: ORTHOPAEDIC SURGERY
Payer: OTHER MISCELLANEOUS

## 2020-12-16 ENCOUNTER — HOSPITAL ENCOUNTER (OUTPATIENT)
Dept: GENERAL RADIOLOGY | Facility: CLINIC | Age: 50
Discharge: HOME | End: 2020-12-16
Attending: ORTHOPAEDIC SURGERY | Admitting: ORTHOPAEDIC SURGERY
Payer: OTHER MISCELLANEOUS

## 2020-12-16 VITALS — HEART RATE: 85 BPM | SYSTOLIC BLOOD PRESSURE: 135 MMHG | DIASTOLIC BLOOD PRESSURE: 98 MMHG

## 2020-12-16 DIAGNOSIS — Z98.890 S/P ARTHROSCOPY OF LEFT SHOULDER: ICD-10-CM

## 2020-12-16 PROCEDURE — 250N000009 HC RX 250

## 2020-12-16 PROCEDURE — 73222 MRI JOINT UPR EXTREM W/DYE: CPT | Mod: LT

## 2020-12-16 PROCEDURE — 255N000002 HC RX 255 OP 636: Performed by: PHYSICIAN ASSISTANT

## 2020-12-16 PROCEDURE — A9585 GADOBUTROL INJECTION: HCPCS | Performed by: PHYSICIAN ASSISTANT

## 2020-12-16 PROCEDURE — 23350 INJECTION FOR SHOULDER X-RAY: CPT

## 2020-12-16 PROCEDURE — 250N000011 HC RX IP 250 OP 636

## 2020-12-16 RX ORDER — LIDOCAINE HYDROCHLORIDE 10 MG/ML
INJECTION, SOLUTION EPIDURAL; INFILTRATION; INTRACAUDAL; PERINEURAL
Status: COMPLETED
Start: 2020-12-16 | End: 2020-12-16

## 2020-12-16 RX ORDER — EPINEPHRINE 1 MG/ML(1)
AMPUL (ML) INJECTION
Status: COMPLETED
Start: 2020-12-16 | End: 2020-12-16

## 2020-12-16 RX ORDER — LIDOCAINE HYDROCHLORIDE 10 MG/ML
5 INJECTION, SOLUTION EPIDURAL; INFILTRATION; INTRACAUDAL; PERINEURAL ONCE
Status: COMPLETED | OUTPATIENT
Start: 2020-12-16 | End: 2020-12-16

## 2020-12-16 RX ORDER — GADOBUTROL 604.72 MG/ML
0.1 INJECTION INTRAVENOUS ONCE
Status: COMPLETED | OUTPATIENT
Start: 2020-12-16 | End: 2020-12-16

## 2020-12-16 RX ORDER — EPINEPHRINE 1 MG/ML
0.1 INJECTION, SOLUTION, CONCENTRATE INTRAVENOUS ONCE
Status: COMPLETED | OUTPATIENT
Start: 2020-12-16 | End: 2020-12-16

## 2020-12-16 RX ORDER — IOPAMIDOL 408 MG/ML
10 INJECTION, SOLUTION INTRATHECAL ONCE
Status: COMPLETED | OUTPATIENT
Start: 2020-12-16 | End: 2020-12-16

## 2020-12-16 RX ADMIN — EPINEPHRINE 0.1 MG: 1 INJECTION, SOLUTION, CONCENTRATE INTRAVENOUS at 11:07

## 2020-12-16 RX ADMIN — GADOBUTROL 0.1 ML: 604.72 INJECTION INTRAVENOUS at 11:08

## 2020-12-16 RX ADMIN — IOPAMIDOL 1 ML: 408 INJECTION, SOLUTION INTRATHECAL at 11:06

## 2020-12-16 RX ADMIN — LIDOCAINE HYDROCHLORIDE 3 ML: 10 INJECTION, SOLUTION EPIDURAL; INFILTRATION; INTRACAUDAL; PERINEURAL at 11:05

## 2020-12-16 NOTE — PROGRESS NOTES
RADIOLOGY PROCEDURE NOTE  Patient name: Thomas Pierce  MRN: [id number]  : 1970    Pre-procedure diagnosis: Left shoulder pain  Post-procedure diagnosis: Same    Procedure Date/Time: 2020  11:11 AM  Procedure: Left shoulder amarjit injection  Estimated blood loss: None  Specimen(s) collected with description: none  The patient tolerated the procedure well with no immediate complications.  Significant findings:none    See imaging dictation for procedural details.    Provider name: Aiden Vu PA-C  Assistant(s):None

## 2020-12-16 NOTE — PROGRESS NOTES
Pt was in Radiology today for a left shoulder gadolinium injection. Pt tolerated ortho procedure well. Procedure was completed by MONSERRAT NEGRETE. There were no complications during this procedure. Pt verbalized understanding of written and verbal instructions and left department in stable and satisfactory condition. There is no evidence of bleeding or any other complications upon discharge.

## 2020-12-17 ENCOUNTER — THERAPY VISIT (OUTPATIENT)
Dept: PHYSICAL THERAPY | Facility: CLINIC | Age: 50
End: 2020-12-17
Payer: OTHER MISCELLANEOUS

## 2020-12-17 DIAGNOSIS — Z98.890 S/P ARTHROSCOPY OF LEFT SHOULDER: ICD-10-CM

## 2020-12-17 DIAGNOSIS — Z47.89 AFTERCARE FOLLOWING SURGERY OF THE MUSCULOSKELETAL SYSTEM: ICD-10-CM

## 2020-12-17 PROCEDURE — 97112 NEUROMUSCULAR REEDUCATION: CPT | Mod: GP | Performed by: PHYSICAL THERAPIST

## 2020-12-17 PROCEDURE — 97110 THERAPEUTIC EXERCISES: CPT | Mod: GP | Performed by: PHYSICAL THERAPIST

## 2020-12-22 ENCOUNTER — MYC MEDICAL ADVICE (OUTPATIENT)
Dept: ORTHOPEDICS | Facility: CLINIC | Age: 50
End: 2020-12-22

## 2020-12-22 ENCOUNTER — TELEPHONE (OUTPATIENT)
Dept: ORTHOPEDICS | Facility: CLINIC | Age: 50
End: 2020-12-22

## 2020-12-22 ENCOUNTER — OFFICE VISIT (OUTPATIENT)
Dept: ORTHOPEDICS | Facility: CLINIC | Age: 50
End: 2020-12-22
Payer: OTHER MISCELLANEOUS

## 2020-12-22 VITALS
DIASTOLIC BLOOD PRESSURE: 82 MMHG | SYSTOLIC BLOOD PRESSURE: 120 MMHG | HEIGHT: 72 IN | BODY MASS INDEX: 38.6 KG/M2 | WEIGHT: 285 LBS

## 2020-12-22 DIAGNOSIS — Z98.890 S/P ARTHROSCOPY OF LEFT SHOULDER: Primary | ICD-10-CM

## 2020-12-22 DIAGNOSIS — M75.122 COMPLETE TEAR OF LEFT ROTATOR CUFF, UNSPECIFIED WHETHER TRAUMATIC: Primary | ICD-10-CM

## 2020-12-22 PROCEDURE — 99213 OFFICE O/P EST LOW 20 MIN: CPT | Performed by: ORTHOPAEDIC SURGERY

## 2020-12-22 RX ORDER — IBUPROFEN 200 MG
400 TABLET ORAL
Status: CANCELLED | OUTPATIENT
Start: 2020-12-22

## 2020-12-22 RX ORDER — ACETAMINOPHEN 325 MG/1
975 TABLET ORAL ONCE
Status: CANCELLED | OUTPATIENT
Start: 2020-12-22 | End: 2020-12-22

## 2020-12-22 RX ORDER — GABAPENTIN 100 MG/1
600 CAPSULE ORAL ONCE
Status: CANCELLED | OUTPATIENT
Start: 2020-12-22

## 2020-12-22 ASSESSMENT — MIFFLIN-ST. JEOR: SCORE: 2180.75

## 2020-12-22 NOTE — LETTER
12/22/2020         RE: Thomas Pierce  [address] [address]        Dear Colleague,    Thank you for referring your patient, Thomas Pierce, to the [facility] [place]. Please see a copy of my visit note below.    ORTHOPEDIC SURGERY POST-OPERATIVE VISIT NOTE    DATE OF SURGERY: 5/12/20    OPERATION PERFORMED: Left shoulder arthroscopic subscapularis, supraspinatus, and infraspinatus rotator cuff repair, extensive labral debridement, release of adhesions of capsule and rotator cuff, subacromial decompression, and arthroscopic biceps tenodesis.     INTERVAL HISTORY: Thomas is a 50 year old, right-hand dominant male who returns today now 7 months out from the above operation. Overall, Thomas reports that he is doing well and is here to review his MRI.  No intercurrent trauma. Denies any numbness, tingling, or weakness. No other issues or concerns.    REVIEW OF SYSTEMS:  General: Denies fevers, chills, or night sweats.  Skin: Denies rashes or lesions.  Head: Denies headache or dizziness.  Eyes: Denies vision changes or eye pain.  Ears: Denies ear pain or decreased hearing.  Nose: Denies nose bleeding or sinus pain.  Mouth & Throat: Denies bleeding gums or sore throat.  Neck: Denies neck pain or stiffness.  Respiratory: Denies cough, wheezing, sob, or hemoptysis.  Cardiovascular: Denies chest pain, chest pressure, or palpitations.   Gastrointestinal: Denies abdominal pain, nausea, vomiting, diarrhea, or constipation.  Genitourinary: Denies difficulty or pain with urination.   Musculoskeletal: As noted above in the HPI, otherwise normal.  Neuro: Denies paralysis, weakness, paresthesias, or speech difficulty.  Lymphatics: Denies lymphadenopathy.  Psych: Denies anxiety, sadness, or irritability.    EXAM:  Constitutional: Well-developed, well-nourished, healthy appearing male.  Skin: Warm, dry, and without rashes.   Cardiac: Well perfused extremities,  strong 2+ peripheral pulses. No edema.   Pulmonary: Non-labored respirations on room air without audible wheezes.   Musculoskeletal: Focused examination of the left upper extremity demonstrates well-healed arthroscopic portal incisions.  No signs of infection.  Normal biceps contour with no Sean deformity.  Neurovascularly intact.  Left shoulder active range of motion with forward elevation to 140 degrees, abduction 90 degrees, external rotation at the side to 45 degrees, internal rotation of the back to T10.  Normal belly press and lift off.  5- out of 5 strength with external rotation strength testing at the side.  4 out of 5 strength with scaption isolation of his supraspinatus muscle with manual muscle testing.  In the supine position I measured his passive range of motion with a hand-held goniometer with forward elevation to 150 degrees, abduction to 90 degrees, external rotation and abduction to 45 degrees, internal rotation and abduction to 45 degrees, external rotation at the side to 60 degrees. Terminal stiffness remains a significant issue for his range of motion.  There is no tenderness to palpation about the AC joint and a negative cross body abduction test.    IMAGING:   Left shoulder MR arthrogram dated 12/16/2020 was personally reviewed by me.  This MRI demonstrates recurrent essentially full-thickness tearing of the anterior supraspinatus with dense scarring and adhesions of the supraspinatus to the undersurface of the acromion.  There appears to be healing of the infraspinatus posteriorly.  There also appears to be healing of the subscapularis tendon repair anteriorly.  The biceps tenodesis appears to be healed below the level of the groove.  There is a diminutive superior labrum consistent with prior degeneration.  There is mild grade II chondromalacia at the glenohumeral joint.  There is moderate AC joint arthrosis radiologically.  There is moderate grade 2 infraspinatus fatty atrophy and  infiltration, grade 3 fatty infiltration of the teres minor, grade 1 fatty infiltration of the supraspinatus, and grade 2 fatty replacement of the upper border of the subscapularis.    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     PLAN:   I had a nice discussion with Thomas today and I have reviewed this case in detail with my  Dr. Juliocesar Warner who is a shoulder specialist here at the [facility]. Dr. Warner is in complete agreement with the impression and plan as follows.     Given the current level of functional deficit specifically with strength and scaption and external rotation at the side, and given his MRI findings of recurrent supraspinatus tendon tear, his young age, and his desire to be physically active with his work and life; I do recommend a revision left shoulder arthroscopic rotator cuff repair with possible allograft augmentation versus superior capsular reconstruction, subacromial decompression, and possible debridement.    However, given this complicated scenario I also discussed all of the treatment options with Thomas today includin. Living with the symptoms.  2. Continued non-operative management.  3. Surgical intervention.     After going over these options, Thomas would like to proceed with left shoulder arthroscopy, subacromial decompression, revision rotator cuff repair with possible allograft augmentation and related procedures. We reviewed the risks and benefits of surgery including but not limited to the following: Risk of anesthesia, including death; infection; nerve/tendon/vessel injury; deep venous thrombosis (DVT), pulmonary embolism (PE); shoulder stiffness; rotator cuff repair non-healing or re-tear; hardware-  related problems; potential of rotator cuff repair irreparability (not able to repair the torn tendons), potential of the procedure to not alleviate the condition; and the potential need for further surgery in the future. We also discussed the possible use of biologic augmentation with a collagen scaffold or an allograft dermal (skin) graft depending on the tissue quality, tear size, and intraoperative determination of healing potential.     After going over these risks, benefits and alternatives Thomas would like to proceed with surgery and he will work with our surgical scheduling department to coordinate the surgery.    At the conclusion of the visit, Thomas verbally acknowledged that I answered all of his questions satisfactorily.      Again, thank you for allowing me to participate in the care of your patient.        Sincerely,        David Boyd MD

## 2020-12-22 NOTE — PATIENT INSTRUCTIONS
Updated work note     Reddy Carrillo [phone]    Follow up with Dr. Boyd post operatively    Call my office with any questions or concerns, [phone].

## 2020-12-22 NOTE — LETTER
December 22nd, 2020      Thomas Pierce  [address] [address]        To Whom It May Concern:    Thomas Pierce was seen in our clinic today. ABLE to work with the following restrictions: limited to light duty - for his left arm no pushing, pulling or carrying greater than 50lbs. No lifting greater than 10 lbs away from his body and ok to drive. 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     He will follow up with my office in about 3 months for reevaluation and updated work restrictions.       Sincerely,          David Boyd MD

## 2020-12-22 NOTE — LETTER
[facility]  [address]  [address]  [place] [address]  [phone]        December 28, 2020    Regarding:  Thomas Pierce  [address] [address]        To Mao Camarillo, MS, CRC, QRC:    I have been asked to comment on the possible cause or causes of Mr. Pierce's retearing of the rotator cuff as found on his recent MRI.  There was no clearly identified etiology for this recurrent rotator cuff tear following anatomic repair of the tendons at the time of surgery.  The patient did not report any recurrent trauma, injury, and displayed dutiful adherence to the standard of care postoperative physical therapy protocols and restrictions as they were laid out.  There were no specific surgical factors that could have predicted recurrent rotator cuff tear in this scenario.  It should be noted, that some inherent findings in the preoperative evaluation could have contributed to a recurrent rotator cuff tear, however none of these can clearly be stated as the cause of the recurrence of the tear in isolation.  Specifically, the patient did have mild to moderate supraspinatus and infraspinatus muscle atrophy noted on the preoperative MRI scan on 3/9/2020.  This degree of atrophy is a known risk factor for rotator cuff tendon retear following arthroscopic tendon repair, but cannot be blamed in isolation for the recurrence of his tear.  Also to note, the patient did demonstrate some mild chondromalacia and minimal cartilage wear at the time of surgery.  It is also known that coexisting cartilage wear and chondromalacia can also present as a risk factor for recurrent rotator cuff tear following rotator cuff repair.    In summary there is no clearly identifiable cause of the retearing of the rotator cuff tendons as was found on his recent MRI scan.    Please do not hesitate to contact me with further questions or concerns regarding the care of this  patient.      Sincerely,        David Boyd MD

## 2020-12-22 NOTE — PROGRESS NOTES
ORTHOPEDIC SURGERY POST-OPERATIVE VISIT NOTE    DATE OF SURGERY: 5/12/20    OPERATION PERFORMED: Left shoulder arthroscopic subscapularis, supraspinatus, and infraspinatus rotator cuff repair, extensive labral debridement, release of adhesions of capsule and rotator cuff, subacromial decompression, and arthroscopic biceps tenodesis.     INTERVAL HISTORY: Thomas is a 50 year old, right-hand dominant male who returns today now 7 months out from the above operation. Overall, Thomas reports that he is doing well and is here to review his MRI.  No intercurrent trauma. Denies any numbness, tingling, or weakness. No other issues or concerns.    REVIEW OF SYSTEMS:  General: Denies fevers, chills, or night sweats.  Skin: Denies rashes or lesions.  Head: Denies headache or dizziness.  Eyes: Denies vision changes or eye pain.  Ears: Denies ear pain or decreased hearing.  Nose: Denies nose bleeding or sinus pain.  Mouth & Throat: Denies bleeding gums or sore throat.  Neck: Denies neck pain or stiffness.  Respiratory: Denies cough, wheezing, sob, or hemoptysis.  Cardiovascular: Denies chest pain, chest pressure, or palpitations.   Gastrointestinal: Denies abdominal pain, nausea, vomiting, diarrhea, or constipation.  Genitourinary: Denies difficulty or pain with urination.   Musculoskeletal: As noted above in the HPI, otherwise normal.  Neuro: Denies paralysis, weakness, paresthesias, or speech difficulty.  Lymphatics: Denies lymphadenopathy.  Psych: Denies anxiety, sadness, or irritability.    EXAM:  Constitutional: Well-developed, well-nourished, healthy appearing male.  Skin: Warm, dry, and without rashes.   Cardiac: Well perfused extremities, strong 2+ peripheral pulses. No edema.   Pulmonary: Non-labored respirations on room air without audible wheezes.   Musculoskeletal: Focused examination of the left upper extremity demonstrates well-healed arthroscopic portal incisions.  No signs of infection.  Normal biceps  contour with no Sean deformity.  Neurovascularly intact.  Left shoulder active range of motion with forward elevation to 140 degrees, abduction 90 degrees, external rotation at the side to 45 degrees, internal rotation of the back to T10.  Normal belly press and lift off.  5- out of 5 strength with external rotation strength testing at the side.  4 out of 5 strength with scaption isolation of his supraspinatus muscle with manual muscle testing.  In the supine position I measured his passive range of motion with a hand-held goniometer with forward elevation to 150 degrees, abduction to 90 degrees, external rotation and abduction to 45 degrees, internal rotation and abduction to 45 degrees, external rotation at the side to 60 degrees. Terminal stiffness remains a significant issue for his range of motion.  There is no tenderness to palpation about the AC joint and a negative cross body abduction test.    IMAGING:   Left shoulder MR arthrogram dated 12/16/2020 was personally reviewed by me.  This MRI demonstrates recurrent essentially full-thickness tearing of the anterior supraspinatus with dense scarring and adhesions of the supraspinatus to the undersurface of the acromion.  There appears to be healing of the infraspinatus posteriorly.  There also appears to be healing of the subscapularis tendon repair anteriorly.  The biceps tenodesis appears to be healed below the level of the groove.  There is a diminutive superior labrum consistent with prior degeneration.  There is mild grade II chondromalacia at the glenohumeral joint.  There is moderate AC joint arthrosis radiologically.  There is moderate grade 2 infraspinatus fatty atrophy and infiltration, grade 3 fatty infiltration of the teres minor, grade 1 fatty infiltration of the supraspinatus, and grade 2 fatty replacement of the upper border of the subscapularis.    IMPRESSION: 50 year old-year-old right hand dominant male now 7 months status-post the above,  clinically plateauing with his progress with physical therapy persistent weakness away from the body and MRI findings consistent with a recurrent supraspinatus tendon tear.     PLAN:   I had a nice discussion with Thomas today and I have reviewed this case in detail with my  Dr. Juliocesar Warner who is a shoulder specialist here at the [facility]. Dr. Warner is in complete agreement with the impression and plan as follows.     Given the current level of functional deficit specifically with strength and scaption and external rotation at the side, and given his MRI findings of recurrent supraspinatus tendon tear, his young age, and his desire to be physically active with his work and life; I do recommend a revision left shoulder arthroscopic rotator cuff repair with possible allograft augmentation versus superior capsular reconstruction, subacromial decompression, and possible debridement.    However, given this complicated scenario I also discussed all of the treatment options with Thomas today includin. Living with the symptoms.  2. Continued non-operative management.  3. Surgical intervention.     After going over these options, Thomas would like to proceed with left shoulder arthroscopy, subacromial decompression, revision rotator cuff repair with possible allograft augmentation and related procedures. We reviewed the risks and benefits of surgery including but not limited to the following: Risk of anesthesia, including death; infection; nerve/tendon/vessel injury; deep venous thrombosis (DVT), pulmonary embolism (PE); shoulder stiffness; rotator cuff repair non-healing or re-tear; hardware- related problems; potential of rotator cuff repair irreparability (not able to repair the torn tendons), potential of the procedure to not alleviate the condition; and the potential need for further surgery in the future. We also discussed the possible use of biologic augmentation  with a collagen scaffold or an allograft dermal (skin) graft depending on the tissue quality, tear size, and intraoperative determination of healing potential.     After going over these risks, benefits and alternatives Thomas would like to proceed with surgery and he will work with our surgical scheduling department to coordinate the surgery.    At the conclusion of the visit, Thomas verbally acknowledged that I answered all of his questions satisfactorily.

## 2020-12-22 NOTE — TELEPHONE ENCOUNTER
Spoke to patient about scheduling surgery for ARTHROSCOPY, SHOULDER, WITH REVISION ROTATOR CUFF REPAIR, SUBACROMIAL DECOMPRESSION, POSSIBLE DEBRIDEMENT, POSSIBLE ROTATOR CUFF ALLOGRAFT RECONSTRUCTION.     Offered 12/30/20 for patient, but he is having some reservations due to work schedule and would like to move it to January 2021.     I'm working with the [facility] schedulers for next available surgery date.     Jessica/Marija-  This is a WC case. Can you get started on this?       Reddy Carrillo, Surgery Scheduler  [phone]

## 2020-12-23 NOTE — TELEPHONE ENCOUNTER
Message from Mao, regarding Thomas's WC injury. See My Chart message, 12/22/20 - 6:31 p.m.    Mao States the WC carrier is asking what may have caused the re-tear in the shoulder.  Please call back.  [phone]

## 2020-12-23 NOTE — TELEPHONE ENCOUNTER
Left message for Mao to fax written request/ questions to our office at [place]. Will obtain answers as able, and when provider is onsite.   Provided fax number for Mao to send request.     Dena Mayer, ATC

## 2020-12-29 NOTE — TELEPHONE ENCOUNTER
Checked in with patient, letting him know we are still working out details of surgery date.  Some challenges due to co-surgeon case, plus limited OR availability due to COVID restrictions.     I continue to work with Giselle Villanueva, surgery scheduler for Dr. Warner for date availability.    Reddy Carrillo, Surgery Scheduler

## 2020-12-30 DIAGNOSIS — Z11.59 ENCOUNTER FOR SCREENING FOR OTHER VIRAL DISEASES: Primary | ICD-10-CM

## 2020-12-30 PROBLEM — M75.122 COMPLETE TEAR OF LEFT ROTATOR CUFF, UNSPECIFIED WHETHER TRAUMATIC: Status: ACTIVE | Noted: 2020-12-30

## 2020-12-30 NOTE — TELEPHONE ENCOUNTER
Scheduled surgery.     Marija/Jessica.  This is a w/c case-  Now same day surgery at [facility]       ATC: please review and sign PT.  Not sure when PT should start, since it's a revision.     Type of surgery: ARTHROSCOPY, SHOULDER, WITH REVISION ROTATOR CUFF REPAIR, SUBACROMIAL DECOMPRESSION, POSSIBLE DEBRIDEMENT, POSSIBLE ROTATOR CUFF ALLOGRAFT RECONSTRUCTION  Location of surgery: [facility]  Date and time of surgery: 1/13/21 @ 205pm   Surgeon: Radames  Pre-Op Appt Date: 1/7/21  Post-Op Appt Date: 1/26/21   Packet sent out: Yes  Pre-cert/Authorization completed:  emailed Jessica  Date: 12/30/20    Reddy Carrillo Surgery Scheduler

## 2020-12-31 ENCOUNTER — THERAPY VISIT (OUTPATIENT)
Dept: PHYSICAL THERAPY | Facility: CLINIC | Age: 50
End: 2020-12-31
Payer: OTHER MISCELLANEOUS

## 2020-12-31 DIAGNOSIS — Z47.89 AFTERCARE FOLLOWING SURGERY OF THE MUSCULOSKELETAL SYSTEM: ICD-10-CM

## 2020-12-31 DIAGNOSIS — Z98.890 S/P ARTHROSCOPY OF LEFT SHOULDER: ICD-10-CM

## 2020-12-31 PROCEDURE — 97112 NEUROMUSCULAR REEDUCATION: CPT | Mod: GP | Performed by: PHYSICAL THERAPIST

## 2020-12-31 PROCEDURE — 97110 THERAPEUTIC EXERCISES: CPT | Mod: GP | Performed by: PHYSICAL THERAPIST

## 2020-12-31 PROCEDURE — 97530 THERAPEUTIC ACTIVITIES: CPT | Mod: GP | Performed by: PHYSICAL THERAPIST

## 2020-12-31 NOTE — PROGRESS NOTES
DISCHARGE REPORT    Progress reporting period is from 12/31/2020.       SUBJECTIVE  Nick returns to therapy today following his appointment with Dr. Boyd. MRI identified re-tear of the supraspinatus.  Tentatively scheduled for revision RC repair on 1/13/2021.    Current Pain level: 1/10.     Previous pain level was  3/10  .   Changes in function:  None  Adverse reaction to treatment or activity: None    OBJECTIVE  PROM:     ER = 60   IR = 50    AROM:   Positive for superior humeral glide and UT substitution with standing flexion and abduction    Strength:   Scaption = 3-/5   ER = 4+/5   IR = 5-/5    ASSESSMENT/PLAN  Updated problem list and treatment plan: Diagnosis 1:  S/p Left RCR  STG/LTGs have been met or progress has been made towards goals:  None  Assessment of Progress: The patient's condition is unchanged.  Self Management Plans:  Patient is independent in a home treatment program.  I have re-evaluated this patient and find that the nature, scope, duration and intensity of the therapy is appropriate for the medical condition of the patient.  Thomas continues to require the following intervention to meet STG and LTG's:  PT intervention is no longer required to meet STG/LTG.    Recommendations:  This patient is ready to be discharged from therapy and continue their home treatment program until he undergoes revision RCR, tentatively on 1/13/2021.  Will see Nick for post-op rehabilitation per protocol following revision RCR.    Please refer to the daily flowsheet for treatment today, total treatment time and time spent performing 1:1 timed codes.

## 2021-01-07 ENCOUNTER — OFFICE VISIT (OUTPATIENT)
Dept: FAMILY MEDICINE | Facility: CLINIC | Age: 51
End: 2021-01-07
Payer: OTHER MISCELLANEOUS

## 2021-01-07 VITALS
BODY MASS INDEX: 37.79 KG/M2 | HEART RATE: 102 BPM | HEIGHT: 72 IN | OXYGEN SATURATION: 98 % | SYSTOLIC BLOOD PRESSURE: 118 MMHG | DIASTOLIC BLOOD PRESSURE: 76 MMHG | TEMPERATURE: 96.5 F | WEIGHT: 279 LBS

## 2021-01-07 DIAGNOSIS — E78.5 HYPERLIPIDEMIA LDL GOAL <100: ICD-10-CM

## 2021-01-07 DIAGNOSIS — S49.92XD INJURY OF LEFT SHOULDER, SUBSEQUENT ENCOUNTER: ICD-10-CM

## 2021-01-07 DIAGNOSIS — I10 HYPERTENSION GOAL BP (BLOOD PRESSURE) < 140/90: ICD-10-CM

## 2021-01-07 DIAGNOSIS — Z98.890 S/P ARTHROSCOPY OF LEFT SHOULDER: ICD-10-CM

## 2021-01-07 DIAGNOSIS — Z01.818 PREOPERATIVE EXAMINATION: Primary | ICD-10-CM

## 2021-01-07 DIAGNOSIS — E11.9 TYPE 2 DIABETES MELLITUS WITHOUT COMPLICATION, WITHOUT LONG-TERM CURRENT USE OF INSULIN (H): ICD-10-CM

## 2021-01-07 LAB
ERYTHROCYTE [DISTWIDTH] IN BLOOD BY AUTOMATED COUNT: 12.3 % (ref 10–15)
HBA1C MFR BLD: 8.6 % (ref 0–5.6)
HCT VFR BLD AUTO: 45.4 % (ref 40–53)
HGB BLD-MCNC: 16.4 G/DL (ref 13.3–17.7)
MCH RBC QN AUTO: 30.1 PG (ref 26.5–33)
MCHC RBC AUTO-ENTMCNC: 36.1 G/DL (ref 31.5–36.5)
MCV RBC AUTO: 84 FL (ref 78–100)
PLATELET # BLD AUTO: 313 10E9/L (ref 150–450)
RBC # BLD AUTO: 5.44 10E12/L (ref 4.4–5.9)
WBC # BLD AUTO: 9.5 10E9/L (ref 4–11)

## 2021-01-07 PROCEDURE — 36415 COLL VENOUS BLD VENIPUNCTURE: CPT | Performed by: NURSE PRACTITIONER

## 2021-01-07 PROCEDURE — 85027 COMPLETE CBC AUTOMATED: CPT | Performed by: NURSE PRACTITIONER

## 2021-01-07 PROCEDURE — 80053 COMPREHEN METABOLIC PANEL: CPT | Performed by: NURSE PRACTITIONER

## 2021-01-07 PROCEDURE — 83036 HEMOGLOBIN GLYCOSYLATED A1C: CPT | Performed by: NURSE PRACTITIONER

## 2021-01-07 PROCEDURE — 99214 OFFICE O/P EST MOD 30 MIN: CPT | Performed by: NURSE PRACTITIONER

## 2021-01-07 ASSESSMENT — MIFFLIN-ST. JEOR: SCORE: 2153.54

## 2021-01-07 NOTE — PROGRESS NOTES
[address] [address]  Phone: [phone]  Primary Provider: [facility], [facility]  Pre-op Performing Provider: BELIA NELSON    PREOPERATIVE EVALUATION:  Today's date: 1/7/2021    hTomas Pierce is a 50 year old male who presents for a preoperative evaluation.    Surgical Information:  Surgery/Procedure: LEFT ARTHROSCOPY, SHOULDER, WITH REVISION ROTATOR CUFF REPAIR, SUBACROMIAL DECOMPRESSION, POSSIBLE DEBRIDEMENT, POSSIBLE ROTATOR CUFF ALLOGRAFT RECONSTRUCTION  Surgery Location: [facility]   Surgeon: David Boyd MD  Surgery Date: 1/13/2020  Time of Surgery: 2:05pm  Where patient plans to recover: At home with family  Fax number for surgical facility: Note does not need to be faxed, will be available electronically in Epic.     Type of Anesthesia Anticipated: Choice    Subjective     HPI related to upcoming procedure: revision of previous shoulder surgery.    Preop Questions 1/6/2021   1. Have you ever had a heart attack or stroke? No   2. Have you ever had surgery on your heart or blood vessels, such as a stent placement, a coronary artery bypass, or surgery on an artery in your head, neck, heart, or legs? No   3. Do you have chest pain with activity? No   4. Do you have a history of  heart failure? No   5. Do you currently have a cold, bronchitis or symptoms of other infection? No   6. Do you have a cough, shortness of breath, or wheezing? No   7. Do you or anyone in your family have previous history of blood clots? No   8. Do you or does anyone in your family have a serious bleeding problem such as prolonged bleeding following surgeries or cuts? No   9. Have you ever had problems with anemia or been told to take iron pills? No   10. Have you had any abnormal blood loss such as black, tarry or bloody stools? No   11. Have you ever had a blood transfusion? No   12. Are you willing to have a blood transfusion if it is  medically needed before, during, or after your surgery? Yes   13. Have you or any of your relatives ever had problems with anesthesia? No   14. Do you have sleep apnea, excessive snoring or daytime drowsiness? No   15. Do you have any artifical heart valves or other implanted medical devices like a pacemaker, defibrillator, or continuous glucose monitor? No   16. Do you have artificial joints? No   17. Are you allergic to latex? No       Health Care Directive:  Patient does not have a Health Care Directive or Living Will: Discussed advance care planning with patient; information given to patient to review.    Preoperative Review of :   reviewed - no record of controlled substances prescribed.      Status of Chronic Conditions:  DIABETES - Patient has a longstanding history of DiabetesType Type II . Patient is being treated with oral agents and denies significant side effects. Control has been fair. Complicating factors include but are not limited to: hypertension and hyperlipidemia.     HYPERLIPIDEMIA - Patient has a long history of significant Hyperlipidemia requiring medication for treatment with recent good control. Patient reports no problems or side effects with the medication.     HYPERTENSION - Patient has longstanding history of HTN , currently denies any symptoms referable to elevated blood pressure. Specifically denies chest pain, palpitations, dyspnea, orthopnea, PND or peripheral edema. Blood pressure readings have been in normal range. Current medication regimen is as listed below. Patient denies any side effects of medication.       Review of Systems  CONSTITUTIONAL: NEGATIVE for fever, chills, change in weight  INTEGUMENTARY/SKIN: NEGATIVE for worrisome rashes, moles or lesions  EYES: NEGATIVE for vision changes or irritation  ENT/MOUTH: NEGATIVE for ear, mouth and throat problems  RESP: NEGATIVE for significant cough or SOB  BREAST: NEGATIVE for masses, tenderness or discharge  CV: NEGATIVE for  chest pain, palpitations or peripheral edema  GI: NEGATIVE for nausea, abdominal pain, heartburn, or change in bowel habits  : NEGATIVE for frequency, dysuria, or hematuria  MUSCULOSKELETAL: NEGATIVE for significant arthralgias or myalgia  NEURO: NEGATIVE for weakness, dizziness or paresthesias  ENDOCRINE: NEGATIVE for temperature intolerance, skin/hair changes  HEME: NEGATIVE for bleeding problems  PSYCHIATRIC: NEGATIVE for changes in mood or affect    Patient Active Problem List    Diagnosis Date Noted     Complete tear of left rotator cuff, unspecified whether traumatic 12/30/2020     Priority: Medium     Added automatically from request for surgery [id number]       S/P arthroscopy of left shoulder 11/02/2020     Priority: Medium     Aftercare following surgery of the musculoskeletal system 05/19/2020     Priority: Medium     Uncontrolled type 2 diabetes mellitus with hyperglycemia (H) 05/07/2020     Priority: Medium     Shoulder pain, left 03/04/2020     Priority: Medium     Hyperlipidemia LDL goal <100 10/25/2018     Priority: Medium     Type 2 diabetes mellitus without complication, without long-term current use of insulin (H) 10/16/2018     Priority: Medium     Severe obesity (BMI 35.0-39.9) with comorbidity (H) 10/30/2017     Priority: Medium     Hypertension goal BP (blood pressure) < 140/90 10/20/2011     Priority: Medium      Past Medical History:   Diagnosis Date     Hyperlipidemia LDL goal <160 10/20/2011     Hypertension goal BP (blood pressure) < 140/90 10/20/2011     Prediabetes      Past Surgical History:   Procedure Laterality Date     ARTHROSCOPY SHOULDER ROTATOR CUFF REPAIR, BICEP TENODESIS REPAIR Left 5/12/2020    Procedure: LEFT SHOULDER ARTHROSCOPIC ROTATOR CUFF REPAIR, BICEPS TENODESIS, SUBACROMIAL DECOMPRESSION,  DEBRIDEMENT;  Surgeon: David Boyd MD;  Location: [facility] OR     MANDIBLE SURGERY  1978    trauma while sledding     Current Outpatient Medications   Medication Sig Dispense  Refill     aspirin 81 MG tablet Take 1 tablet by mouth daily.       blood glucose monitoring ([employer]) lancets Use to test blood sugar 1-2 times daily. 100 each 6     blood glucose monitoring (NO BRAND SPECIFIED) test strip Use to test blood sugars 2-4 times daily or as directed 100 strip 3     chlorthalidone (HYGROTON) 25 MG tablet TAKE 1 TABLET(25 MG) BY MOUTH DAILY 90 tablet 0     CONTOUR NEXT TEST test strip Use to test blood sugar 1-2 times daily. 100 strip 3     gabapentin (NEURONTIN) 300 MG capsule Take 1 capsule (300 mg) by mouth 3 times daily for 3 days 9 capsule 0     lisinopril (ZESTRIL) 40 MG tablet TAKE 1 TABLET(40 MG) BY MOUTH DAILY 90 tablet 0     metFORMIN (GLUCOPHAGE-XR) 500 MG 24 hr tablet Take 2 tablets (1,000 mg) by mouth At Bedtime for 7 days, THEN 3 tablets (1,500 mg) At Bedtime for 7 days, THEN 4 tablets (2,000 mg) At Bedtime. 360 tablet 1     rosuvastatin (CRESTOR) 20 MG tablet Take 1 tablet (20 mg) by mouth At Bedtime 90 tablet 1       No Known Allergies     Social History     Tobacco Use     Smoking status: Never Smoker     Smokeless tobacco: Never Used   Substance Use Topics     Alcohol use: Yes     Comment: occasionally      Family History   Problem Relation Age of Onset     Diabetes Mother         Type II     Hypertension Mother      Hyperlipidemia Mother      Cardiovascular Maternal Grandmother         PAD     Hepatitis Maternal Grandmother      Colon Polyps Cousin      Ovarian Cancer Maternal Aunt      Colon Cancer No family hx of      Prostate Cancer No family hx of      Coronary Artery Disease No family hx of      Cerebrovascular Disease No family hx of      History   Drug Use No         Objective     /76   Pulse 102   Temp 96.5  F (35.8  C) (Tympanic)   Ht 1.829 m (6')   Wt 126.6 kg (279 lb)   SpO2 98%   BMI 37.84 kg/m      Physical Exam    GENERAL APPEARANCE: healthy, alert and no distress     EYES: EOMI,  PERRL     HENT: ear canals and TM's normal and nose and  mouth without ulcers or lesions     NECK: no adenopathy, no asymmetry, masses, or scars and thyroid normal to palpation     RESP: lungs clear to auscultation - no rales, rhonchi or wheezes     CV: regular rates and rhythm, normal S1 S2, no S3 or S4 and no murmur, click or rub     ABDOMEN:  soft, nontender, no HSM or masses and bowel sounds normal     MS: extremities normal- no gross deformities noted, no evidence of inflammation in joints, FROM in all extremities.     SKIN: no suspicious lesions or rashes     NEURO: Normal strength and tone, sensory exam grossly normal, mentation intact and speech normal     PSYCH: mentation appears normal. and affect normal/bright     LYMPHATICS: No cervical adenopathy    Recent Labs   Lab Test 05/07/20 0923 11/04/19  1747   HGB 16.1  --      --     137   POTASSIUM 3.6 3.7   CR 0.83 0.80   A1C 8.3* 6.4*        Diagnostics:  Recent Results (from the past 168 hour(s))   CBC with platelets    Collection Time: 01/07/21  5:39 PM   Result Value Ref Range    WBC 9.5 4.0 - 11.0 10e9/L    RBC Count 5.44 4.4 - 5.9 10e12/L    Hemoglobin 16.4 13.3 - 17.7 g/dL    Hematocrit 45.4 40.0 - 53.0 %    MCV 84 78 - 100 fl    MCH 30.1 26.5 - 33.0 pg    MCHC 36.1 31.5 - 36.5 g/dL    RDW 12.3 10.0 - 15.0 %    Platelet Count 313 150 - 450 10e9/L   Hemoglobin A1c    Collection Time: 01/07/21  5:39 PM   Result Value Ref Range    Hemoglobin A1C 8.6 (H) 0 - 5.6 %      No EKG required, no history of coronary heart disease, significant arrhythmia, peripheral arterial disease or other structural heart disease.    Previous EKG reviewed-stated A flutter but this was a mis read. NSR.    Revised Cardiac Risk Index (RCRI):  The patient has the following serious cardiovascular risks for perioperative complications:   - No serious cardiac risks = 0 points     RCRI Interpretation: 0 points: Class I (very low risk - 0.4% complication rate)           Assessment & Plan   The proposed surgical procedure is  considered INTERMEDIATE risk.    Preoperative examination  A1c checked and in appropriate range to proceed with surgery. Discussed working on diet and medication compliance.   - Comprehensive metabolic panel (BMP + Alb, Alk Phos, ALT, AST, Total. Bili, TP)  - CBC with platelets  - Hemoglobin A1c    Type 2 diabetes mellitus without complication, without long-term current use of insulin (H)  In adequate control.    Hyperlipidemia LDL goal <100       Injury of left shoulder, subsequent encounter    S/P arthroscopy of left shoulder    Hypertension goal BP (blood pressure) < 140/90         Risks and Recommendations:  The patient has the following additional risks and recommendations for perioperative complications:  Diabetes:  - Patient is not on insulin therapy: regular NPO guidelines can be followed.     Medication Instructions:   - metformin: HOLD day of surgery.    RECOMMENDATION:  APPROVAL GIVEN to proceed with proposed procedure, without further diagnostic evaluation.    Signed Electronically by: Julia Rogers CNP    Copy of this evaluation report is provided to requesting physician.    Preop [facility] Preop Guidelines    Revised Cardiac Risk Index

## 2021-01-08 LAB
ALBUMIN SERPL-MCNC: 4 G/DL (ref 3.4–5)
ALP SERPL-CCNC: 73 U/L (ref 40–150)
ALT SERPL W P-5'-P-CCNC: 83 U/L (ref 0–70)
ANION GAP SERPL CALCULATED.3IONS-SCNC: 6 MMOL/L (ref 3–14)
AST SERPL W P-5'-P-CCNC: 40 U/L (ref 0–45)
BILIRUB SERPL-MCNC: 0.5 MG/DL (ref 0.2–1.3)
BUN SERPL-MCNC: 19 MG/DL (ref 7–30)
CALCIUM SERPL-MCNC: 9.1 MG/DL (ref 8.5–10.1)
CHLORIDE SERPL-SCNC: 103 MMOL/L (ref 94–109)
CO2 SERPL-SCNC: 25 MMOL/L (ref 20–32)
CREAT SERPL-MCNC: 0.84 MG/DL (ref 0.66–1.25)
GFR SERPL CREATININE-BSD FRML MDRD: >90 ML/MIN/{1.73_M2}
GLUCOSE SERPL-MCNC: 157 MG/DL (ref 70–99)
POTASSIUM SERPL-SCNC: 3.8 MMOL/L (ref 3.4–5.3)
PROT SERPL-MCNC: 7.6 G/DL (ref 6.8–8.8)
SODIUM SERPL-SCNC: 134 MMOL/L (ref 133–144)

## 2021-01-08 NOTE — OR NURSING
[facility] RN Knudsen, David Carrillo MD   Cc: [facility]             Good Afternoon,          Please review Randaller's H&P/labs. He has a Hem A1C of 8.6 on 1/7/21.  Scheduled for surgery on 1/13.     Thank-you!   CYNDI Ordoñez   Pre-Admission Dept

## 2021-01-10 DIAGNOSIS — Z11.59 ENCOUNTER FOR SCREENING FOR OTHER VIRAL DISEASES: ICD-10-CM

## 2021-01-10 LAB
SARS-COV-2 RNA RESP QL NAA+PROBE: NORMAL
SPECIMEN SOURCE: NORMAL

## 2021-01-10 PROCEDURE — U0003 INFECTIOUS AGENT DETECTION BY NUCLEIC ACID (DNA OR RNA); SEVERE ACUTE RESPIRATORY SYNDROME CORONAVIRUS 2 (SARS-COV-2) (CORONAVIRUS DISEASE [COVID-19]), AMPLIFIED PROBE TECHNIQUE, MAKING USE OF HIGH THROUGHPUT TECHNOLOGIES AS DESCRIBED BY CMS-2020-01-R: HCPCS | Performed by: ORTHOPAEDIC SURGERY

## 2021-01-10 PROCEDURE — U0005 INFEC AGEN DETEC AMPLI PROBE: HCPCS | Performed by: ORTHOPAEDIC SURGERY

## 2021-01-11 LAB
LABORATORY COMMENT REPORT: NORMAL
SARS-COV-2 RNA RESP QL NAA+PROBE: NEGATIVE
SPECIMEN SOURCE: NORMAL

## 2021-01-13 ENCOUNTER — HOSPITAL ENCOUNTER (OUTPATIENT)
Facility: CLINIC | Age: 51
Discharge: HOME | End: 2021-01-13
Attending: ORTHOPAEDIC SURGERY | Admitting: ORTHOPAEDIC SURGERY
Payer: OTHER MISCELLANEOUS

## 2021-01-13 ENCOUNTER — ANCILLARY PROCEDURE (OUTPATIENT)
Dept: ULTRASOUND IMAGING | Facility: CLINIC | Age: 51
End: 2021-01-13
Attending: STUDENT IN AN ORGANIZED HEALTH CARE EDUCATION/TRAINING PROGRAM
Payer: OTHER MISCELLANEOUS

## 2021-01-13 ENCOUNTER — ANESTHESIA EVENT (OUTPATIENT)
Dept: SURGERY | Facility: CLINIC | Age: 51
End: 2021-01-13
Payer: OTHER MISCELLANEOUS

## 2021-01-13 ENCOUNTER — ANESTHESIA (OUTPATIENT)
Dept: SURGERY | Facility: CLINIC | Age: 51
End: 2021-01-13
Payer: OTHER MISCELLANEOUS

## 2021-01-13 VITALS
WEIGHT: 274.25 LBS | TEMPERATURE: 98.7 F | RESPIRATION RATE: 14 BRPM | BODY MASS INDEX: 37.15 KG/M2 | DIASTOLIC BLOOD PRESSURE: 73 MMHG | SYSTOLIC BLOOD PRESSURE: 105 MMHG | HEIGHT: 72 IN | OXYGEN SATURATION: 92 % | HEART RATE: 104 BPM

## 2021-01-13 DIAGNOSIS — Z98.890 S/P ARTHROSCOPY OF LEFT SHOULDER: ICD-10-CM

## 2021-01-13 DIAGNOSIS — M75.122 COMPLETE TEAR OF LEFT ROTATOR CUFF, UNSPECIFIED WHETHER TRAUMATIC: Primary | ICD-10-CM

## 2021-01-13 DIAGNOSIS — Z47.89 AFTERCARE FOLLOWING SURGERY OF THE MUSCULOSKELETAL SYSTEM: ICD-10-CM

## 2021-01-13 DIAGNOSIS — M75.122 COMPLETE TEAR OF LEFT ROTATOR CUFF, UNSPECIFIED WHETHER TRAUMATIC: ICD-10-CM

## 2021-01-13 LAB
GLUCOSE BLDC GLUCOMTR-MCNC: 137 MG/DL (ref 70–99)
GLUCOSE BLDC GLUCOMTR-MCNC: 156 MG/DL (ref 70–99)
GLUCOSE BLDC GLUCOMTR-MCNC: 230 MG/DL (ref 70–99)
POTASSIUM SERPL-SCNC: 4.2 MMOL/L (ref 3.4–5.3)

## 2021-01-13 PROCEDURE — 250N000009 HC RX 250: Performed by: ORTHOPAEDIC SURGERY

## 2021-01-13 PROCEDURE — 250N000011 HC RX IP 250 OP 636: Performed by: NURSE ANESTHETIST, CERTIFIED REGISTERED

## 2021-01-13 PROCEDURE — C1713 ANCHOR/SCREW BN/BN,TIS/BN: HCPCS | Performed by: ORTHOPAEDIC SURGERY

## 2021-01-13 PROCEDURE — 250N000011 HC RX IP 250 OP 636: Performed by: ORTHOPAEDIC SURGERY

## 2021-01-13 PROCEDURE — 370N000017 HC ANESTHESIA TECHNICAL FEE, PER MIN: Performed by: ORTHOPAEDIC SURGERY

## 2021-01-13 PROCEDURE — 29827 SHO ARTHRS SRG RT8TR CUF RPR: CPT | Mod: LT | Performed by: ORTHOPAEDIC SURGERY

## 2021-01-13 PROCEDURE — 250N000009 HC RX 250: Performed by: NURSE ANESTHETIST, CERTIFIED REGISTERED

## 2021-01-13 PROCEDURE — 29823 SHO ARTHRS SRG XTNSV DBRDMT: CPT | Mod: 80 | Performed by: ORTHOPAEDIC SURGERY

## 2021-01-13 PROCEDURE — 250N000025 HC SEVOFLURANE, PER MIN: Performed by: ORTHOPAEDIC SURGERY

## 2021-01-13 PROCEDURE — 250N000013 HC RX MED GY IP 250 OP 250 PS 637: Performed by: ORTHOPAEDIC SURGERY

## 2021-01-13 PROCEDURE — 710N000010 HC RECOVERY PHASE 1, LEVEL 2, PER MIN: Performed by: ORTHOPAEDIC SURGERY

## 2021-01-13 PROCEDURE — 271N000001 HC OR GENERAL SUPPLY NON-STERILE: Performed by: ORTHOPAEDIC SURGERY

## 2021-01-13 PROCEDURE — 710N000012 HC RECOVERY PHASE 2, PER MINUTE: Performed by: ORTHOPAEDIC SURGERY

## 2021-01-13 PROCEDURE — 250N000011 HC RX IP 250 OP 636: Performed by: STUDENT IN AN ORGANIZED HEALTH CARE EDUCATION/TRAINING PROGRAM

## 2021-01-13 PROCEDURE — 360N000077 HC SURGERY LEVEL 4, PER MIN: Performed by: ORTHOPAEDIC SURGERY

## 2021-01-13 PROCEDURE — 258N000003 HC RX IP 258 OP 636: Performed by: ORTHOPAEDIC SURGERY

## 2021-01-13 PROCEDURE — 84132 ASSAY OF SERUM POTASSIUM: CPT | Performed by: STUDENT IN AN ORGANIZED HEALTH CARE EDUCATION/TRAINING PROGRAM

## 2021-01-13 PROCEDURE — 258N000003 HC RX IP 258 OP 636: Performed by: NURSE ANESTHETIST, CERTIFIED REGISTERED

## 2021-01-13 PROCEDURE — 29823 SHO ARTHRS SRG XTNSV DBRDMT: CPT | Mod: LT | Performed by: ORTHOPAEDIC SURGERY

## 2021-01-13 PROCEDURE — 36415 COLL VENOUS BLD VENIPUNCTURE: CPT | Performed by: STUDENT IN AN ORGANIZED HEALTH CARE EDUCATION/TRAINING PROGRAM

## 2021-01-13 PROCEDURE — 999N001017 HC STATISTIC GLUCOSE BY METER IP

## 2021-01-13 PROCEDURE — 29827 SHO ARTHRS SRG RT8TR CUF RPR: CPT | Mod: 80 | Performed by: ORTHOPAEDIC SURGERY

## 2021-01-13 PROCEDURE — 250N000009 HC RX 250: Performed by: STUDENT IN AN ORGANIZED HEALTH CARE EDUCATION/TRAINING PROGRAM

## 2021-01-13 PROCEDURE — 258N000003 HC RX IP 258 OP 636: Performed by: STUDENT IN AN ORGANIZED HEALTH CARE EDUCATION/TRAINING PROGRAM

## 2021-01-13 PROCEDURE — 999N000141 HC STATISTIC PRE-PROCEDURE NURSING ASSESSMENT: Performed by: ORTHOPAEDIC SURGERY

## 2021-01-13 PROCEDURE — 272N000001 HC OR GENERAL SUPPLY STERILE: Performed by: ORTHOPAEDIC SURGERY

## 2021-01-13 DEVICE — DBL LOADED 4.75MM BIO-COMP SWVLK
Type: IMPLANTABLE DEVICE | Site: SHOULDER | Status: FUNCTIONAL
Brand: ARTHREX®

## 2021-01-13 RX ORDER — FENTANYL CITRATE 50 UG/ML
25-50 INJECTION, SOLUTION INTRAMUSCULAR; INTRAVENOUS
Status: DISCONTINUED | OUTPATIENT
Start: 2021-01-13 | End: 2021-01-13 | Stop reason: HOSPADM

## 2021-01-13 RX ORDER — METOCLOPRAMIDE 5 MG/1
5 TABLET ORAL EVERY 8 HOURS PRN
Qty: 10 TABLET | Refills: 0 | Status: SHIPPED | OUTPATIENT
Start: 2021-01-13 | End: 2021-01-28

## 2021-01-13 RX ORDER — NALOXONE HYDROCHLORIDE 0.4 MG/ML
0.4 INJECTION, SOLUTION INTRAMUSCULAR; INTRAVENOUS; SUBCUTANEOUS
Status: DISCONTINUED | OUTPATIENT
Start: 2021-01-13 | End: 2021-01-13 | Stop reason: HOSPADM

## 2021-01-13 RX ORDER — ACETAMINOPHEN 325 MG/1
650 TABLET ORAL
Status: DISCONTINUED | OUTPATIENT
Start: 2021-01-13 | End: 2021-01-13 | Stop reason: HOSPADM

## 2021-01-13 RX ORDER — SODIUM CHLORIDE, SODIUM LACTATE, POTASSIUM CHLORIDE, CALCIUM CHLORIDE 600; 310; 30; 20 MG/100ML; MG/100ML; MG/100ML; MG/100ML
INJECTION, SOLUTION INTRAVENOUS CONTINUOUS
Status: DISCONTINUED | OUTPATIENT
Start: 2021-01-13 | End: 2021-01-13 | Stop reason: HOSPADM

## 2021-01-13 RX ORDER — ONDANSETRON 2 MG/ML
INJECTION INTRAMUSCULAR; INTRAVENOUS PRN
Status: DISCONTINUED | OUTPATIENT
Start: 2021-01-13 | End: 2021-01-13

## 2021-01-13 RX ORDER — DEXAMETHASONE SODIUM PHOSPHATE 4 MG/ML
INJECTION, SOLUTION INTRA-ARTICULAR; INTRALESIONAL; INTRAMUSCULAR; INTRAVENOUS; SOFT TISSUE PRN
Status: DISCONTINUED | OUTPATIENT
Start: 2021-01-13 | End: 2021-01-13

## 2021-01-13 RX ORDER — BUPIVACAINE HYDROCHLORIDE AND EPINEPHRINE 5; 5 MG/ML; UG/ML
INJECTION, SOLUTION PERINEURAL PRN
Status: DISCONTINUED | OUTPATIENT
Start: 2021-01-13 | End: 2021-01-13 | Stop reason: HOSPADM

## 2021-01-13 RX ORDER — GABAPENTIN 300 MG/1
600 CAPSULE ORAL ONCE
Status: COMPLETED | OUTPATIENT
Start: 2021-01-13 | End: 2021-01-13

## 2021-01-13 RX ORDER — LABETALOL HYDROCHLORIDE 5 MG/ML
10 INJECTION, SOLUTION INTRAVENOUS
Status: DISCONTINUED | OUTPATIENT
Start: 2021-01-13 | End: 2021-01-13 | Stop reason: HOSPADM

## 2021-01-13 RX ORDER — DEXAMETHASONE SODIUM PHOSPHATE 10 MG/ML
INJECTION, SOLUTION INTRAMUSCULAR; INTRAVENOUS PRN
Status: DISCONTINUED | OUTPATIENT
Start: 2021-01-13 | End: 2021-01-13

## 2021-01-13 RX ORDER — BUPIVACAINE HYDROCHLORIDE 5 MG/ML
INJECTION, SOLUTION EPIDURAL; INTRACAUDAL PRN
Status: DISCONTINUED | OUTPATIENT
Start: 2021-01-13 | End: 2021-01-13

## 2021-01-13 RX ORDER — HYDROXYZINE HYDROCHLORIDE 25 MG/1
25 TABLET, FILM COATED ORAL
Status: DISCONTINUED | OUTPATIENT
Start: 2021-01-13 | End: 2021-01-13 | Stop reason: HOSPADM

## 2021-01-13 RX ORDER — FENTANYL CITRATE 50 UG/ML
INJECTION, SOLUTION INTRAMUSCULAR; INTRAVENOUS PRN
Status: DISCONTINUED | OUTPATIENT
Start: 2021-01-13 | End: 2021-01-13

## 2021-01-13 RX ORDER — FLUMAZENIL 0.1 MG/ML
0.2 INJECTION, SOLUTION INTRAVENOUS
Status: DISCONTINUED | OUTPATIENT
Start: 2021-01-13 | End: 2021-01-13 | Stop reason: HOSPADM

## 2021-01-13 RX ORDER — ACETAMINOPHEN 325 MG/1
975 TABLET ORAL ONCE
Status: COMPLETED | OUTPATIENT
Start: 2021-01-13 | End: 2021-01-13

## 2021-01-13 RX ORDER — NALOXONE HYDROCHLORIDE 0.4 MG/ML
0.2 INJECTION, SOLUTION INTRAMUSCULAR; INTRAVENOUS; SUBCUTANEOUS
Status: DISCONTINUED | OUTPATIENT
Start: 2021-01-13 | End: 2021-01-13 | Stop reason: HOSPADM

## 2021-01-13 RX ORDER — LIDOCAINE 40 MG/G
CREAM TOPICAL
Status: DISCONTINUED | OUTPATIENT
Start: 2021-01-13 | End: 2021-01-13 | Stop reason: HOSPADM

## 2021-01-13 RX ORDER — IBUPROFEN 200 MG
400 TABLET ORAL
Status: COMPLETED | OUTPATIENT
Start: 2021-01-13 | End: 2021-01-13

## 2021-01-13 RX ORDER — KETAMINE HYDROCHLORIDE 10 MG/ML
INJECTION INTRAMUSCULAR; INTRAVENOUS PRN
Status: DISCONTINUED | OUTPATIENT
Start: 2021-01-13 | End: 2021-01-13

## 2021-01-13 RX ORDER — ASPIRIN 81 MG
100 TABLET, DELAYED RELEASE (ENTERIC COATED) ORAL 2 TIMES DAILY PRN
Qty: 30 TABLET | Refills: 0 | Status: SHIPPED | OUTPATIENT
Start: 2021-01-13 | End: 2021-01-28

## 2021-01-13 RX ORDER — EPHEDRINE SULFATE 50 MG/ML
INJECTION, SOLUTION INTRAMUSCULAR; INTRAVENOUS; SUBCUTANEOUS PRN
Status: DISCONTINUED | OUTPATIENT
Start: 2021-01-13 | End: 2021-01-13

## 2021-01-13 RX ORDER — ONDANSETRON 4 MG/1
4 TABLET, ORALLY DISINTEGRATING ORAL EVERY 30 MIN PRN
Status: DISCONTINUED | OUTPATIENT
Start: 2021-01-13 | End: 2021-01-13 | Stop reason: HOSPADM

## 2021-01-13 RX ORDER — SENNOSIDES 8.6 MG
2 TABLET ORAL 2 TIMES DAILY PRN
Qty: 30 TABLET | Refills: 0 | Status: SHIPPED | OUTPATIENT
Start: 2021-01-13 | End: 2021-01-28

## 2021-01-13 RX ORDER — KETOROLAC TROMETHAMINE 30 MG/ML
INJECTION, SOLUTION INTRAMUSCULAR; INTRAVENOUS PRN
Status: DISCONTINUED | OUTPATIENT
Start: 2021-01-13 | End: 2021-01-13

## 2021-01-13 RX ORDER — KETOROLAC TROMETHAMINE 10 MG/1
TABLET, FILM COATED ORAL
Qty: 13 TABLET | Refills: 0 | Status: SHIPPED | OUTPATIENT
Start: 2021-01-13 | End: 2021-01-18

## 2021-01-13 RX ORDER — OXYCODONE HYDROCHLORIDE 5 MG/1
5 TABLET ORAL
Status: DISCONTINUED | OUTPATIENT
Start: 2021-01-13 | End: 2021-01-13 | Stop reason: HOSPADM

## 2021-01-13 RX ORDER — OXYCODONE HYDROCHLORIDE 5 MG/1
5 TABLET ORAL EVERY 4 HOURS PRN
Qty: 20 TABLET | Refills: 0 | Status: SHIPPED | OUTPATIENT
Start: 2021-01-13 | End: 2021-01-28

## 2021-01-13 RX ORDER — HYDROMORPHONE HYDROCHLORIDE 1 MG/ML
.3-.5 INJECTION, SOLUTION INTRAMUSCULAR; INTRAVENOUS; SUBCUTANEOUS EVERY 5 MIN PRN
Status: DISCONTINUED | OUTPATIENT
Start: 2021-01-13 | End: 2021-01-13 | Stop reason: HOSPADM

## 2021-01-13 RX ORDER — SENNOSIDES 8.6 MG
CAPSULE ORAL
Qty: 100 TABLET | Refills: 0 | Status: SHIPPED | OUTPATIENT
Start: 2021-01-13 | End: 2021-01-28

## 2021-01-13 RX ORDER — SODIUM CHLORIDE, SODIUM LACTATE, POTASSIUM CHLORIDE, CALCIUM CHLORIDE 600; 310; 30; 20 MG/100ML; MG/100ML; MG/100ML; MG/100ML
INJECTION, SOLUTION INTRAVENOUS CONTINUOUS PRN
Status: DISCONTINUED | OUTPATIENT
Start: 2021-01-13 | End: 2021-01-13

## 2021-01-13 RX ORDER — OMEPRAZOLE 40 MG/1
40 CAPSULE, DELAYED RELEASE ORAL DAILY
Qty: 7 CAPSULE | Refills: 0 | Status: SHIPPED | OUTPATIENT
Start: 2021-01-13 | End: 2021-01-28

## 2021-01-13 RX ORDER — LIDOCAINE HYDROCHLORIDE 20 MG/ML
INJECTION, SOLUTION INFILTRATION; PERINEURAL PRN
Status: DISCONTINUED | OUTPATIENT
Start: 2021-01-13 | End: 2021-01-13

## 2021-01-13 RX ORDER — PROPOFOL 10 MG/ML
INJECTION, EMULSION INTRAVENOUS PRN
Status: DISCONTINUED | OUTPATIENT
Start: 2021-01-13 | End: 2021-01-13

## 2021-01-13 RX ORDER — ONDANSETRON 2 MG/ML
4 INJECTION INTRAMUSCULAR; INTRAVENOUS EVERY 30 MIN PRN
Status: DISCONTINUED | OUTPATIENT
Start: 2021-01-13 | End: 2021-01-13 | Stop reason: HOSPADM

## 2021-01-13 RX ORDER — CEFAZOLIN SODIUM IN 0.9 % NACL 3 G/100 ML
3 INTRAVENOUS SOLUTION, PIGGYBACK (ML) INTRAVENOUS
Status: COMPLETED | OUTPATIENT
Start: 2021-01-13 | End: 2021-01-13

## 2021-01-13 RX ORDER — CEFAZOLIN SODIUM 1 G/3ML
1 INJECTION, POWDER, FOR SOLUTION INTRAMUSCULAR; INTRAVENOUS SEE ADMIN INSTRUCTIONS
Status: DISCONTINUED | OUTPATIENT
Start: 2021-01-13 | End: 2021-01-13 | Stop reason: HOSPADM

## 2021-01-13 RX ORDER — GABAPENTIN 300 MG/1
300 CAPSULE ORAL 3 TIMES DAILY
Qty: 9 CAPSULE | Refills: 0 | Status: SHIPPED | OUTPATIENT
Start: 2021-01-13 | End: 2021-01-28

## 2021-01-13 RX ORDER — ONDANSETRON 4 MG/1
4 TABLET, ORALLY DISINTEGRATING ORAL
Status: DISCONTINUED | OUTPATIENT
Start: 2021-01-13 | End: 2021-01-13 | Stop reason: HOSPADM

## 2021-01-13 RX ADMIN — SUGAMMADEX 200 MG: 100 INJECTION, SOLUTION INTRAVENOUS at 16:45

## 2021-01-13 RX ADMIN — LIDOCAINE HYDROCHLORIDE 100 MG: 20 INJECTION, SOLUTION INFILTRATION; PERINEURAL at 14:15

## 2021-01-13 RX ADMIN — KETOROLAC TROMETHAMINE 30 MG: 30 INJECTION, SOLUTION INTRAMUSCULAR at 16:54

## 2021-01-13 RX ADMIN — PHENYLEPHRINE HYDROCHLORIDE 0.2 MCG/KG/MIN: 10 INJECTION INTRAVENOUS at 14:51

## 2021-01-13 RX ADMIN — PHENYLEPHRINE HYDROCHLORIDE 100 MCG: 10 INJECTION INTRAVENOUS at 14:46

## 2021-01-13 RX ADMIN — PHENYLEPHRINE HYDROCHLORIDE 100 MCG: 10 INJECTION INTRAVENOUS at 14:26

## 2021-01-13 RX ADMIN — Medication 1 G: at 16:31

## 2021-01-13 RX ADMIN — PHENYLEPHRINE HYDROCHLORIDE 150 MCG: 10 INJECTION INTRAVENOUS at 15:11

## 2021-01-13 RX ADMIN — Medication 3 G: at 14:30

## 2021-01-13 RX ADMIN — Medication 10 MG: at 15:17

## 2021-01-13 RX ADMIN — FENTANYL CITRATE 50 MCG: 50 INJECTION, SOLUTION INTRAMUSCULAR; INTRAVENOUS at 15:38

## 2021-01-13 RX ADMIN — HYDROMORPHONE HYDROCHLORIDE 0.3 MG: 1 INJECTION, SOLUTION INTRAMUSCULAR; INTRAVENOUS; SUBCUTANEOUS at 16:35

## 2021-01-13 RX ADMIN — TRANEXAMIC ACID 1 G: 100 INJECTION, SOLUTION INTRAVENOUS at 14:30

## 2021-01-13 RX ADMIN — IBUPROFEN 400 MG: 200 TABLET, FILM COATED ORAL at 13:23

## 2021-01-13 RX ADMIN — PHENYLEPHRINE HYDROCHLORIDE 100 MCG: 10 INJECTION INTRAVENOUS at 14:55

## 2021-01-13 RX ADMIN — PROPOFOL 100 MG: 10 INJECTION, EMULSION INTRAVENOUS at 14:22

## 2021-01-13 RX ADMIN — ROCURONIUM BROMIDE 50 MG: 10 INJECTION INTRAVENOUS at 14:16

## 2021-01-13 RX ADMIN — BUPIVACAINE HYDROCHLORIDE 20 ML: 5 INJECTION, SOLUTION EPIDURAL; INTRACAUDAL at 13:40

## 2021-01-13 RX ADMIN — FENTANYL CITRATE 50 MCG: 50 INJECTION, SOLUTION INTRAMUSCULAR; INTRAVENOUS at 13:28

## 2021-01-13 RX ADMIN — Medication 5 MG: at 16:16

## 2021-01-13 RX ADMIN — PHENYLEPHRINE HYDROCHLORIDE 100 MCG: 10 INJECTION INTRAVENOUS at 14:45

## 2021-01-13 RX ADMIN — Medication 5 MG: at 15:11

## 2021-01-13 RX ADMIN — Medication 5 MG: at 14:55

## 2021-01-13 RX ADMIN — SODIUM CHLORIDE, POTASSIUM CHLORIDE, SODIUM LACTATE AND CALCIUM CHLORIDE: 600; 310; 30; 20 INJECTION, SOLUTION INTRAVENOUS at 14:06

## 2021-01-13 RX ADMIN — GABAPENTIN 600 MG: 300 CAPSULE ORAL at 13:22

## 2021-01-13 RX ADMIN — PROPOFOL 200 MG: 10 INJECTION, EMULSION INTRAVENOUS at 14:16

## 2021-01-13 RX ADMIN — ACETAMINOPHEN 975 MG: 325 TABLET, FILM COATED ORAL at 13:22

## 2021-01-13 RX ADMIN — DEXMEDETOMIDINE HYDROCHLORIDE 40 MCG: 100 INJECTION, SOLUTION INTRAVENOUS at 13:40

## 2021-01-13 RX ADMIN — ONDANSETRON 4 MG: 2 INJECTION INTRAMUSCULAR; INTRAVENOUS at 16:34

## 2021-01-13 RX ADMIN — DEXAMETHASONE SODIUM PHOSPHATE 2 MG: 10 INJECTION, SOLUTION INTRAMUSCULAR; INTRAVENOUS at 13:40

## 2021-01-13 RX ADMIN — HYDROMORPHONE HYDROCHLORIDE 0.2 MG: 1 INJECTION, SOLUTION INTRAMUSCULAR; INTRAVENOUS; SUBCUTANEOUS at 15:22

## 2021-01-13 RX ADMIN — HYDROMORPHONE HYDROCHLORIDE 0.3 MG: 1 INJECTION, SOLUTION INTRAMUSCULAR; INTRAVENOUS; SUBCUTANEOUS at 15:27

## 2021-01-13 RX ADMIN — DEXAMETHASONE SODIUM PHOSPHATE 6 MG: 4 INJECTION, SOLUTION INTRAMUSCULAR; INTRAVENOUS at 14:59

## 2021-01-13 RX ADMIN — FENTANYL CITRATE 100 MCG: 50 INJECTION, SOLUTION INTRAMUSCULAR; INTRAVENOUS at 14:16

## 2021-01-13 RX ADMIN — MIDAZOLAM 2 MG: 1 INJECTION INTRAMUSCULAR; INTRAVENOUS at 13:28

## 2021-01-13 ASSESSMENT — MIFFLIN-ST. JEOR: SCORE: 2132.13

## 2021-01-13 NOTE — OR NURSING
Dr Thomas notiifed of blood glucose reading in PACU. No orders received.  Will continue to monitor.

## 2021-01-13 NOTE — ANESTHESIA PREPROCEDURE EVALUATION
"Anesthesia Pre-Procedure Evaluation    Patient: Thomas Pierce   MRN:     [id number] Gender:   male   Age:    50 year old :      1970        Preoperative Diagnosis: Complete tear of left rotator cuff, unspecified whether traumatic [M75.122]   Procedure(s):  LEFT ARTHROSCOPY, SHOULDER, WITH REVISION ROTATOR CUFF REPAIR, SUBACROMIAL DECOMPRESSION, POSSIBLE DEBRIDEMENT, POSSIBLE ROTATOR CUFF ALLOGRAFT RECONSTRUCTION     LABS:  CBC:   Lab Results   Component Value Date    WBC 9.5 2021    WBC 8.3 2020    HGB 16.4 2021    HGB 16.1 2020    HCT 45.4 2021    HCT 44.7 2020     2021     2020     BMP:   Lab Results   Component Value Date     2021     2020    POTASSIUM 4.2 2021    POTASSIUM 3.8 2021    CHLORIDE 103 2021    CHLORIDE 102 2020    CO2 25 2021    CO2 25 2020    BUN 19 2021    BUN 16 2020    CR 0.84 2021    CR 0.83 2020     (H) 2021     (H) 2020     COAGS: No results found for: PTT, INR, FIBR  POC:   Lab Results   Component Value Date     (H) 2021     OTHER:   Lab Results   Component Value Date    A1C 8.6 (H) 2021    KTALYN 9.1 2021    ALBUMIN 4.0 2021    PROTTOTAL 7.6 2021    ALT 83 (H) 2021    AST 40 2021    ALKPHOS 73 2021    BILITOTAL 0.5 2021    TSH 2.20 10/16/2018        Preop Vitals    BP Readings from Last 3 Encounters:   21 119/78   21 118/76   20 120/82    Pulse Readings from Last 3 Encounters:   21 71   21 102   20 85      Resp Readings from Last 3 Encounters:   21 16   20 14   13 22    SpO2 Readings from Last 3 Encounters:   21 98%   21 98%   20 94%      Temp Readings from Last 1 Encounters:   21 36.6  C (97.9  F) (Oral)    Ht Readings from Last 1 Encounters:   21 1.829 m (6' 0.01\") " "     Wt Readings from Last 1 Encounters:   01/13/21 124.4 kg (274 lb 4 oz)    Estimated body mass index is 37.19 kg/m  as calculated from the following:    Height as of this encounter: 1.829 m (6' 0.01\").    Weight as of this encounter: 124.4 kg (274 lb 4 oz).     LDA:  Peripheral IV 01/13/21 Right Hand (Active)   Site Assessment WDL 01/13/21 1245   Line Status Saline locked 01/13/21 1245   Dressing Intervention New dressing  01/13/21 1245   Phlebitis Scale 0-->no symptoms 01/13/21 1245   Infiltration Scale 0 01/13/21 1245   Number of days: 0        Past Medical History:   Diagnosis Date     Hyperlipidemia LDL goal <160 10/20/2011     Hypertension goal BP (blood pressure) < 140/90 10/20/2011     Prediabetes       Past Surgical History:   Procedure Laterality Date     ARTHROSCOPY SHOULDER ROTATOR CUFF REPAIR, BICEP TENODESIS REPAIR Left 5/12/2020    Procedure: LEFT SHOULDER ARTHROSCOPIC ROTATOR CUFF REPAIR, BICEPS TENODESIS, SUBACROMIAL DECOMPRESSION,  DEBRIDEMENT;  Surgeon: David Boyd MD;  Location: [facility] OR     MANDIBLE SURGERY  1978    trauma while sledding      No Known Allergies     Anesthesia Evaluation     . Pt has had prior anesthetic. Type: General and MAC    No history of anesthetic complications          ROS/MED HX    ENT/Pulmonary:  - neg pulmonary ROS     Neurologic:  - neg neurologic ROS     Cardiovascular:     (+) hypertension----. : . . . :. .       METS/Exercise Tolerance:  >4 METS   Hematologic:  - neg hematologic  ROS       Musculoskeletal:  - neg musculoskeletal ROS       GI/Hepatic:  - neg GI/hepatic ROS       Renal/Genitourinary:  - ROS Renal section negative       Endo:     (+) type II DM Obesity, .   (-) Type I DM   Psychiatric:  - neg psychiatric ROS       Infectious Disease:  - neg infectious disease ROS       Malignancy:      - no malignancy   Other:    - neg other ROS                     PHYSICAL EXAM:   Mental Status/Neuro: A/A/O   Airway: Facies: Feasible  Mallampati: " II  Mouth/Opening: Full  TM distance: > 6 cm  Neck ROM: Full   Respiratory: Auscultation: CTAB     Resp. Rate: Normal     Resp. Effort: Normal      CV: Rhythm: Regular  Rate: Age appropriate  Heart: Normal Sounds  Edema: None   Comments:      Dental: Normal Dentition                Assessment:   ASA SCORE: 2    H&P: History and physical reviewed and following examination; no interval change.   Smoking Status:  Non-Smoker/Unknown   NPO Status: NPO Appropriate     Plan:   Anes. Type:  General   Pre-Medication: None   Induction:  IV (Standard)   Airway: ETT; Oral   Access/Monitoring: PIV   Maintenance: Balanced     Postop Plan:   Postop Pain: Opioids  Postop Sedation/Airway: Not planned     PONV Management:   Adult Risk Factors:, Non-Smoker, Postop Opioids                   Zachary Lees MD

## 2021-01-13 NOTE — ANESTHESIA CARE TRANSFER NOTE
Patient: Thomas Pierce    Procedure(s):  LEFT ARTHROSCOPY, SHOULDER, WITH REVISION ROTATOR CUFF REPAIR, SUBACROMIAL DECOMPRESSION AND CUPSULAR RELEASE    Diagnosis: Complete tear of left rotator cuff, unspecified whether traumatic [M75.122]  Diagnosis Additional Information: No value filed.    Anesthesia Type:   General     Note:  Airway :Face Mask  Patient transferred to:PACU  Comments: Regular respirations and patent airway. VSS. IV patent and infusing. Pt resting comfortably. Report given to RN  Handoff Report: Identifed the Patient, Identified the Reponsible Provider, Reviewed the pertinent medical history, Discussed the surgical course, Reviewed Intra-OP anesthesia mangement and issues during anesthesia, Set expectations for post-procedure period and Allowed opportunity for questions and acknowledgement of understanding      Vitals: (Last set prior to Anesthesia Care Transfer)    CRNA VITALS  1/13/2021 1626 - 1/13/2021 1702      1/13/2021             NIBP:  108/70    Pulse:  81    Temp:  37  C (98.6  F)    SpO2:  97 %                Electronically Signed By: CARLYN Guan CRNA  January 13, 2021  5:02 PM

## 2021-01-13 NOTE — DISCHARGE INSTRUCTIONS
Caring for Your Incision    You ll need to care for your incision after surgery and certain medical procedures. To close an incision, your doctor used sutures (stitches), steri-strips, staples, or dermabond. Follow the tips on this sheet to help heal and prevent infection of your incision.   Types of Incision Closure:    Surgical Sutures (stitches) are placed by sewing the edges of an incision together with surgical thread. Sutures are either absorbable or non-absorbable. Absorbable sutures break down in the body over time. Non-absorbable sutures need to be removed.     Steri-strips are made of adhesive (sticky) material to help hold the edges of an incision together. Steri-strips usually fall off by themselves in 7 to 10 days.     Surgical Staples are made or steel or titanium. They are often used to close shallow incisions. They are not used on certain body areas, such as the face and hands. This is because these areas have nerves that are close to the surface. Staples are usually removed within a week.     Dermabond (skin glue) is used to close a cut or small incision. The skin glue is less painful than stitches (sutures). In some cases, a lower layer of skin may be sutured before Dermabond is applied. The skin glue closes the cut/incision within a few minutes. It also provides a water-resistant covering. No bandage is required. Dermabond peels off on its own within 5 to 10 days.  Home Care for Your  Incision:    Keep the incision clean and dry. You should bathe only as directed by the doctor. It is okay to wash around the incision, but don t spray water directly on it.     Check the incision site daily for pain, redness, drainage, swelling, or separation of the incision edges.     If you have a dressing over the incision, change the dressing as directed by the doctor.    Make sure any clothing that touches the incision is loose fitting. This will prevent rubbing. If the incision is on the head, avoid wearing  caps or other head coverings. These may rub against the incision.    Avoid rough play, contact sports, or physical activities. This can put you at risk of opening an incision.     As your incision heals, the skin may appear pink or red. It may also feel slightly bumpy or raised. This is called a healing ridge. Over time, the color should fade and the raised skin will become less noticeable.   Call the doctor right away if you have any of the following:    Increased pain, redness, drainage, swelling or bleeding at the incision site    Numbness, coldness or tingling around the incision site    Fever of 101 F degrees or higher  Rev. 4/2014    Same-Day Surgery   Adult Discharge Orders & Instructions     For 24 hours after surgery:  1. Get plenty of rest.  A responsible adult must stay with you for at least 24 hours after you leave the hospital.   2. Pain medication can slow your reflexes. Do not drive or use heavy equipment.  If you have weakness or tingling, don't drive or use heavy equipment until this feeling goes away.  3. Mixing alcohol and pain medication can cause dizziness and slow your breathing. It can even be fatal. Do not drink alcohol while taking pain medication.  4. Avoid strenuous or risky activities.  Ask for help when climbing stairs.   5. You may feel lightheaded.  If so, sit for a few minutes before standing.  Have someone help you get up.   6. If you have nausea (feel sick to your stomach), drink only clear liquids such as apple juice, ginger ale, broth or 7-Up.  Rest may also help.  Be sure to drink enough fluids.  Move to a regular diet as you feel able. Take pain medications with a small amount of solid food, such as toast or crackers, to avoid nausea.   7. A slight fever is normal. Call the doctor if your fever is over 100 F (37.7 C) (taken under the tongue) or lasts longer than 24 hours.  8. You may have a dry mouth, muscle aches, trouble sleeping or a sore throat.  These symptoms should go  away after 24 hours.  9. Do not make important or legal decisions.   Pain Management:      1. Take pain medication (if prescribed) for pain as directed by your physician.        2. WARNING: If the pain medication you have been prescribed contains Tylenol  (acetaminophen), DO NOT take additional doses of Tylenol (acetaminophen).     Call your doctor for any of the followin.  Signs of infection (fever, growing tenderness at the surgery site, severe pain, a large amount of drainage or bleeding, foul-smelling drainage, redness, swelling).    2.  It has been over 8 to 10 hours since surgery and you are still not able to urinate (pee).    3.  Headache for over 24 hours.    4.  Numbness, tingling or weakness the day after surgery (if you had spinal anesthesia).  To contact a doctor, call   David Boyd MD  [facility]   [phone]  or:      [phone] and ask for the Resident On Call for:          Orthopedics  (answered 24 hours a day)      Emergency Department:  [facility]: [phone]  [facility]: [phone]               Rev. 10/2014

## 2021-01-13 NOTE — BRIEF OP NOTE
[facility] Brief Operative Note    Pre-operative diagnosis: Complete tear of left rotator cuff, unspecified whether traumatic [M75.122]   Post-operative diagnosis Left shoulder full thickness rotator cuff re-tear, subacromial bursitis, stiffness   Procedure: Procedure(s):  LEFT ARTHROSCOPY, SHOULDER, WITH REVISION ROTATOR CUFF REPAIR, SUBACROMIAL DECOMPRESSION AND CUPSULAR RELEASE   Surgeon(s): Surgeon(s) and Role:     * David Boyd MD - Primary     * Juliocesar Warner MD - Assisting     * Martir Matute MD - Resident - Assisting   Estimated blood loss: 5 mL    Specimens: * No specimens in log *   Findings: See dictated op note for details.

## 2021-01-13 NOTE — ANESTHESIA PROCEDURE NOTES
Peripheral Nerve Block Procedure Note    Date/Time: 1/13/2021 1:40 PM      Staff -   Anesthesiologist:  Zachary Lees MD  Resident/Fellow: Anaya Moore MD  Performed By: anesthesiologist and with residents  Procedure performed by resident/CRNA in presence of a teaching physician.    Location: Pre-op  Procedure Start/Stop TImes:      1/13/2021 1:40 PM    patient identified, IV checked, site marked, risks and benefits discussed, informed consent, monitors and equipment checked, pre-op evaluation, at physician/surgeon's request and post-op pain management      Correct Patient: Yes      Correct Position: Yes      Correct Site: Yes      Correct Procedure: Yes      Correct Laterality:  Yes    Site Marked:  Yes  Procedure details:     Procedure:  Interscalene    ASA:  2    Diagnosis:  Postoperative pain relief    Laterality:  Left    Position:  Supine    Sterile Prep: chloraprep, mask and sterile gloves      Local skin infiltration:  None    Needle:  Insulated    Needle gauge:  21    Needle length (mm):  50    Ultrasound: Yes      Ultrasound used to identify targeted nerve, plexus, or vascular structure and placed a needle adjacent to it      Permanent Image entered into patiient's record      Abnormal pain on injection: No      Blood Aspirated: No      Paresthesias:  No    Bleeding at site: No      Bolus via:  Needle    Infusion Method:  Single Shot    Complications:  None  Assessment/Narrative:     Injection made incrementally with aspirations every (mL):  5

## 2021-01-13 NOTE — ANESTHESIA PROCEDURE NOTES
Airway   Date/Time: 1/13/2021 2:22 PM   Patient location during procedure: OR    Staff -   CRNA: Shalini Shea APRN CRNA  Performed By: CRNA    Consent for Airway   Urgency: elective    Indications and Patient Condition  Indications for airway management: corey-procedural  Induction type:intravenousMask difficulty assessment: 1 - vent by mask    Final Airway Details  Final airway type: endotracheal airway  Successful airway:ETT - single  Endotracheal Airway Details   ETT size (mm): 8.0  Cuffed: yes  Successful intubation technique: direct laryngoscopy  Grade View of Cords: 1  Adjucts: stylet  Measured from: lips  Secured with: silk tape and other (comment) (tegaderm)  Bite block used: Soft    Post intubation assessment   Placement verified by: capnometry, equal breath sounds and chest rise   Number of attempts at approach: 1  Secured with:silk tape and other (comment) (tegaderm)  Ease of procedure: easy  Dentition: Intact and Unchanged

## 2021-01-14 ENCOUNTER — TELEPHONE (OUTPATIENT)
Dept: ORTHOPEDICS | Facility: CLINIC | Age: 51
End: 2021-01-14

## 2021-01-14 NOTE — OP NOTE
Procedure Date: 01/13/2021      PREOPERATIVE DIAGNOSIS:   1.  Left shoulder complete rotator cuff retear of the supraspinatus tendon.   2.  Left shoulder stiffness.   3.  Left shoulder subacromial bursitis.      POSTOPERATIVE DIAGNOSES:   1.  Left shoulder complete rotator cuff retear of the supraspinatus tendon.   2.  Left shoulder stiffness.   3.  Left shoulder subacromial bursitis.      PROCEDURES PERFORMED:   1.  Left shoulder diagnostic arthroscopy.   2.  Left shoulder arthroscopic capsular release and resection.   3.  Left shoulder arthroscopic revision rotator cuff repair of the supraspinatus tendon.      STAFF SURGEON:  David Boyd MD      ASSISTANT:  Juliocesar Warner MD -- Dr. Warner's assistance was necessitated by the surgical complexity of the case.  There were no other available assistants qualified to assist given the complex nature of the scarred, densely adhered, recurrently torn and retracted rotator cuff tendon.  Dr. Warner was necessary for assistance with arthroscopic releases, positioning of the arm, and the capsular resection portion of the procedure.      DEEP VENOUS THROMBOSIS PROPHYLAXIS:  Bilateral lower extremity SCDs.      ESTIMATED BLOOD LOSS:  5 mL.      MEDICATIONS GIVEN:  Two grams of IV cefazolin.      IMPLANTS:  Three Arthrex 4.75 mm BioComposite SwiveLock anchors, 1 in the anteromedial greater tuberosity and 2 in the lateral row.      FINDINGS:  Included a complete full-thickness retear of the anterior one-third to one-half of the supraspinatus tendon.  There were dense scarring adhesions throughout the anterior capsule and the rotator interval.  There were dense adhesions between the acromion and the rotator cuff that required extensive releases.  The humeral head and glenoid cartilage did demonstrate scattered areas of minimal wear.  No areas of full-thickness cartilage loss.  There was extensive subacromial bursitis encountered.  There was some further degenerative  tearing and fraying of the superior and posterior superior labrum.      INDICATIONS FOR PROCEDURE:  Thomas is a pleasant 50-year-old right-hand dominant male who sustained a work-related injury to his left shoulder after a slip and fall on the ice on 02/24/2020.  He underwent a left shoulder arthroscopic massive rotator cuff repair with me on 05/12/2020.  He did well initially, but his progress with physical therapy and his recovery eventually plateaued.  He had no clear inciting reinjury to the left shoulder.  At the 6-month postoperative jim, an MRI scan of the left shoulder was obtained to further evaluate the healing of his rotator cuff tendons.  The MRI scan demonstrated a near full-thickness retear of the anterior supraspinatus tendon.  Ultimately, Thomas continued to have some degree of weakness and pain.  He was diagnosed with the above-listed diagnoses.  The risks, benefits and alternatives to the above-listed procedures were discussed at length with the patient.  The risks of anesthesia, including death, infection, nerve, tendon or vessel injury, deep venous thrombosis, pulmonary embolism, shoulder stiffness, possible rotator cuff retear, possible rotator cuff failure or repair failure, were all discussed at length with the patient.  He also understood the potential for further surgery in the future.  He expressed understanding of these issues and elected to proceed.  Signed informed consent was obtained.  The patient was seen and cleared by his medical doctor prior to surgery.  He also had a negative COVID-19 test prior to surgery.      DESCRIPTION OF PROCEDURE:  The patient was met on the day of the operation in the preoperative holding area.  Left shoulder was marked with his participation.  Informed consent was reviewed in detail, and all questions were answered satisfactorily.  He underwent a regional anesthesia with interscalene nerve block to the left shoulder performed by the Anesthesia  Service in the preoperative holding area.  He was taken to the operating room theater by the Anesthesia Service and placed supine on the operating room table.  He underwent a smooth induction of general endotracheal anesthesia.  He was then placed in the beach chair position with all bony prominences well padded and his neck held in a neutral position of flexion, extension and rotation.  Left upper extremity was then prepped and draped in standard sterile fashion.  A timeout was held in accordance with hospital policy, confirming the correct patient, correct procedure, correct operative side and site.  We also confirmed the patient had on bilateral SCDs for DVT prophylaxis as well as receiving 3 grams of IV cefazolin.      All bony landmarks and portal sites were outlined with a skin marker.  Portals in the subacromial space were injected with Marcaine with epinephrine to improve hemostasis.  Standard anterior and posterior working portals were established.  Diagnostic arthroscopy proceeded, with the findings as listed above.  A combination of motorized shaver and arthroscopic ablator were used to debride the superior and anterior labrum.  Next, the rotator interval was excised and released with a combination of arthroscopic ablator and basket biters.  Next, the anterior capsule was completely released from the top of the subscapularis tendon all the way down to the 6 o'clock position.  Arthroscopic shaver was used to resect part of the anterior capsule.  The capsule was densely adherent and very thickened, consistent with excessive scarring.  Once the arthroscopic capsular release and glenohumeral joint debridement and diagnostic arthroscopy were completed, the scope and trocar were then repositioned in the subacromial space.  Extensive subacromial bursectomy was performed by removing all the adhesions and inflammatory bursa.  The subscapularis, infraspinatus, and the posterior half of the supraspinatus had been  found to be completely healed to the greater tuberosity.  There was a retear of the anterior one-half of the supraspinatus tendon with minimal overall retraction.  The old sutures were removed from the anteromedial greater tuberosity.  This area was lightly decorticated to a bleeding bed of bone.  Two horizontal mattress peg sutures were placed into the anterior edge of the torn and minimally retracted supraspinatus tendon.  Next, a 4.75 mm BioComposite SwiveLock anchor double-loaded was then placed into the anteromedial greater tuberosity.  The sutures were then passed sequentially through the anteromedial supraspinatus tendon.  These sutures were then tensioned and tied, compressing the supraspinatus tendon back into an anatomic position on the greater tuberosity.  The suture tails were left, and then the peg sutures and the suture tails from the anchor were placed into 2 lateral row SwiveLock anchors anterior and posterior to the repair, compressing the rotator cuff tendon against the greater tuberosity with near 100% coverage.  Final probing of the tendon revealed excellent fixation of the tuberosity.  The sutures were then cut flush.  The shoulder was then suctioned dry.  Portals were closed with interrupted 3-0 nylon sutures.  Sterile dressings and a sling were applied.  The patient was taken to the recovery room in stable condition.  There were no complications.      POSTOPERATIVE PLAN:  The patient will be allowed discharge home today by Same-Day Surgery.  He will be in a sling with abduction pillow for 6 weeks following the operation.  He will have no lifting greater than 1 pound for 6 weeks.  He will initiate gentle therapy at the 1-week postoperative jim, start with some gentle passive external rotation at the side from 0-30 degrees.  At the 2-week postoperative jim, we will initiate supine forward elevation 0-130.  Active range of motion of the shoulder will initiate at 6 weeks.  Strengthening will  initiate at 3 months.  The patient will return to clinic to see me at the 2-week, 6-week, 3-month, 6-month and 1-year intervals without radiographs.         PANCHITO CANO MD             D: 2021   T: 2021   MT: WILL      Name:     VALERIE HINES   MRN:      [id number]        Account:        [id number]   :      1970           Procedure Date: 2021      Document: [id number]

## 2021-01-14 NOTE — ANESTHESIA POSTPROCEDURE EVALUATION
Anesthesia POST Procedure Evaluation    Patient: Thomas Pierce   MRN:     [id number] Gender:   male   Age:    50 year old :      1970        Preoperative Diagnosis: Complete tear of left rotator cuff, unspecified whether traumatic [M75.122]   Procedure(s):  LEFT ARTHROSCOPY, SHOULDER, WITH REVISION ROTATOR CUFF REPAIR, SUBACROMIAL DECOMPRESSION AND CUPSULAR RELEASE   Postop Comments: No value filed.     Anesthesia Type: General          Postop Pain Control: Uneventful            Sign Out: Well controlled pain   PONV: No   Neuro/Psych: Uneventful            Sign Out: Acceptable/Baseline neuro status   Airway/Respiratory: Uneventful            Sign Out: Acceptable/Baseline resp. status   CV/Hemodynamics: Uneventful            Sign Out: Acceptable CV status   Other NRE: NONE   DID A NON-ROUTINE EVENT OCCUR? No         Last Anesthesia Record Vitals:  CRNA VITALS  2021 1626 - 2021 1726      2021             NIBP:  108/70    Pulse:  81    Temp:  37  C (98.6  F)    SpO2:  97 %          Last PACU Vitals:  Vitals Value Taken Time   /66 21 1830   Temp 36.9  C (98.4  F) 21 1730   Pulse 94 21 1830   Resp 7 21 1830   SpO2 94 % 21 1830   Temp src     NIBP 108/70 21 1700   Pulse 81 21 1700   SpO2 97 % 21 1700   Resp     Temp 37  C (98.6  F) 21 1700   Ht Rate     Temp 2     Vitals shown include unvalidated device data.      Electronically Signed By: Solomon Thomas MD, 2021, 7:41 PM

## 2021-01-14 NOTE — PROGRESS NOTES
Talked with Nick's wife, Lucy, over the phone. Went through discharge instructions and answered questions. Ready for discharge.

## 2021-01-14 NOTE — TELEPHONE ENCOUNTER
SURGEON POST-OP TELEPHONE CALL    DATE OF SURGERY: 1/13/2021    OPERATION PERFORMED: Left shoulder diagnostic arthroscopy, capsular release and resection, arthroscopic revision rotator cuff repair of the supraspinatus tendon    I called Thomas this afternoon to check in on him. Thomas states that he is doing well with no issues. Block is slowly wearing off and pain is controlled. Thomas denies any fevers, chills, chest pain, shortness of breath, nausea or vomiting. No questions or concerns regarding any of the post-operative instructions at this time.     At the conclusion of the phone call, Thomas verbally acknowledged that I answered all of his questions satisfactorily.

## 2021-01-14 NOTE — CONSULTS
HOSPITALIST INITIAL CONSULT NOTE    Referring Provider:  David Boyd MD      Reason for Consult         History of Present Illness     Thomas Pierce is 50 year old year old male with hx of HTN, DM, HLD, Obesity is being admitted s/p Left shoulder arthroscopy with revision rotator cuff repair, subacromial decompression, and capsular release on 01/13/2021 for left rotator cuff tear on 01/13/2021  EBL 5 ml.  ml     Currently reports pain is controlled. Denies any nausea, vomiting, chest pain, shortness of breath, lightheadedness or dizziness.           Past Medical History     Past Medical History:   Diagnosis Date     Hyperlipidemia LDL goal <160 10/20/2011     Hypertension goal BP (blood pressure) < 140/90 10/20/2011     Prediabetes           Past Surgical History     Past Surgical History:   Procedure Laterality Date     ARTHROSCOPY SHOULDER ROTATOR CUFF REPAIR, BICEP TENODESIS REPAIR Left 5/12/2020    Procedure: LEFT SHOULDER ARTHROSCOPIC ROTATOR CUFF REPAIR, BICEPS TENODESIS, SUBACROMIAL DECOMPRESSION,  DEBRIDEMENT;  Surgeon: David Boyd MD;  Location: [facility]     MANDIBLE SURGERY  1978    trauma while sledding          Medications     All his current medications are reviewed in current medication section of "[employer].  Home medications are reviewed.  Current Facility-Administered Medications   Medication     acetaminophen (TYLENOL) tablet 650 mg     bupivacaine 0.5 % -  EPINEPHrine 1:200,000 injection     EPINEPHrine 10 mg (10 mL of 1:1,000) in 3000 mL saline     fentaNYL (PF) (SUBLIMAZE) injection 25-50 mcg     HYDROmorphone (PF) (DILAUDID) injection 0.3-0.5 mg     hydrOXYzine (ATARAX) tablet 25 mg     labetalol (NORMODYNE/TRANDATE) injection 10 mg     lactated ringers infusion     naloxone (NARCAN) injection 0.2 mg    Or     naloxone (NARCAN) injection 0.4 mg    Or     naloxone (NARCAN) injection 0.2 mg    Or     naloxone (NARCAN) injection 0.4 mg     naloxone (NARCAN) injection 0.2  mg     naloxone (NARCAN) injection 0.2 mg     naloxone (NARCAN) injection 0.4 mg     naloxone (NARCAN) injection 0.4 mg     ondansetron (ZOFRAN-ODT) ODT tab 4 mg    Or     ondansetron (ZOFRAN) injection 4 mg     ondansetron (ZOFRAN-ODT) ODT tab 4 mg     oxyCODONE (ROXICODONE) tablet 5 mg     prochlorperazine (COMPAZINE) injection 10 mg        Allergies      No Known Allergies     Family History     Family History   Problem Relation Age of Onset     Diabetes Mother         Type II     Hypertension Mother      Hyperlipidemia Mother      Cardiovascular Maternal Grandmother         PAD     Hepatitis Maternal Grandmother      Colon Polyps Cousin      Ovarian Cancer Maternal Aunt      Colon Cancer No family hx of      Prostate Cancer No family hx of      Coronary Artery Disease No family hx of      Cerebrovascular Disease No family hx of           Social History     Social History     Socioeconomic History     Marital status:      Spouse name: Lucy     Number of children: 0     Years of education: Not on file     Highest education level: Not on file   Occupational History     Occupation:    Social Needs     Financial resource strain: Not on file     Food insecurity     Worry: Not on file     Inability: Not on file     Transportation needs     Medical: Not on file     Non-medical: Not on file   Tobacco Use     Smoking status: Never Smoker     Smokeless tobacco: Never Used   Substance and Sexual Activity     Alcohol use: Yes     Comment: occasionally      Drug use: No     Sexual activity: Never   Lifestyle     Physical activity     Days per week: Not on file     Minutes per session: Not on file     Stress: Not on file   Relationships     Social connections     Talks on phone: Not on file     Gets together: Not on file     Attends [employer]ism service: Not on file     Active member of club or organization: Not on file     Attends meetings of clubs or organizations: Not on file     Relationship  "status: Not on file     Intimate partner violence     Fear of current or ex partner: Not on file     Emotionally abused: Not on file     Physically abused: Not on file     Forced sexual activity: Not on file   Other Topics Concern     Parent/sibling w/ CABG, MI or angioplasty before 65F 55M? No   Social History Narrative     Not on file        Review of Systems   A 10 point review of systems was taken and largely negative except for that which was stated above.      Physical Exam       Vital signs:    Blood pressure 106/66, pulse 93, temperature 98.4  F (36.9  C), temperature source Axillary, resp. rate 18, height 1.829 m (6' 0.01\"), weight 124.4 kg (274 lb 4 oz), SpO2 95 %.  Estimated body mass index is 37.19 kg/m  as calculated from the following:    Height as of this encounter: 1.829 m (6' 0.01\").    Weight as of this encounter: 124.4 kg (274 lb 4 oz).      Intake/Output Summary (Last 24 hours) at 1/13/2021 1919  Last data filed at 1/13/2021 1836  Gross per 24 hour   Intake 720 ml   Output --   Net 720 ml      Constitutional: Laying in bed in no acute distress  Eye: No icterus, no pallor  Mouth/ENT:   Cardiovascular: S1, S2 normal  Respiratory: B/L CTA  GI: Soft, NT, BS+  :   Neurology: Alert, awake, and oriented. No tremors  Psych:   MSK: Left shoulder dressing in place.   Integumentary:   Heme/Lymph/Imm:        Laboratory and Imaging Studies     Laboratory and Imaging studies reviewed in the results review section of Epic. Pertinent studies are as below:    CMP  Recent Labs   Lab 01/13/21  1219 01/07/21  1739   NA  --  134   POTASSIUM 4.2 3.8   CHLORIDE  --  103   CO2  --  25   ANIONGAP  --  6   GLC  --  157*   BUN  --  19   CR  --  0.84   GFRESTIMATED  --  >90   GFRESTBLACK  --  >90   KATLYN  --  9.1   PROTTOTAL  --  7.6   ALBUMIN  --  4.0   BILITOTAL  --  0.5   ALKPHOS  --  73   AST  --  40   ALT  --  83*     CBC  Recent Labs   Lab 01/07/21  1739   WBC 9.5   RBC 5.44   HGB 16.4   HCT 45.4   MCV 84   MCH 30.1 "   MCHC 36.1   RDW 12.3        INRNo lab results found in last 7 days.  Arterial Blood GasNo lab results found in last 7 days.       Impression/Recommendations     50 year old year old male with hx of HTN, DM, HLD, Obesity is being admitted s/p Left shoulder arthroscopy with revision rotator cuff repair, subacromial decompression, and capsular release on 01/13/2021 for left rotator cuff tear on 01/13/2021      # S/p Left rotator cuff repair (as above) on 01/13/2021:  EBL 5 ml.  ml   On Analgesics, and dressing in place  - Wound cares, Dressings, Surgical pain management, DVT Prophylaxis  per primary team.   - Monitor anemia, hemodynamics, Input/Output  - Monitor Capnogrphy  - Encourage Incentive spirometry  - Laxatives for constipation prophylaxis    # DM 2: PTA on Metformin. Not on Insulin  - Hold Metformin, likely start in AM  - In the interim, will place on Insulin Aspart medium intensity correction    # HTN: PTA on Lisinopril, Chlorthalidone  - Hold Lisinopril & Chlorthalidone  - BMP in AM  - Monitor and initiate/titrate    # HLD: On Rosuvastatin  - Continue    # GERD: On Omeprazole  - Continue                 Patrick Elgin  Internal Medicine/Hospitalist  [facility]  [phone]

## 2021-01-15 ENCOUNTER — MYC MEDICAL ADVICE (OUTPATIENT)
Dept: FAMILY MEDICINE | Facility: CLINIC | Age: 51
End: 2021-01-15

## 2021-01-15 ENCOUNTER — HEALTH MAINTENANCE LETTER (OUTPATIENT)
Age: 51
End: 2021-01-15

## 2021-01-19 ENCOUNTER — THERAPY VISIT (OUTPATIENT)
Dept: PHYSICAL THERAPY | Facility: CLINIC | Age: 51
End: 2021-01-19
Payer: OTHER MISCELLANEOUS

## 2021-01-19 DIAGNOSIS — Z98.890 S/P ARTHROSCOPY OF LEFT SHOULDER: ICD-10-CM

## 2021-01-19 DIAGNOSIS — M25.512 SHOULDER PAIN, LEFT: ICD-10-CM

## 2021-01-19 DIAGNOSIS — Z98.890 S/P ARTHROSCOPY OF LEFT SHOULDER: Primary | ICD-10-CM

## 2021-01-19 DIAGNOSIS — Z47.89 AFTERCARE FOLLOWING SURGERY OF THE MUSCULOSKELETAL SYSTEM: Primary | ICD-10-CM

## 2021-01-19 PROCEDURE — 97010 HOT OR COLD PACKS THERAPY: CPT | Mod: GP | Performed by: PHYSICAL THERAPIST

## 2021-01-19 PROCEDURE — 97110 THERAPEUTIC EXERCISES: CPT | Mod: GP | Performed by: PHYSICAL THERAPIST

## 2021-01-19 PROCEDURE — 97161 PT EVAL LOW COMPLEX 20 MIN: CPT | Mod: GP | Performed by: PHYSICAL THERAPIST

## 2021-01-22 ENCOUNTER — THERAPY VISIT (OUTPATIENT)
Dept: PHYSICAL THERAPY | Facility: CLINIC | Age: 51
End: 2021-01-22
Payer: OTHER MISCELLANEOUS

## 2021-01-22 DIAGNOSIS — Z47.89 AFTERCARE FOLLOWING SURGERY OF THE MUSCULOSKELETAL SYSTEM: ICD-10-CM

## 2021-01-22 DIAGNOSIS — M25.512 SHOULDER PAIN, LEFT: ICD-10-CM

## 2021-01-22 PROCEDURE — 97010 HOT OR COLD PACKS THERAPY: CPT | Mod: GP | Performed by: PHYSICAL THERAPIST

## 2021-01-22 PROCEDURE — 97110 THERAPEUTIC EXERCISES: CPT | Mod: GP | Performed by: PHYSICAL THERAPIST

## 2021-01-24 NOTE — PROGRESS NOTES
[facility] Initial Evaluation  Subjective:    Therapist Generated HPI Evaluation  Problem details: Nick is being seen for rehabilitation s/p left shoulder revision rotator cuff repair, subacromial decompression, and capsular release on 1/13/2021.  Post surgical rehabilitation plan is to focus on restoring passive flexion and ER as soon as possible to avoid capsular hypertrophy which limited motion following primary RCR..         Type of problem:  Left shoulder.    This is a new condition.        Pain is described as aching (3/10) and is intermittent.  Pain is the same all the time.  Since onset symptoms are gradually improving.  Symptoms are exacerbated by using arm at shoulder level, using arm overhead, lifting, lying on extremity and using arm behind back  and relieved by bracing/immobilizing.    Previous treatment includes surgery. There was significant (reports the pain is better that prior to revision surgery) improvement following previous treatment.                          Objective:  Standing Alignment:      Shoulder/upper extremity deviations alignment: shoulder immobilizer.              Gait:    Assistive Devices:  None      Flexibility/Screens:   Positive screens:  Shoulder                             Shoulder Evaluation:  ROM:  AROM:  not assessed                            PROM:    Flexion:  Left:  95          Abduction:  Left:  75          External Rotation:  Left:  35                        Strength:  not assessed                                                               General     ROS    Assessment/Plan:    Patient is a 50 year old male with left side shoulder complaints.    Patient has the following significant findings with corresponding treatment plan.                Diagnosis 1:  S/p Left Shoulder Revision Rotator Cuff Repair, SAD, capsular release  Pain -  hot/cold therapy, self management, education and home program  Decreased ROM/flexibility - manual therapy,  therapeutic exercise, therapeutic activity and home program  Decreased strength - therapeutic exercise and therapeutic activities  Impaired muscle performance - neuro re-education  Decreased function - therapeutic activities    Therapy Evaluation Codes:   1) History comprised of:   Personal factors that impact the plan of care:      None.    Comorbidity factors that impact the plan of care are:      None.     Medications impacting care: Pain.  2) Examination of Body Systems comprised of:   Body structures and functions that impact the plan of care:      Shoulder.   Activity limitations that impact the plan of care are:      Bathing, Cooking, Driving, Dressing, Lifting, Working, Sleeping and Laying down.  3) Clinical presentation characteristics are:   Stable/Uncomplicated.  4) Decision-Making    Low complexity using standardized patient assessment instrument and/or measureable assessment of functional outcome.  Cumulative Therapy Evaluation is: Low complexity.    Previous and current functional limitations:  (See Goal Flow Sheet for this information)    Short term and Long term goals: (See Goal Flow Sheet for this information)     Communication ability:  Patient appears to be able to clearly communicate and understand verbal and written communication and follow directions correctly.  Treatment Explanation - The following has been discussed with the patient:   RX ordered/plan of care  Anticipated outcomes  Possible risks and side effects  This patient would benefit from PT intervention to resume normal activities.   Rehab potential is good.    Frequency:  2 X week, once daily  Duration:  for 6 weeks tapering to 1 x week for 8 weeks  Discharge Plan:  Achieve all LTG.  Independent in home treatment program.  Reach maximal therapeutic benefit.    Please refer to the daily flowsheet for treatment today, total treatment time and time spent performing 1:1 timed codes.

## 2021-01-26 ENCOUNTER — THERAPY VISIT (OUTPATIENT)
Dept: PHYSICAL THERAPY | Facility: CLINIC | Age: 51
End: 2021-01-26
Payer: OTHER MISCELLANEOUS

## 2021-01-26 ENCOUNTER — OFFICE VISIT (OUTPATIENT)
Dept: ORTHOPEDICS | Facility: CLINIC | Age: 51
End: 2021-01-26
Payer: OTHER MISCELLANEOUS

## 2021-01-26 VITALS
HEIGHT: 72 IN | BODY MASS INDEX: 37.11 KG/M2 | DIASTOLIC BLOOD PRESSURE: 70 MMHG | WEIGHT: 274 LBS | SYSTOLIC BLOOD PRESSURE: 109 MMHG

## 2021-01-26 DIAGNOSIS — M25.512 SHOULDER PAIN, LEFT: ICD-10-CM

## 2021-01-26 DIAGNOSIS — Z47.89 AFTERCARE FOLLOWING SURGERY OF THE MUSCULOSKELETAL SYSTEM: ICD-10-CM

## 2021-01-26 DIAGNOSIS — Z98.890 S/P ARTHROSCOPY OF LEFT SHOULDER: Primary | ICD-10-CM

## 2021-01-26 PROCEDURE — 97110 THERAPEUTIC EXERCISES: CPT | Mod: GP | Performed by: PHYSICAL THERAPIST

## 2021-01-26 PROCEDURE — 97010 HOT OR COLD PACKS THERAPY: CPT | Mod: GP | Performed by: PHYSICAL THERAPIST

## 2021-01-26 PROCEDURE — 99024 POSTOP FOLLOW-UP VISIT: CPT | Performed by: ORTHOPAEDIC SURGERY

## 2021-01-26 ASSESSMENT — MIFFLIN-ST. JEOR: SCORE: 2130.86

## 2021-01-26 NOTE — LETTER
January 26th, 2021      Thomas Pierce  [address] [address]        To Whom It May Concern:    Thomas Pierce was seen in our clinic. He may return to work  On 1/27/21 with the following: limited to light duty - No use of left arm.  No pushing, pulling or carrying with left arm. Must wear sling at all times.  No climbing, or crawling.  He will follow up with my office in 3 weeks for reevaluation and progression of activities.       Sincerely,        David Boyd MD

## 2021-01-26 NOTE — PROGRESS NOTES
ORTHOPEDIC SURGERY POST-OPERATIVE VISIT NOTE    DATE OF SURGERY: 1/13/2021    OPERATION PERFORMED:  Left shoulder diagnostic arthroscopy, capsular release and resection, arthroscopic revision rotator cuff repair of the supraspinatus tendon    INTERVAL HISTORY: Thomas is a 50 year old, right-hand dominant male who returns today now 2 weeks out from the above operation. Overall, Thomas reports that he is doing well. Thomas is currently not using anything for pain control and states that he has been compliant with all post-operative restrictions. No intercurrent trauma. Denies any numbness, tingling, or weakness. No other issues or concerns.    EXAM:  Constitutional: Well-developed, well-nourished, healthy appearing male.  Skin: Warm, dry, and without rashes.   Cardiac: Well perfused extremities, strong 2+ peripheral pulses. No edema.   Pulmonary: Non-labored respirations on room air without audible wheezes.   Musculoskeletal: Focused examination of the left shoulder demonstrates a sling abduction pillow that have been removed.  Nylon sutures been removed.  Steri-Strips placed.  No redness, warmth, erythema, induration, or signs of infection.  No wound drainage.  Neurovascular exam is normal and intact.  Full passive elbow motion is present.  Gentle external rotation at the side tolerated to 35 degrees, gentle forward elevation with passive range of motion of 90 degrees.     IMAGING:   No new imaging obtained.  Previous imaging reviewed.    IMPRESSION: 50 year old-year-old right hand dominant male now 2 weeks status-post the above. Overall doing very well.    PLAN:   I had a nice discussion with Thomas today.  At the present time he may continue to use over-the-counter analgesics and icing as needed for pain and discomfort.  It is absolutely critical for him to continue to work with his physical therapy.  I would like him to really push his external rotation at this time and therapy.  He also may be  begin progressing supine passive and active assisted forward elevation.  Continue the sling for another 4 weeks.    I would also like him to work with his primary care provider to work on improving his diabetic control.  Last hemoglobin A1c was 8.6.    Return to clinic in 3 to 4 weeks for repeat clinical exam and first motion check.    At the conclusion of the office visit, Thomas verbally acknowledged that I answered all of his questions satisfactorily.

## 2021-01-26 NOTE — LETTER
January, 2021      Thomas Pierce  [address] [facility] [address]        To Whom It May Concern:    Thomas Pierce was seen in our clinic. He may return to work  On 1/27/21 with the following: limited to light duty - No use of left arm.  No pushing, pulling or carrying with left arm. Must wear sling at all times.  No climbing, or crawling.  He will follow up with my office in 3 weeks for reevaluation and progression of activities.       Sincerely,        David Boyd MD

## 2021-01-28 ENCOUNTER — OFFICE VISIT (OUTPATIENT)
Dept: FAMILY MEDICINE | Facility: CLINIC | Age: 51
End: 2021-01-28
Payer: COMMERCIAL

## 2021-01-28 VITALS
DIASTOLIC BLOOD PRESSURE: 70 MMHG | BODY MASS INDEX: 36.87 KG/M2 | SYSTOLIC BLOOD PRESSURE: 110 MMHG | WEIGHT: 272.2 LBS | TEMPERATURE: 97.9 F | OXYGEN SATURATION: 97 % | HEART RATE: 98 BPM | HEIGHT: 72 IN

## 2021-01-28 DIAGNOSIS — E78.5 HYPERLIPIDEMIA LDL GOAL <100: ICD-10-CM

## 2021-01-28 DIAGNOSIS — R42 LIGHTHEADEDNESS: Primary | ICD-10-CM

## 2021-01-28 DIAGNOSIS — E11.9 TYPE 2 DIABETES MELLITUS WITHOUT COMPLICATION, WITHOUT LONG-TERM CURRENT USE OF INSULIN (H): ICD-10-CM

## 2021-01-28 DIAGNOSIS — I10 HYPERTENSION GOAL BP (BLOOD PRESSURE) < 140/90: ICD-10-CM

## 2021-01-28 DIAGNOSIS — R09.89 CHRONIC THROAT CLEARING: ICD-10-CM

## 2021-01-28 LAB
ALBUMIN SERPL-MCNC: 3.6 G/DL (ref 3.4–5)
ALP SERPL-CCNC: 84 U/L (ref 40–150)
ALT SERPL W P-5'-P-CCNC: 46 U/L (ref 0–70)
ANION GAP SERPL CALCULATED.3IONS-SCNC: 6 MMOL/L (ref 3–14)
AST SERPL W P-5'-P-CCNC: 22 U/L (ref 0–45)
BASOPHILS # BLD AUTO: 0 10E9/L (ref 0–0.2)
BASOPHILS NFR BLD AUTO: 0.4 %
BILIRUB SERPL-MCNC: 0.4 MG/DL (ref 0.2–1.3)
BUN SERPL-MCNC: 21 MG/DL (ref 7–30)
CALCIUM SERPL-MCNC: 9.9 MG/DL (ref 8.5–10.1)
CHLORIDE SERPL-SCNC: 103 MMOL/L (ref 94–109)
CHOLEST SERPL-MCNC: 164 MG/DL
CO2 SERPL-SCNC: 25 MMOL/L (ref 20–32)
CREAT SERPL-MCNC: 0.82 MG/DL (ref 0.66–1.25)
CREAT UR-MCNC: 151 MG/DL
CRP SERPL-MCNC: 35 MG/L (ref 0–8)
DIFFERENTIAL METHOD BLD: NORMAL
EOSINOPHIL # BLD AUTO: 0.2 10E9/L (ref 0–0.7)
EOSINOPHIL NFR BLD AUTO: 2.1 %
ERYTHROCYTE [DISTWIDTH] IN BLOOD BY AUTOMATED COUNT: 12.1 % (ref 10–15)
ERYTHROCYTE [SEDIMENTATION RATE] IN BLOOD BY WESTERGREN METHOD: 20 MM/H (ref 0–20)
GFR SERPL CREATININE-BSD FRML MDRD: >90 ML/MIN/{1.73_M2}
GLUCOSE SERPL-MCNC: 162 MG/DL (ref 70–99)
HCT VFR BLD AUTO: 43.4 % (ref 40–53)
HDLC SERPL-MCNC: 46 MG/DL
HGB BLD-MCNC: 15.1 G/DL (ref 13.3–17.7)
LDLC SERPL CALC-MCNC: 92 MG/DL
LYMPHOCYTES # BLD AUTO: 2.5 10E9/L (ref 0.8–5.3)
LYMPHOCYTES NFR BLD AUTO: 23.7 %
MCH RBC QN AUTO: 30 PG (ref 26.5–33)
MCHC RBC AUTO-ENTMCNC: 34.8 G/DL (ref 31.5–36.5)
MCV RBC AUTO: 86 FL (ref 78–100)
MICROALBUMIN UR-MCNC: 8 MG/L
MICROALBUMIN/CREAT UR: 5.41 MG/G CR (ref 0–17)
MONOCYTES # BLD AUTO: 0.6 10E9/L (ref 0–1.3)
MONOCYTES NFR BLD AUTO: 5.7 %
NEUTROPHILS # BLD AUTO: 7.3 10E9/L (ref 1.6–8.3)
NEUTROPHILS NFR BLD AUTO: 68.1 %
NONHDLC SERPL-MCNC: 118 MG/DL
PLATELET # BLD AUTO: 306 10E9/L (ref 150–450)
POTASSIUM SERPL-SCNC: 4.1 MMOL/L (ref 3.4–5.3)
PROT SERPL-MCNC: 7.9 G/DL (ref 6.8–8.8)
RBC # BLD AUTO: 5.03 10E12/L (ref 4.4–5.9)
SODIUM SERPL-SCNC: 134 MMOL/L (ref 133–144)
TRIGL SERPL-MCNC: 130 MG/DL
WBC # BLD AUTO: 10.7 10E9/L (ref 4–11)

## 2021-01-28 PROCEDURE — 99215 OFFICE O/P EST HI 40 MIN: CPT | Mod: 25 | Performed by: PHYSICIAN ASSISTANT

## 2021-01-28 PROCEDURE — 82043 UR ALBUMIN QUANTITATIVE: CPT | Performed by: PHYSICIAN ASSISTANT

## 2021-01-28 PROCEDURE — 36415 COLL VENOUS BLD VENIPUNCTURE: CPT | Performed by: PHYSICIAN ASSISTANT

## 2021-01-28 PROCEDURE — 90750 HZV VACC RECOMBINANT IM: CPT | Performed by: PHYSICIAN ASSISTANT

## 2021-01-28 PROCEDURE — 80053 COMPREHEN METABOLIC PANEL: CPT | Performed by: PHYSICIAN ASSISTANT

## 2021-01-28 PROCEDURE — 85652 RBC SED RATE AUTOMATED: CPT | Performed by: PHYSICIAN ASSISTANT

## 2021-01-28 PROCEDURE — 80061 LIPID PANEL: CPT | Performed by: PHYSICIAN ASSISTANT

## 2021-01-28 PROCEDURE — 90471 IMMUNIZATION ADMIN: CPT | Performed by: PHYSICIAN ASSISTANT

## 2021-01-28 PROCEDURE — 86140 C-REACTIVE PROTEIN: CPT | Performed by: PHYSICIAN ASSISTANT

## 2021-01-28 PROCEDURE — 85025 COMPLETE CBC W/AUTO DIFF WBC: CPT | Performed by: PHYSICIAN ASSISTANT

## 2021-01-28 RX ORDER — METFORMIN HCL 500 MG
2000 TABLET, EXTENDED RELEASE 24 HR ORAL AT BEDTIME
Qty: 360 TABLET | Refills: 1 | Status: SHIPPED | OUTPATIENT
Start: 2021-01-28 | End: 2021-05-06

## 2021-01-28 RX ORDER — DULAGLUTIDE 0.75 MG/.5ML
0.75 INJECTION, SOLUTION SUBCUTANEOUS
Qty: 2 ML | Refills: 2 | Status: SHIPPED | OUTPATIENT
Start: 2021-01-28 | End: 2021-05-06

## 2021-01-28 RX ORDER — ROSUVASTATIN CALCIUM 20 MG/1
20 TABLET, COATED ORAL AT BEDTIME
Qty: 90 TABLET | Refills: 1 | Status: SHIPPED | OUTPATIENT
Start: 2021-01-28 | End: 2022-04-11

## 2021-01-28 RX ORDER — LISINOPRIL 40 MG/1
40 TABLET ORAL DAILY
Qty: 90 TABLET | Refills: 1 | Status: SHIPPED | OUTPATIENT
Start: 2021-01-28 | End: 2021-05-06

## 2021-01-28 RX ORDER — FLUTICASONE PROPIONATE 50 MCG
2 SPRAY, SUSPENSION (ML) NASAL DAILY
Start: 2021-01-28 | End: 2021-07-09

## 2021-01-28 RX ORDER — CETIRIZINE HYDROCHLORIDE 10 MG/1
10 TABLET ORAL DAILY
Qty: 14 TABLET | Refills: 0
Start: 2021-01-28 | End: 2021-02-11

## 2021-01-28 RX ORDER — CHLORTHALIDONE 25 MG/1
25 TABLET ORAL DAILY
Qty: 90 TABLET | Refills: 1 | Status: SHIPPED | OUTPATIENT
Start: 2021-01-28 | End: 2021-05-06

## 2021-01-28 ASSESSMENT — MIFFLIN-ST. JEOR: SCORE: 2122.69

## 2021-01-28 NOTE — PROGRESS NOTES
"  {PROVIDER CHARTING PREFERENCE:112050}    Belkis Guzman is a 50 year old who presents to clinic today for the following health issues {ACCOMPANIED BY STATEMENT (Optional):899796}    HPI       Diabetes Follow-up      How often are you checking your blood sugar? { :149412}    What concerns do you have today about your diabetes? { :689998::\"None\"}     Do you have any of these symptoms? (Select all that apply)  { :415658}    Have you had a diabetic eye exam in the last 12 months? { :649352}        BP Readings from Last 2 Encounters:   01/26/21 109/70   01/13/21 105/73     Hemoglobin A1C (%)   Date Value   01/07/2021 8.6 (H)   05/07/2020 8.3 (H)     LDL Cholesterol Calculated (mg/dL)   Date Value   05/07/2020 110 (H)   11/04/2019 149 (H)       {Reference  Diabetes Management Resources :795430}    {Reference  Diabetes Log - 7 days :945212}    Hypertension Follow-up      Do you check your blood pressure regularly outside of the clinic? { :069863}     Are you following a low salt diet? { :375998}    Are your blood pressures ever more than 140 on the top number (systolic) OR more   than 90 on the bottom number (diastolic), for example 140/90? { :408282}      How many servings of fruits and vegetables do you eat daily?  { :917531}    On average, how many sweetened beverages do you drink each day (Examples: soda, juice, sweet tea, etc.  Do NOT count diet or artificially sweetened beverages)?   { 1-11:360134}    How many days per week do you exercise enough to make your heart beat faster? { :897238}    How many minutes a day do you exercise enough to make your heart beat faster? { :847391}    How many days per week do you miss taking your medication? {0-7 :507823}    {additonal problems for provider to add (Optional):571788}    Review of Systems   {ROS COMP (Optional):762269}      Objective    There were no vitals taken for this visit.  There is no height or weight on file to calculate BMI.  Physical Exam "   {Exam List (Optional):081109}    {Diagnostic Test Results (Optional):849205}    {AMBULATORY ATTESTATION (Optional):935267}

## 2021-01-29 ENCOUNTER — THERAPY VISIT (OUTPATIENT)
Dept: PHYSICAL THERAPY | Facility: CLINIC | Age: 51
End: 2021-01-29
Payer: OTHER MISCELLANEOUS

## 2021-01-29 DIAGNOSIS — M25.512 SHOULDER PAIN, LEFT: ICD-10-CM

## 2021-01-29 DIAGNOSIS — Z47.89 AFTERCARE FOLLOWING SURGERY OF THE MUSCULOSKELETAL SYSTEM: ICD-10-CM

## 2021-01-29 PROCEDURE — 97110 THERAPEUTIC EXERCISES: CPT | Mod: GP | Performed by: PHYSICAL THERAPIST

## 2021-01-29 NOTE — RESULT ENCOUNTER NOTE
Thomas  I have reviewed your recent labs. Here are the results:    -Normal red blood cell (hgb) levels, normal white blood cell count and normal platelet levels.  -Cholesterol levels are at your goal levels (markedly improved from last year!).  ADVISE: continuing your medication, a regular exercise program with at least 150 minutes of aerobic exercise per week, and eating a low saturated fat/low carbohydrate diet.  Also, you should recheck this fasting cholesterol panel in 12 months.  -Liver and gallbladder tests are normal (ALT,AST, Alk phos, bilirubin), kidney function is normal (Cr, GFR), sodium is normal, potassium is normal, calcium is normal, glucose is elevated from your diabetes.  -Microalbumin (urine protein) test is normal.  ADVISE: rechecking this annually.  -ESR is normal (inflammatory marker)  -CRP is slightly elevated indicating some type of inflammation - this could certainly be post-surgical.  If your lightheadedness does not improve we should repeat this and discuss this with your surgeon.    Send me an update on your lightheadedness/sensation of being unwell next week.     If you have any questions please do not hesitate to contact our office via phone ([phone]) or MyChart.    Jasmyn Giles, MS, PA-C  [facility] [place]

## 2021-02-02 ENCOUNTER — THERAPY VISIT (OUTPATIENT)
Dept: PHYSICAL THERAPY | Facility: CLINIC | Age: 51
End: 2021-02-02
Payer: OTHER MISCELLANEOUS

## 2021-02-02 DIAGNOSIS — M25.512 SHOULDER PAIN, LEFT: ICD-10-CM

## 2021-02-02 DIAGNOSIS — Z47.89 AFTERCARE FOLLOWING SURGERY OF THE MUSCULOSKELETAL SYSTEM: ICD-10-CM

## 2021-02-02 PROCEDURE — 97110 THERAPEUTIC EXERCISES: CPT | Mod: GP | Performed by: PHYSICAL THERAPIST

## 2021-02-05 ENCOUNTER — THERAPY VISIT (OUTPATIENT)
Dept: PHYSICAL THERAPY | Facility: CLINIC | Age: 51
End: 2021-02-05
Payer: OTHER MISCELLANEOUS

## 2021-02-05 DIAGNOSIS — Z47.89 AFTERCARE FOLLOWING SURGERY OF THE MUSCULOSKELETAL SYSTEM: ICD-10-CM

## 2021-02-05 DIAGNOSIS — M25.512 SHOULDER PAIN, LEFT: ICD-10-CM

## 2021-02-05 PROCEDURE — 97110 THERAPEUTIC EXERCISES: CPT | Mod: GP | Performed by: PHYSICAL THERAPIST

## 2021-02-09 ENCOUNTER — THERAPY VISIT (OUTPATIENT)
Dept: PHYSICAL THERAPY | Facility: CLINIC | Age: 51
End: 2021-02-09
Payer: OTHER MISCELLANEOUS

## 2021-02-09 DIAGNOSIS — M25.512 SHOULDER PAIN, LEFT: ICD-10-CM

## 2021-02-09 DIAGNOSIS — Z47.89 AFTERCARE FOLLOWING SURGERY OF THE MUSCULOSKELETAL SYSTEM: ICD-10-CM

## 2021-02-09 PROCEDURE — 97110 THERAPEUTIC EXERCISES: CPT | Mod: GP | Performed by: PHYSICAL THERAPIST

## 2021-02-12 ENCOUNTER — THERAPY VISIT (OUTPATIENT)
Dept: PHYSICAL THERAPY | Facility: CLINIC | Age: 51
End: 2021-02-12
Payer: OTHER MISCELLANEOUS

## 2021-02-12 DIAGNOSIS — M25.512 SHOULDER PAIN, LEFT: ICD-10-CM

## 2021-02-12 DIAGNOSIS — Z47.89 AFTERCARE FOLLOWING SURGERY OF THE MUSCULOSKELETAL SYSTEM: ICD-10-CM

## 2021-02-12 PROCEDURE — 97110 THERAPEUTIC EXERCISES: CPT | Mod: GP | Performed by: PHYSICAL THERAPIST

## 2021-02-16 ENCOUNTER — OFFICE VISIT (OUTPATIENT)
Dept: ORTHOPEDICS | Facility: CLINIC | Age: 51
End: 2021-02-16
Payer: OTHER MISCELLANEOUS

## 2021-02-16 ENCOUNTER — THERAPY VISIT (OUTPATIENT)
Dept: PHYSICAL THERAPY | Facility: CLINIC | Age: 51
End: 2021-02-16
Payer: OTHER MISCELLANEOUS

## 2021-02-16 VITALS
DIASTOLIC BLOOD PRESSURE: 74 MMHG | BODY MASS INDEX: 36.98 KG/M2 | HEIGHT: 72 IN | WEIGHT: 273 LBS | SYSTOLIC BLOOD PRESSURE: 112 MMHG

## 2021-02-16 DIAGNOSIS — Z47.89 AFTERCARE FOLLOWING SURGERY OF THE MUSCULOSKELETAL SYSTEM: ICD-10-CM

## 2021-02-16 DIAGNOSIS — Z98.890 S/P ARTHROSCOPY OF LEFT SHOULDER: Primary | ICD-10-CM

## 2021-02-16 DIAGNOSIS — M25.512 SHOULDER PAIN, LEFT: ICD-10-CM

## 2021-02-16 PROCEDURE — 97112 NEUROMUSCULAR REEDUCATION: CPT | Mod: GP | Performed by: PHYSICAL THERAPIST

## 2021-02-16 PROCEDURE — 97110 THERAPEUTIC EXERCISES: CPT | Mod: GP | Performed by: PHYSICAL THERAPIST

## 2021-02-16 PROCEDURE — 99024 POSTOP FOLLOW-UP VISIT: CPT | Performed by: ORTHOPAEDIC SURGERY

## 2021-02-16 ASSESSMENT — MIFFLIN-ST. JEOR: SCORE: 2126.32

## 2021-02-16 NOTE — LETTER
February16, 2021      Thomas Pierce  [address] [address]        To Whom It May Concern:    Thomas Pierce was seen in our clinic. He may return to work with the following: limited to light duty - No use of left arm.  No pushing, pulling or carrying with left arm. Must wear sling at all times.  No climbing, or crawling.  He will follow up with my office in 7 weeks for reevaluation and progression of activities.        Sincerely,        David Boyd MD

## 2021-02-16 NOTE — LETTER
2/16/2021         RE: Thomas Pierce  [address] [address]        Dear Colleague,    Thank you for referring your patient, Thomas Pierce, to the [facility] [place]. Please see a copy of my visit note below.    ORTHOPEDIC SURGERY POST-OPERATIVE VISIT NOTE    DATE OF SURGERY: 1/13/2021    OPERATION PERFORMED:  Left shoulder diagnostic arthroscopy, capsular release and resection, arthroscopic revision rotator cuff repair of the supraspinatus tendon    INTERVAL HISTORY: Thomas is a 50 year old, right-hand dominant male who returns today now ~5 weeks out from the above operation. Overall, Thomas reports that he is doing well. He notes compared to his previous procedure performed on 5/12/20 his shoulder discomfort is improved and different.  Thomas is currently not using anything for pain control and states that he has been compliant with all post-operative restrictions. Since his last appointment he has completed 6 sessions of Physical Therapy with Physical Therapist Diomedes Sarabia.  Patient freely admits that he is not performing his home stretches and exercises as often as we have been asking him to. No intercurrent trauma. Denies any numbness, tingling, or weakness. No other issues or concerns. He is accompanied by his C today.  Also of note, he has been started on dulaglutide for management of his Type II diabetes mellitus.     EXAM:  Constitutional: Well-developed, well-nourished, healthy appearing male.  Skin: Warm, dry, and without rashes.   Cardiac: Well perfused extremities, strong 2+ peripheral pulses. No edema.   Pulmonary: Non-labored respirations on room air without audible wheezes.   Musculoskeletal: Focused examination of the left shoulder demonstrates a sling abduction pillow that have been removed.  Wounds are healed without redness, warmth, erythema, induration, or signs of infection. Neurovascular exam is normal and intact.   Full passive elbow motion is present.  Supine PROM with forward elevation to 140, abduction to 90, external rotation in abduction to 40, internal rotation in abduction to 40, external rotation at the side to 50.     IMAGING:   No new imaging obtained.  Previous imaging reviewed. Intra-operative arthroscopic images reviewed with the patient.    IMPRESSION: 50 year old-year-old right hand dominant male now 5 weeks status-post the above. Overall doing well.    PLAN:   I had a nice discussion with Thomas today.     I stressed the absolute importance of strict adherence and effort to his stretching exercises at home. He has a significant propensity for post-operative stiffness and capsular contracture that is made worse by his Type II Diabetes. It is absolutely critical that he increases his frequency of stretches and intensity and effort with stretches.     He may begin weaning out of the sling over the course of the next week and may discontinue it fully at the 6-week postoperative jim.     He should continue to work with his primary care doctor to improve his diabetic control.    Return to clinic in 7 weeks for 12-week postoperative jim.  Goal is to have full symmetric motion at that point.  If he is doing well at that time we will progress to strengthening.    At the conclusion of the office visit, Thomas verbally acknowledged that I answered all of his questions satisfactorily.          Again, thank you for allowing me to participate in the care of your patient.        Sincerely,        David Boyd MD

## 2021-02-16 NOTE — PROGRESS NOTES
ORTHOPEDIC SURGERY POST-OPERATIVE VISIT NOTE    DATE OF SURGERY: 1/13/2021    OPERATION PERFORMED:  Left shoulder diagnostic arthroscopy, capsular release and resection, arthroscopic revision rotator cuff repair of the supraspinatus tendon    INTERVAL HISTORY: Thomas is a 50 year old, right-hand dominant male who returns today now ~5 weeks out from the above operation. Overall, Thomas reports that he is doing well. He notes compared to his previous procedure performed on 5/12/20 his shoulder discomfort is improved and different.  Thomas is currently not using anything for pain control and states that he has been compliant with all post-operative restrictions. Since his last appointment he has completed 6 sessions of Physical Therapy with Physical Therapist Diomedes Sarabia.  Patient freely admits that he is not performing his home stretches and exercises as often as we have been asking him to. No intercurrent trauma. Denies any numbness, tingling, or weakness. No other issues or concerns. He is accompanied by his C today.  Also of note, he has been started on dulaglutide for management of his Type II diabetes mellitus.     EXAM:  Constitutional: Well-developed, well-nourished, healthy appearing male.  Skin: Warm, dry, and without rashes.   Cardiac: Well perfused extremities, strong 2+ peripheral pulses. No edema.   Pulmonary: Non-labored respirations on room air without audible wheezes.   Musculoskeletal: Focused examination of the left shoulder demonstrates a sling abduction pillow that have been removed.  Wounds are healed without redness, warmth, erythema, induration, or signs of infection. Neurovascular exam is normal and intact.  Full passive elbow motion is present.  Supine PROM with forward elevation to 140, abduction to 90, external rotation in abduction to 40, internal rotation in abduction to 40, external rotation at the side to 50.     IMAGING:   No new imaging obtained.  Previous imaging  reviewed. Intra-operative arthroscopic images reviewed with the patient.    IMPRESSION: 50 year old-year-old right hand dominant male now 5 weeks status-post the above. Overall doing well.    PLAN:   I had a nice discussion with Thomas today.     I stressed the absolute importance of strict adherence and effort to his stretching exercises at home. He has a significant propensity for post-operative stiffness and capsular contracture that is made worse by his Type II Diabetes. It is absolutely critical that he increases his frequency of stretches and intensity and effort with stretches.     He may begin weaning out of the sling over the course of the next week and may discontinue it fully at the 6-week postoperative jim.     He should continue to work with his primary care doctor to improve his diabetic control.    Return to clinic in 7 weeks for 12-week postoperative jim.  Goal is to have full symmetric motion at that point.  If he is doing well at that time we will progress to strengthening.    At the conclusion of the office visit, Thomas verbally acknowledged that I answered all of his questions satisfactorily.

## 2021-02-16 NOTE — PATIENT INSTRUCTIONS
Continue with Physical Therapy and home exercises.   Work letter provided.     Follow up with Dr. Boyd in 7 weeks.    Call my office with any questions or concerns, [phone].

## 2021-02-19 ENCOUNTER — THERAPY VISIT (OUTPATIENT)
Dept: PHYSICAL THERAPY | Facility: CLINIC | Age: 51
End: 2021-02-19
Payer: OTHER MISCELLANEOUS

## 2021-02-19 DIAGNOSIS — M25.512 SHOULDER PAIN, LEFT: ICD-10-CM

## 2021-02-19 DIAGNOSIS — Z47.89 AFTERCARE FOLLOWING SURGERY OF THE MUSCULOSKELETAL SYSTEM: ICD-10-CM

## 2021-02-19 PROCEDURE — 97112 NEUROMUSCULAR REEDUCATION: CPT | Mod: GP | Performed by: PHYSICAL THERAPIST

## 2021-02-19 PROCEDURE — 97110 THERAPEUTIC EXERCISES: CPT | Mod: GP | Performed by: PHYSICAL THERAPIST

## 2021-02-23 ENCOUNTER — ALLIED HEALTH/NURSE VISIT (OUTPATIENT)
Dept: EDUCATION SERVICES | Facility: CLINIC | Age: 51
End: 2021-02-23
Payer: COMMERCIAL

## 2021-02-23 ENCOUNTER — THERAPY VISIT (OUTPATIENT)
Dept: PHYSICAL THERAPY | Facility: CLINIC | Age: 51
End: 2021-02-23
Payer: OTHER MISCELLANEOUS

## 2021-02-23 DIAGNOSIS — E11.9 TYPE 2 DIABETES MELLITUS WITHOUT COMPLICATION, WITHOUT LONG-TERM CURRENT USE OF INSULIN (H): ICD-10-CM

## 2021-02-23 DIAGNOSIS — M25.512 SHOULDER PAIN, LEFT: ICD-10-CM

## 2021-02-23 DIAGNOSIS — Z47.89 AFTERCARE FOLLOWING SURGERY OF THE MUSCULOSKELETAL SYSTEM: ICD-10-CM

## 2021-02-23 PROCEDURE — 97112 NEUROMUSCULAR REEDUCATION: CPT | Mod: GP | Performed by: PHYSICAL THERAPIST

## 2021-02-23 PROCEDURE — G0108 DIAB MANAGE TRN  PER INDIV: HCPCS

## 2021-02-23 PROCEDURE — 97110 THERAPEUTIC EXERCISES: CPT | Mod: GP | Performed by: PHYSICAL THERAPIST

## 2021-02-23 PROCEDURE — 99207 PR DROP WITH A PROCEDURE: CPT

## 2021-02-23 NOTE — PROGRESS NOTES
Diabetes Self-Management Education & Support    Presents for: Individual review    SUBJECTIVE/OBJECTIVE:  Presents for: Individual review  Accompanied by: Self, Spouse  Diabetes education in the past 24mo: No  Focus of Visit: Other  Diabetes type: Type 2  Disease course: Worsening  Diabetes management related comments/concerns: went to education before, just wnat a refresher  Transportation concerns: No  Difficulty affording diabetes medication?: No  Difficulty affording diabetes testing supplies?: No  Other concerns:: None  Cultural Influences/Ethnic Background:  American    Diabetes Symptoms & Complications:  Fatigue: Sometimes  Neuropathy: No  Polydipsia: Sometimes  Polyphagia: No  Polyuria: Yes  Visual change: No  Slow healing wounds: No  Complications assessed today?: Yes  Autonomic neuropathy: No  CVA: No  Heart disease: No  Nephropathy: No  Peripheral neuropathy: No  Peripheral Vascular Disease: No  Retinopathy: No  Sexual dysfunction: No    Patient Problem List and Family Medical History reviewed for relevant medical history, current medical status, and diabetes risk factors.    Vitals:  There were no vitals taken for this visit.  Estimated body mass index is 37.03 kg/m  as calculated from the following:    Height as of 2/16/21: 1.829 m (6').    Weight as of 2/16/21: 123.8 kg (273 lb).   Last 3 BP:   BP Readings from Last 3 Encounters:   02/16/21 112/74   01/28/21 110/70   01/26/21 109/70       History   Smoking Status     Never Smoker   Smokeless Tobacco     Never Used       Labs:  Lab Results   Component Value Date    A1C 8.6 01/07/2021     Lab Results   Component Value Date     01/28/2021     Lab Results   Component Value Date    LDL 92 01/28/2021     HDL Cholesterol   Date Value Ref Range Status   01/28/2021 46 >39 mg/dL Final   ]  GFR Estimate   Date Value Ref Range Status   01/28/2021 >90 >60 mL/min/[1.73_m2] Final     Comment:     Non  GFR Calc  Starting 12/18/2018, serum  creatinine based estimated GFR (eGFR) will be   calculated using the Chronic Kidney Disease Epidemiology Collaboration   (CKD-EPI) equation.       GFR Estimate If Black   Date Value Ref Range Status   01/28/2021 >90 >60 mL/min/[1.73_m2] Final     Comment:      GFR Calc  Starting 12/18/2018, serum creatinine based estimated GFR (eGFR) will be   calculated using the Chronic Kidney Disease Epidemiology Collaboration   (CKD-EPI) equation.       Lab Results   Component Value Date    CR 0.82 01/28/2021     No results found for: MICROALBUMIN    Healthy Eating:  Healthy Eating Assessed Today: Yes  Cultural/Buddhism diet restrictions?: No  Meal planning/habits: Avoiding sweets, Carb counting  How many times a week on average do you eat food made away from home (restaurant/take-out)?: 1  Meals include: Breakfast, Dinner, Afternoon Snack  Breakfast: yogurt and apple Or egg, englsih muffin Or skip  Lunch: skip or salad Or cottage cheese, apple, celery/veggie with ranch  Dinner: sandwich Or celery, cottage cheese, apple OR ham, asparagus  Snacks: apple, cottage cheese, yogurt, veggie straws  Other: recently changed - previously was eating larger portions, and different foods  Beverages: Water, Diet soda  Has patient met with a dietitian in the past?: No    Being Active:  Being Active Assessed Today: Yes  Exercise:: Currently not exercising  Barrier to exercise: None(sholder surgery in January)    Monitoring:  Monitoring Assessed Today: Yes  Did patient bring glucose meter to appointment? : Yes  Blood Glucose Meter: ContourNext  Times checking blood sugar at home (number): 2  Times checking blood sugar at home (per): Week  Blood glucose trend: Other                  Taking Medications:  Diabetes Medication(s)     Biguanides       metFORMIN (GLUCOPHAGE-XR) 500 MG 24 hr tablet    Take 4 tablets (2,000 mg) by mouth At Bedtime    Incretin Mimetic Agents (GLP-1 Receptor Agonists)       dulaglutide (TRULICITY) 0.75  MG/0.5ML pen    Inject 0.75 mg Subcutaneous every 7 days          Taking Medication Assessed Today: Yes  Current Treatments: Diet, Non-insulin Injectables, Oral Medication (taken by mouth)  Problems taking diabetes medications regularly?: Yes(previously missed once week, now couple time a month)  Diabetes medication side effects?: No    Problem Solving:  Problem Solving Assessed Today: Yes  Is the patient at risk for hypoglycemia?: No    Reducing Risks:  Reducing Risks Assessed Today: Yes  Diabetes Risks: Age over 45 years, Sedentary Lifestyle, Family History, Hyperlipidemia  CAD Risks: Diabetes Mellitus, Hypertension, Obesity, Sedentary lifestyle, Stress, Male sex, Dyslipidemia  Has dilated eye exam at least once a year?: Yes  Sees dentist every 6 months?: No  Feet checked by healthcare provider in the last year?: No    Healthy Coping:  Healthy Coping Assessed Today: Yes  Emotional response to diabetes: Ready to learn  Informal Support system:: Family, Friends, Spouse  Stage of change: PREPARATION (Decided to change - considering how)  Patient Activation Measure Survey Score:  HOWARD Score (Last Two) 10/21/2011   HOWARD Raw Score 38   Activation Score 52.9   HOWARD Level 2       Diabetes knowledge and skills assessment:   Patient is knowledgeable in diabetes management concepts related to: Monitoring    Patient needs further education on the following diabetes management concepts: Healthy Eating, Being Active, Monitoring, Taking Medication, Problem Solving, Reducing Risks and Healthy Coping    Based on learning assessment above, most appropriate setting for further diabetes education would be: Group class or Individual setting.      INTERVENTIONS:    Education provided today on:  AADE Self-Care Behaviors:  Diabetes Pathophysiology  Healthy Eating: consistency in amount, composition, and timing of food intake, weight reduction, portion control and plate planning method  Being Active: relationship to blood glucose, describe  appropriate activity program and precautions to take  Monitoring: individual blood glucose targets and frequency of monitoring  Taking Medication: action of prescribed medication  Problem Solving: high blood glucose - causes, signs/symptoms, treatment and prevention and low blood glucose - causes, signs/symptoms, treatment and prevention  Reducing Risks: major complications of diabetes, prevention, early diagnostic measures and treatment of complications, foot care, appropriate dental care, annual eye exam and A1C - goals, relating to blood glucose levels, how often to check  Healthy Coping: benefits of making appropriate lifestyle changes    Opportunities for ongoing education and support in diabetes-self management were discussed.    Pt verbalized understanding of concepts discussed and recommendations provided today.       Education Materials Provided:  [employer] Healthy Living with Diabetes Book, My Plate Planner and foot care and healthy meal/snack ideas      ASSESSMENT:  Patient would benefit from lifestyle changes, healthy eating, increasing activity andtaking medication consistently.  He started Trulicity about a month ago and appears to be doing well on it. He just picked up he second month prescription at the 0.75mg dose.  Consider increasing to the 1.5mg dose if glucose not in goal with lifestyle changes. He plans to discuss with his PCP.      Patient was not interested in setting any goals today. He states he will follow up as needed.     Patient's most recent   Lab Results   Component Value Date    A1C 8.6 01/07/2021    is not meeting goal of <7.0    PLAN  See Patient Instructions for co-developed, patient-stated behavior change goals.  AVS printed and provided to patient today. See Follow-Up section for recommended follow-up.    Jannet Pérez RN, [facility]    Time Spent: 70 minutes  Encounter Type: Individual    Any diabetes medication dose changes were made via the CDE Protocol and Collaborative Practice  Agreement with the patient's referring provider. A copy of this encounter was shared with the provider.

## 2021-02-23 NOTE — LETTER
2/23/2021         RE: Thomas Pierce  [address] [address]        Dear Colleague,    Thank you for referring your patient, Thomas Pierce, to the [facility]. Please see a copy of my visit note below.    Diabetes Self-Management Education & Support    Presents for: Individual review    SUBJECTIVE/OBJECTIVE:  Presents for: Individual review  Accompanied by: Self, Spouse  Diabetes education in the past 24mo: No  Focus of Visit: Other  Diabetes type: Type 2  Disease course: Worsening  Diabetes management related comments/concerns: went to education before, just wnat a refresher  Transportation concerns: No  Difficulty affording diabetes medication?: No  Difficulty affording diabetes testing supplies?: No  Other concerns:: None  Cultural Influences/Ethnic Background:  American    Diabetes Symptoms & Complications:  Fatigue: Sometimes  Neuropathy: No  Polydipsia: Sometimes  Polyphagia: No  Polyuria: Yes  Visual change: No  Slow healing wounds: No  Complications assessed today?: Yes  Autonomic neuropathy: No  CVA: No  Heart disease: No  Nephropathy: No  Peripheral neuropathy: No  Peripheral Vascular Disease: No  Retinopathy: No  Sexual dysfunction: No    Patient Problem List and Family Medical History reviewed for relevant medical history, current medical status, and diabetes risk factors.    Vitals:  There were no vitals taken for this visit.  Estimated body mass index is 37.03 kg/m  as calculated from the following:    Height as of 2/16/21: 1.829 m (6').    Weight as of 2/16/21: 123.8 kg (273 lb).   Last 3 BP:   BP Readings from Last 3 Encounters:   02/16/21 112/74   01/28/21 110/70   01/26/21 109/70       History   Smoking Status     Never Smoker   Smokeless Tobacco     Never Used       Labs:  Lab Results   Component Value Date    A1C 8.6 01/07/2021     Lab Results   Component Value Date     01/28/2021     Lab Results   Component Value Date    LDL 92  01/28/2021     HDL Cholesterol   Date Value Ref Range Status   01/28/2021 46 >39 mg/dL Final   ]  GFR Estimate   Date Value Ref Range Status   01/28/2021 >90 >60 mL/min/[1.73_m2] Final     Comment:     Non  GFR Calc  Starting 12/18/2018, serum creatinine based estimated GFR (eGFR) will be   calculated using the Chronic Kidney Disease Epidemiology Collaboration   (CKD-EPI) equation.       GFR Estimate If Black   Date Value Ref Range Status   01/28/2021 >90 >60 mL/min/[1.73_m2] Final     Comment:      GFR Calc  Starting 12/18/2018, serum creatinine based estimated GFR (eGFR) will be   calculated using the Chronic Kidney Disease Epidemiology Collaboration   (CKD-EPI) equation.       Lab Results   Component Value Date    CR 0.82 01/28/2021     No results found for: MICROALBUMIN    Healthy Eating:  Healthy Eating Assessed Today: Yes  Cultural/Spiritism diet restrictions?: No  Meal planning/habits: Avoiding sweets, Carb counting  How many times a week on average do you eat food made away from home (restaurant/take-out)?: 1  Meals include: Breakfast, Dinner, Afternoon Snack  Breakfast: yogurt and apple Or egg, englsih muffin Or skip  Lunch: skip or salad Or cottage cheese, apple, celery/veggie with ranch  Dinner: sandwich Or celery, cottage cheese, apple OR ham, asparagus  Snacks: apple, cottage cheese, yogurt, veggie straws  Other: recently changed - previously was eating larger portions, and different foods  Beverages: Water, Diet soda  Has patient met with a dietitian in the past?: No    Being Active:  Being Active Assessed Today: Yes  Exercise:: Currently not exercising  Barrier to exercise: None(sholder surgery in January)    Monitoring:  Monitoring Assessed Today: Yes  Did patient bring glucose meter to appointment? : Yes  Blood Glucose Meter: ContourNext  Times checking blood sugar at home (number): 2  Times checking blood sugar at home (per): Week  Blood glucose trend:  Other                  Taking Medications:  Diabetes Medication(s)     Biguanides       metFORMIN (GLUCOPHAGE-XR) 500 MG 24 hr tablet    Take 4 tablets (2,000 mg) by mouth At Bedtime    Incretin Mimetic Agents (GLP-1 Receptor Agonists)       dulaglutide (TRULICITY) 0.75 MG/0.5ML pen    Inject 0.75 mg Subcutaneous every 7 days          Taking Medication Assessed Today: Yes  Current Treatments: Diet, Non-insulin Injectables, Oral Medication (taken by mouth)  Problems taking diabetes medications regularly?: Yes(previously missed once week, now couple time a month)  Diabetes medication side effects?: No    Problem Solving:  Problem Solving Assessed Today: Yes  Is the patient at risk for hypoglycemia?: No    Reducing Risks:  Reducing Risks Assessed Today: Yes  Diabetes Risks: Age over 45 years, Sedentary Lifestyle, Family History, Hyperlipidemia  CAD Risks: Diabetes Mellitus, Hypertension, Obesity, Sedentary lifestyle, Stress, Male sex, Dyslipidemia  Has dilated eye exam at least once a year?: Yes  Sees dentist every 6 months?: No  Feet checked by healthcare provider in the last year?: No    Healthy Coping:  Healthy Coping Assessed Today: Yes  Emotional response to diabetes: Ready to learn  Informal Support system:: Family, Friends, Spouse  Stage of change: PREPARATION (Decided to change - considering how)  Patient Activation Measure Survey Score:  HOWARD Score (Last Two) 10/21/2011   HOWARD Raw Score 38   Activation Score 52.9   HOWARD Level 2       Diabetes knowledge and skills assessment:   Patient is knowledgeable in diabetes management concepts related to: Monitoring    Patient needs further education on the following diabetes management concepts: Healthy Eating, Being Active, Monitoring, Taking Medication, Problem Solving, Reducing Risks and Healthy Coping    Based on learning assessment above, most appropriate setting for further diabetes education would be: Group class or Individual  setting.      INTERVENTIONS:    Education provided today on:  AADE Self-Care Behaviors:  Diabetes Pathophysiology  Healthy Eating: consistency in amount, composition, and timing of food intake, weight reduction, portion control and plate planning method  Being Active: relationship to blood glucose, describe appropriate activity program and precautions to take  Monitoring: individual blood glucose targets and frequency of monitoring  Taking Medication: action of prescribed medication  Problem Solving: high blood glucose - causes, signs/symptoms, treatment and prevention and low blood glucose - causes, signs/symptoms, treatment and prevention  Reducing Risks: major complications of diabetes, prevention, early diagnostic measures and treatment of complications, foot care, appropriate dental care, annual eye exam and A1C - goals, relating to blood glucose levels, how often to check  Healthy Coping: benefits of making appropriate lifestyle changes    Opportunities for ongoing education and support in diabetes-self management were discussed.    Pt verbalized understanding of concepts discussed and recommendations provided today.       Education Materials Provided:  [employer] Healthy Living with Diabetes Book, My Plate Planner and foot care and healthy meal/snack ideas      ASSESSMENT:  Patient would benefit from lifestyle changes, healthy eating, increasing activity andtaking medication consistently.  He started Trulicity about a month ago and appears to be doing well on it. He just picked up he second month prescription at the 0.75mg dose.  Consider increasing to the 1.5mg dose if glucose not in goal with lifestyle changes. He plans to discuss with his PCP.      Patient was not interested in setting any goals today. He states he will follow up as needed.     Patient's most recent   Lab Results   Component Value Date    A1C 8.6 01/07/2021    is not meeting goal of <7.0    PLAN  See Patient Instructions for co-developed,  patient-stated behavior change goals.  AVS printed and provided to patient today. See Follow-Up section for recommended follow-up.    Jannet Pérez RN, [facility]    Time Spent: 70 minutes  Encounter Type: Individual    Any diabetes medication dose changes were made via the CDE Protocol and Collaborative Practice Agreement with the patient's referring provider. A copy of this encounter was shared with the provider.

## 2021-02-23 NOTE — PATIENT INSTRUCTIONS
Care Plan:  Meal Plan Recommendation: eat 3 meals a day, have small snacks between meals, if needed, use portion control, use plate planning method and refer to the meal and snack ideas  Exercise / activity plan: try adding in 10 minutes a day. Use your exercise bike.  Check blood sugars before meals and 2 hours after the start of meals (breakfast, lunch and supper)  Continue with your medication - consider increase Trulicity if your blood sugars continue to be elevated. Discuss this with your doctor at your follow up appointment.     Follow up:  Follow-up diabetes education appointment as needed.      Bring blood glucose meter and logbook with you to all doctor and follow-up appointments.     [facility] for the [facility]:  For Your Diabetes or Nutrition Education Appointments Call:  [phone]   For Diabetes or Nutrition Related Questions:   [phone]  Send [employer] Message   If you need a medication refill please contact your pharmacy. Please allow 3 business days for your refills to be completed.

## 2021-02-26 ENCOUNTER — THERAPY VISIT (OUTPATIENT)
Dept: PHYSICAL THERAPY | Facility: CLINIC | Age: 51
End: 2021-02-26
Payer: OTHER MISCELLANEOUS

## 2021-02-26 ENCOUNTER — TELEPHONE (OUTPATIENT)
Dept: PHYSICAL THERAPY | Facility: CLINIC | Age: 51
End: 2021-02-26

## 2021-02-26 DIAGNOSIS — M25.512 SHOULDER PAIN, LEFT: ICD-10-CM

## 2021-02-26 DIAGNOSIS — Z47.89 AFTERCARE FOLLOWING SURGERY OF THE MUSCULOSKELETAL SYSTEM: ICD-10-CM

## 2021-02-26 PROCEDURE — 97110 THERAPEUTIC EXERCISES: CPT | Mod: GP | Performed by: PHYSICAL THERAPIST

## 2021-02-26 PROCEDURE — 97112 NEUROMUSCULAR REEDUCATION: CPT | Mod: GP | Performed by: PHYSICAL THERAPIST

## 2021-02-26 NOTE — TELEPHONE ENCOUNTER
Left message for Smita Park, [facility] at [phone] requesting 10 additional PT visits for Nick Pierce at a frequency of 1 x week for 6 weeks tapering to 2 x month for 2 months.  Requested f/u phone call to confirm the approval of requested visits.    Aditya Sarabia, PT

## 2021-02-26 NOTE — PROGRESS NOTES
PROGRESS  REPORT    Progress reporting period is from 2/26/2021.       SUBJECTIVE  Nick is 6 weeks s/p left shoulder revision rotator cuff repair, subacromial decompression, and capsular release.  While initially not being consistent with his HEP of ROM, he has improved his consistency and is pushing with more intensity after  Multiple conversations in PT about the importance of stretching and meeting with Dr. Boyd one week ago who reinforced that importance.  Current Pain level: 2/10.     Initial Pain level: 3/10.   Changes in function:  Yes (See Goal flowsheet attached for changes in current functional level)  Adverse reaction to treatment or activity: None    OBJECTIVE    Supine AAROM:   Flexion = 150   ABD = 140   ER = 72   IR =  36     Strength not assessed    ASSESSMENT/PLAN  Updated problem list and treatment plan: Diagnosis 1:  S/p left shoulder revision rotator cuff repair, subacromial decompression, and capsular release  Pain -  hot/cold therapy, splint/taping/bracing/orthotics, self management, education and home program  Decreased ROM/flexibility - manual therapy and therapeutic exercise  Decreased joint mobility - manual therapy and therapeutic exercise  Decreased strength - therapeutic exercise and therapeutic activities  Impaired muscle performance - neuro re-education  Decreased function - therapeutic activities  Impaired posture - neuro re-education  STG/LTGs have been met or progress has been made towards goals:  Yes (See Goal flow sheet completed today.)  Assessment of Progress: The patient's condition is improving.  Self Management Plans:  Patient has been instructed in a home treatment program.  I have re-evaluated this patient and find that the nature, scope, duration and intensity of the therapy is appropriate for the medical condition of the patient.  Thomas continues to require the following intervention to meet STG and LTG's:  PT    Recommendations:  This patient would benefit from  continued therapy.     Frequency:  1 X week, once daily  Duration:  for 8 weeks tapering to 2 x month for 2 months        Please refer to the daily flowsheet for treatment today, total treatment time and time spent performing 1:1 timed codes.

## 2021-03-01 ENCOUNTER — TRANSFERRED RECORDS (OUTPATIENT)
Dept: HEALTH INFORMATION MANAGEMENT | Facility: CLINIC | Age: 51
End: 2021-03-01

## 2021-03-01 LAB — RETINOPATHY: NORMAL

## 2021-03-04 ENCOUNTER — TELEPHONE (OUTPATIENT)
Dept: PHYSICAL THERAPY | Facility: CLINIC | Age: 51
End: 2021-03-04

## 2021-03-04 DIAGNOSIS — Z47.89 AFTERCARE FOLLOWING SURGERY OF THE MUSCULOSKELETAL SYSTEM: ICD-10-CM

## 2021-03-04 DIAGNOSIS — M25.512 SHOULDER PAIN, LEFT: ICD-10-CM

## 2021-03-04 NOTE — TELEPHONE ENCOUNTER
Called Smita Park at [phone] for follow-up on request for 10 additional visits on 2/26/21.  Reiterated that patient has a f/u scheduled for 3/5/21.  Requested that she call my voicemail at [phone] regarding authorization request.    Aditya Sarabia, PT

## 2021-03-05 ENCOUNTER — THERAPY VISIT (OUTPATIENT)
Dept: PHYSICAL THERAPY | Facility: CLINIC | Age: 51
End: 2021-03-05
Payer: OTHER MISCELLANEOUS

## 2021-03-05 DIAGNOSIS — Z47.89 AFTERCARE FOLLOWING SURGERY OF THE MUSCULOSKELETAL SYSTEM: ICD-10-CM

## 2021-03-05 DIAGNOSIS — M25.512 SHOULDER PAIN, LEFT: ICD-10-CM

## 2021-03-05 PROCEDURE — 97112 NEUROMUSCULAR REEDUCATION: CPT | Mod: GP | Performed by: PHYSICAL THERAPIST

## 2021-03-05 PROCEDURE — 97110 THERAPEUTIC EXERCISES: CPT | Mod: GP | Performed by: PHYSICAL THERAPIST

## 2021-03-12 ENCOUNTER — THERAPY VISIT (OUTPATIENT)
Dept: PHYSICAL THERAPY | Facility: CLINIC | Age: 51
End: 2021-03-12
Payer: OTHER MISCELLANEOUS

## 2021-03-12 DIAGNOSIS — M25.512 SHOULDER PAIN, LEFT: ICD-10-CM

## 2021-03-12 DIAGNOSIS — Z47.89 AFTERCARE FOLLOWING SURGERY OF THE MUSCULOSKELETAL SYSTEM: ICD-10-CM

## 2021-03-12 PROCEDURE — 97110 THERAPEUTIC EXERCISES: CPT | Mod: GP | Performed by: PHYSICAL THERAPIST

## 2021-03-12 PROCEDURE — 97112 NEUROMUSCULAR REEDUCATION: CPT | Mod: GP | Performed by: PHYSICAL THERAPIST

## 2021-03-19 ENCOUNTER — THERAPY VISIT (OUTPATIENT)
Dept: PHYSICAL THERAPY | Facility: CLINIC | Age: 51
End: 2021-03-19
Payer: OTHER MISCELLANEOUS

## 2021-03-19 DIAGNOSIS — Z47.89 AFTERCARE FOLLOWING SURGERY OF THE MUSCULOSKELETAL SYSTEM: ICD-10-CM

## 2021-03-19 DIAGNOSIS — M25.512 SHOULDER PAIN, LEFT: ICD-10-CM

## 2021-03-19 PROCEDURE — 97530 THERAPEUTIC ACTIVITIES: CPT | Mod: GP | Performed by: PHYSICAL THERAPIST

## 2021-03-19 PROCEDURE — 97110 THERAPEUTIC EXERCISES: CPT | Mod: GP | Performed by: PHYSICAL THERAPIST

## 2021-03-23 ENCOUNTER — IMMUNIZATION (OUTPATIENT)
Dept: NURSING | Facility: CLINIC | Age: 51
End: 2021-03-23
Payer: COMMERCIAL

## 2021-03-23 PROCEDURE — 0001A PR COVID VAC PFIZER DIL RECON 30 MCG/0.3 ML IM: CPT

## 2021-03-23 PROCEDURE — 91300 PR COVID VAC PFIZER DIL RECON 30 MCG/0.3 ML IM: CPT

## 2021-03-25 ENCOUNTER — TELEPHONE (OUTPATIENT)
Dept: ORTHOPEDICS | Facility: CLINIC | Age: 51
End: 2021-03-25

## 2021-03-25 NOTE — TELEPHONE ENCOUNTER
Reason for Call:  Form, our goal is to have forms completed with 7 days, however, some forms may require a visit or additional information.    Type of letter, form or note:  FMLA     Who is the form from?: Patient, [employer]    Where did the form come from: Patient or family brought in       Phone number of person requesting form: [phone]  Can we leave a detailed message on this number:  NO    Desired completion date of form: 4/1/21      How will form be returned?:  fax to [phone]    Has the patient signed a consent form for release of information (may be included with form)? NO    Additional comments: Left shoulder diagnostic arthroscopy, capsular release and resection, arthroscopic revision rotator cuff repair of the supraspinatus tendon, DOS: 1/13/21-- Last office visit 2/16/21    Form was started and place in brown accordion folder for provider review/ completion at [facility].

## 2021-03-26 ENCOUNTER — THERAPY VISIT (OUTPATIENT)
Dept: PHYSICAL THERAPY | Facility: CLINIC | Age: 51
End: 2021-03-26
Payer: OTHER MISCELLANEOUS

## 2021-03-26 DIAGNOSIS — M25.512 SHOULDER PAIN, LEFT: ICD-10-CM

## 2021-03-26 DIAGNOSIS — Z47.89 AFTERCARE FOLLOWING SURGERY OF THE MUSCULOSKELETAL SYSTEM: ICD-10-CM

## 2021-03-26 PROCEDURE — 97112 NEUROMUSCULAR REEDUCATION: CPT | Mod: GP | Performed by: PHYSICAL THERAPIST

## 2021-03-26 PROCEDURE — 97110 THERAPEUTIC EXERCISES: CPT | Mod: GP | Performed by: PHYSICAL THERAPIST

## 2021-04-02 ENCOUNTER — THERAPY VISIT (OUTPATIENT)
Dept: PHYSICAL THERAPY | Facility: CLINIC | Age: 51
End: 2021-04-02
Payer: OTHER MISCELLANEOUS

## 2021-04-02 DIAGNOSIS — Z47.89 AFTERCARE FOLLOWING SURGERY OF THE MUSCULOSKELETAL SYSTEM: ICD-10-CM

## 2021-04-02 DIAGNOSIS — M25.512 SHOULDER PAIN, LEFT: ICD-10-CM

## 2021-04-02 PROCEDURE — 97112 NEUROMUSCULAR REEDUCATION: CPT | Mod: GP | Performed by: PHYSICAL THERAPIST

## 2021-04-02 PROCEDURE — 97110 THERAPEUTIC EXERCISES: CPT | Mod: GP | Performed by: PHYSICAL THERAPIST

## 2021-04-02 NOTE — PROGRESS NOTES
PROGRESS  REPORT    Progress report is from 4/2/2021.       SUBJECTIVE  Nick is 11.5 weeks s/p left rotator cuff revision.  Over the last 5 weeks he made significant improvements in his range of motion.  He reports that he has been doing his stretches about 3 times daily.  Still does not have much for strength in the arm which limits his function.  Current Pain level: 1/10.     Initial Pain level: 3/10.   Changes in function:  Yes (See Goal flowsheet attached for changes in current functional level)  Adverse reaction to treatment or activity: None    OBJECTIVE  Supine ROM:   Left Shoulder (Surgical)    Flexion = 170  (180 with OP)    ER = 85 (97 with OP)     IR = 43   Right Shoulder (non-surgical)    Flexion = 180    ER = 95    IR = 47    AROM:   Standing elevation = 55 degrees with substitution    Strength:   Scaption = 2-/5   ER = 3+/5   IR = 4/5     ASSESSMENT/PLAN  Updated problem list and treatment plan: Diagnosis 1:  S/p Left Shoulder Revision Rotator Cuff Repair     Pain -  hot/cold therapy, self management, education and home program  Decreased ROM/flexibility - manual therapy, therapeutic exercise, therapeutic activity and home program  Decreased strength - therapeutic exercise, therapeutic activities and home program  Impaired muscle performance - neuro re-education and home program  Decreased function - therapeutic activities and home program  Impaired posture - neuro re-education, therapeutic activities and home program  STG/LTGs have been met or progress has been made towards goals:  Yes (See Goal flow sheet completed today.)  Assessment of Progress: The patient's condition is improving.  Self Management Plans:  Patient has been instructed in a home treatment program.  I have re-evaluated this patient and find that the nature, scope, duration and intensity of the therapy is appropriate for the medical condition of the patient.  Thomas continues to require the following intervention to meet STG and  LTG's:  PT    Recommendations:  This patient would benefit from continued therapy.     Frequency:  2 X a month, once daily  Duration:  for 3 months        Please refer to the daily flowsheet for treatment today, total treatment time and time spent performing 1:1 timed codes.

## 2021-04-06 ENCOUNTER — OFFICE VISIT (OUTPATIENT)
Dept: ORTHOPEDICS | Facility: CLINIC | Age: 51
End: 2021-04-06
Payer: OTHER MISCELLANEOUS

## 2021-04-06 VITALS
DIASTOLIC BLOOD PRESSURE: 83 MMHG | HEIGHT: 72 IN | SYSTOLIC BLOOD PRESSURE: 130 MMHG | WEIGHT: 273 LBS | BODY MASS INDEX: 36.98 KG/M2

## 2021-04-06 DIAGNOSIS — Z98.890 S/P ARTHROSCOPY OF LEFT SHOULDER: Primary | ICD-10-CM

## 2021-04-06 PROCEDURE — 99024 POSTOP FOLLOW-UP VISIT: CPT | Performed by: ORTHOPAEDIC SURGERY

## 2021-04-06 ASSESSMENT — MIFFLIN-ST. JEOR: SCORE: 2121.32

## 2021-04-06 NOTE — PROGRESS NOTES
ORTHOPEDIC SURGERY POST-OPERATIVE VISIT NOTE    DATE OF SURGERY: 1/13/2021    OPERATION PERFORMED:  Left shoulder diagnostic arthroscopy, capsular release and resection, arthroscopic revision rotator cuff repair of the supraspinatus tendon    INTERVAL HISTORY: Thomas is a 50 year old, right-hand dominant male who returns today now 3 months out from the above operation. Overall, Thomas reports that he is doing well. Has no concerns or complaints.  Nick notes that he feels so much better this time around compared to his last time.  He notes that the pain that he had previously in his anterior shoulder is gone.  He feels that his flexibility has improved dramatically.  He still notes continued weakness in scaption and forward elevation.  He is wondering about return to work restrictions at this point.    EXAM:  Constitutional: Well-developed, well-nourished, healthy appearing male.  Skin: Warm, dry, and without rashes.   Cardiac: Well perfused extremities, strong 2+ peripheral pulses. No edema.   Pulmonary: Non-labored respirations on room air without audible wheezes.   Musculoskeletal: Focused examination of the left shoulder demonstrates seated active forward elevation to 80 degrees, seated active external rotation to 60 degrees, internal rotation of the back to L2.  Supine active forward elevation 170, external rotation in abduction to 85, internal rotation and abduction to 50 degrees.  Normal belly press, and strong external rotation at the side with persistent weakness in scaption and forward elevation.  Neurovascular exam is intact.    IMAGING:   No new imaging obtained.  Previous imaging reviewed.    IMPRESSION: 50 year old-year-old right hand dominant male now 3 months status-post the above. Overall doing well.    PLAN:   I had a nice discussion with Thomas today.     I am very pleased with the progress he has made with his range of motion.  At this time, I would like him to progress with his  strengthening and emphasized the importance of his supine strengthening exercises especially with forward elevation and scaption for rotator cuff reeducation.  At this time, it is okay to advance his lifting restriction of 5 pounds, no lifting overhead, and it is okay for him to drive.  An updated work note was provided.  He will return to clinic to see me in 6 weeks for repeat clinical evaluation, no x-rays needed.    At the conclusion of the office visit, Thomas verbally acknowledged that I answered all of his questions satisfactorily.

## 2021-04-06 NOTE — TELEPHONE ENCOUNTER
Forms completed and faxed. Copy made and sent to scan.    Original forms handed to patient at today's appointment.    Donna Wong ATC

## 2021-04-06 NOTE — PATIENT INSTRUCTIONS
1. S/P arthroscopy of left shoulder        Work letter provided and forms filled out and given     Follow up with Dr. Boyd in 6 weeks    Call my office with any questions or concerns, [phone].

## 2021-04-06 NOTE — LETTER
4/6/2021         RE: Thomas Pierce  [address] [address]        Dear Colleague,    Thank you for referring your patient, Thomas Pierce, to the [facility] [place]. Please see a copy of my visit note below.    ORTHOPEDIC SURGERY POST-OPERATIVE VISIT NOTE    DATE OF SURGERY: 1/13/2021    OPERATION PERFORMED:  Left shoulder diagnostic arthroscopy, capsular release and resection, arthroscopic revision rotator cuff repair of the supraspinatus tendon    INTERVAL HISTORY: Thomas is a 50 year old, right-hand dominant male who returns today now 3 months out from the above operation. Overall, Thomas reports that he is doing well. Has no concerns or complaints.  Nick notes that he feels so much better this time around compared to his last time.  He notes that the pain that he had previously in his anterior shoulder is gone.  He feels that his flexibility has improved dramatically.  He still notes continued weakness in scaption and forward elevation.  He is wondering about return to work restrictions at this point.    EXAM:  Constitutional: Well-developed, well-nourished, healthy appearing male.  Skin: Warm, dry, and without rashes.   Cardiac: Well perfused extremities, strong 2+ peripheral pulses. No edema.   Pulmonary: Non-labored respirations on room air without audible wheezes.   Musculoskeletal: Focused examination of the left shoulder demonstrates seated active forward elevation to 80 degrees, seated active external rotation to 60 degrees, internal rotation of the back to L2.  Supine active forward elevation 170, external rotation in abduction to 85, internal rotation and abduction to 50 degrees.  Normal belly press, and strong external rotation at the side with persistent weakness in scaption and forward elevation.  Neurovascular exam is intact.    IMAGING:   No new imaging obtained.  Previous imaging reviewed.    IMPRESSION: 50 year old-year-old  right hand dominant male now 3 months status-post the above. Overall doing well.    PLAN:   I had a nice discussion with Thomas today.     I am very pleased with the progress he has made with his range of motion.  At this time, I would like him to progress with his strengthening and emphasized the importance of his supine strengthening exercises especially with forward elevation and scaption for rotator cuff reeducation.  At this time, it is okay to advance his lifting restriction of 5 pounds, no lifting overhead, and it is okay for him to drive.  An updated work note was provided.  He will return to clinic to see me in 6 weeks for repeat clinical evaluation, no x-rays needed.    At the conclusion of the office visit, Ebjoannechristina verbally acknowledged that I answered all of his questions satisfactorily.      Again, thank you for allowing me to participate in the care of your patient.        Sincerely,        David Boyd MD

## 2021-04-06 NOTE — LETTER
April 6, 2021      Thomas Pierce  [address] [address]        To Whom It May Concern:    Thomas Pierce was seen in our clinic. He may return to work with the following: limited to light duty - lifting, pushing or pulling no greater than 5 pounds with left arm. No overhead activities with the left arm and ok to start driving. He is to follow up in 6 weeks for updated work restrictions.      Sincerely,          David Boyd MD

## 2021-04-08 ENCOUNTER — MYC MEDICAL ADVICE (OUTPATIENT)
Dept: ORTHOPEDICS | Facility: CLINIC | Age: 51
End: 2021-04-08

## 2021-04-09 ENCOUNTER — THERAPY VISIT (OUTPATIENT)
Dept: PHYSICAL THERAPY | Facility: CLINIC | Age: 51
End: 2021-04-09
Payer: OTHER MISCELLANEOUS

## 2021-04-09 ENCOUNTER — MYC MEDICAL ADVICE (OUTPATIENT)
Dept: PODIATRY | Facility: CLINIC | Age: 51
End: 2021-04-09

## 2021-04-09 DIAGNOSIS — Z47.89 AFTERCARE FOLLOWING SURGERY OF THE MUSCULOSKELETAL SYSTEM: ICD-10-CM

## 2021-04-09 DIAGNOSIS — M25.512 SHOULDER PAIN, LEFT: ICD-10-CM

## 2021-04-09 PROCEDURE — 97110 THERAPEUTIC EXERCISES: CPT | Mod: GP | Performed by: PHYSICAL THERAPIST

## 2021-04-09 PROCEDURE — 97112 NEUROMUSCULAR REEDUCATION: CPT | Mod: GP | Performed by: PHYSICAL THERAPIST

## 2021-04-13 ENCOUNTER — IMMUNIZATION (OUTPATIENT)
Dept: NURSING | Facility: CLINIC | Age: 51
End: 2021-04-13
Payer: COMMERCIAL

## 2021-04-13 PROCEDURE — 91300 PR COVID VAC PFIZER DIL RECON 30 MCG/0.3 ML IM: CPT

## 2021-04-13 PROCEDURE — 0002A PR COVID VAC PFIZER DIL RECON 30 MCG/0.3 ML IM: CPT

## 2021-04-13 NOTE — TELEPHONE ENCOUNTER
"Someone from the [employer] called about the patients [place] paperwork. They are requesting \"Part C\", the reduced schedule portion, to be filled out and returned. Or call [phone] to confirm reduced schedule status.  "

## 2021-04-23 ENCOUNTER — THERAPY VISIT (OUTPATIENT)
Dept: PHYSICAL THERAPY | Facility: CLINIC | Age: 51
End: 2021-04-23
Payer: OTHER MISCELLANEOUS

## 2021-04-23 DIAGNOSIS — Z47.89 AFTERCARE FOLLOWING SURGERY OF THE MUSCULOSKELETAL SYSTEM: ICD-10-CM

## 2021-04-23 DIAGNOSIS — M25.512 SHOULDER PAIN, LEFT: ICD-10-CM

## 2021-04-23 PROCEDURE — 97110 THERAPEUTIC EXERCISES: CPT | Mod: GP | Performed by: PHYSICAL THERAPIST

## 2021-04-23 PROCEDURE — 97112 NEUROMUSCULAR REEDUCATION: CPT | Mod: GP | Performed by: PHYSICAL THERAPIST

## 2021-05-05 NOTE — PROGRESS NOTES
"    {PROVIDER CHARTING PREFERENCE:794788}    Subjective   Nick is a 51 year old who presents for the following health issues {ACCOMPANIED BY STATEMENT (Optional):894647}    HPI     Diabetes Follow-up      How often are you checking your blood sugar? { :042165}    What concerns do you have today about your diabetes? { :758297::\"None\"}     Do you have any of these symptoms? (Select all that apply)  { :711928}    Have you had a diabetic eye exam in the last 12 months? { :376268}        BP Readings from Last 2 Encounters:   04/06/21 130/83   02/16/21 112/74     Hemoglobin A1C (%)   Date Value   01/07/2021 8.6 (H)   05/07/2020 8.3 (H)     LDL Cholesterol Calculated (mg/dL)   Date Value   01/28/2021 92   05/07/2020 110 (H)       {Reference  Diabetes Management Resources :099276}    {Reference  Diabetes Log - 7 days :023443}    Hyperlipidemia Follow-Up      Are you regularly taking any medication or supplement to lower your cholesterol?   { :028223}    Are you having muscle aches or other side effects that you think could be caused by your cholesterol lowering medication?  { :794415}    Hypertension Follow-up      Do you check your blood pressure regularly outside of the clinic? { :052826}     Are you following a low salt diet? { :803955}    Are your blood pressures ever more than 140 on the top number (systolic) OR more   than 90 on the bottom number (diastolic), for example 140/90? { :816672}      How many servings of fruits and vegetables do you eat daily?  { :038957}    On average, how many sweetened beverages do you drink each day (Examples: soda, juice, sweet tea, etc.  Do NOT count diet or artificially sweetened beverages)?   { 1-11:241007}    How many days per week do you exercise enough to make your heart beat faster? { :444888}    How many minutes a day do you exercise enough to make your heart beat faster? { :861619}    How many days per week do you miss taking your medication? {0-7 :846440}    {additonal " problems for provider to add (Optional):123468}    Review of Systems   {ROS COMP (Optional):854256}      Objective    There were no vitals taken for this visit.  There is no height or weight on file to calculate BMI.  Physical Exam   {Exam List (Optional):026798}    {Diagnostic Test Results (Optional):392039}    {AMBULATORY ATTESTATION (Optional):636930}         dosing/58.4Kg (1/9)58.

## 2021-05-06 ENCOUNTER — OFFICE VISIT (OUTPATIENT)
Dept: FAMILY MEDICINE | Facility: CLINIC | Age: 51
End: 2021-05-06
Payer: COMMERCIAL

## 2021-05-06 VITALS
BODY MASS INDEX: 34 KG/M2 | OXYGEN SATURATION: 97 % | DIASTOLIC BLOOD PRESSURE: 78 MMHG | HEIGHT: 72 IN | RESPIRATION RATE: 20 BRPM | WEIGHT: 251 LBS | SYSTOLIC BLOOD PRESSURE: 110 MMHG | TEMPERATURE: 97 F | HEART RATE: 78 BPM

## 2021-05-06 DIAGNOSIS — Z23 NEED FOR SHINGLES VACCINE: ICD-10-CM

## 2021-05-06 DIAGNOSIS — E78.5 HYPERLIPIDEMIA LDL GOAL <100: ICD-10-CM

## 2021-05-06 DIAGNOSIS — I10 HYPERTENSION GOAL BP (BLOOD PRESSURE) < 140/90: ICD-10-CM

## 2021-05-06 DIAGNOSIS — E11.9 TYPE 2 DIABETES MELLITUS WITHOUT COMPLICATION, WITHOUT LONG-TERM CURRENT USE OF INSULIN (H): Primary | ICD-10-CM

## 2021-05-06 DIAGNOSIS — Z12.11 SCREEN FOR COLON CANCER: ICD-10-CM

## 2021-05-06 DIAGNOSIS — R79.82 ELEVATED C-REACTIVE PROTEIN (CRP): ICD-10-CM

## 2021-05-06 PROBLEM — E11.65 UNCONTROLLED TYPE 2 DIABETES MELLITUS WITH HYPERGLYCEMIA (H): Status: RESOLVED | Noted: 2020-05-07 | Resolved: 2021-05-06

## 2021-05-06 PROBLEM — M25.512 SHOULDER PAIN, LEFT: Status: RESOLVED | Noted: 2020-03-04 | Resolved: 2021-05-06

## 2021-05-06 PROBLEM — E66.01 SEVERE OBESITY (BMI 35.0-39.9) WITH COMORBIDITY (H): Status: RESOLVED | Noted: 2017-10-30 | Resolved: 2021-05-06

## 2021-05-06 LAB
ALBUMIN SERPL-MCNC: 3.8 G/DL (ref 3.4–5)
ALP SERPL-CCNC: 61 U/L (ref 40–150)
ALT SERPL W P-5'-P-CCNC: 37 U/L (ref 0–70)
ANION GAP SERPL CALCULATED.3IONS-SCNC: 1 MMOL/L (ref 3–14)
AST SERPL W P-5'-P-CCNC: 19 U/L (ref 0–45)
BILIRUB SERPL-MCNC: 0.4 MG/DL (ref 0.2–1.3)
BUN SERPL-MCNC: 11 MG/DL (ref 7–30)
CALCIUM SERPL-MCNC: 9.4 MG/DL (ref 8.5–10.1)
CHLORIDE SERPL-SCNC: 110 MMOL/L (ref 94–109)
CO2 SERPL-SCNC: 29 MMOL/L (ref 20–32)
CREAT SERPL-MCNC: 0.91 MG/DL (ref 0.66–1.25)
CRP SERPL-MCNC: <2.9 MG/L (ref 0–8)
GFR SERPL CREATININE-BSD FRML MDRD: >90 ML/MIN/{1.73_M2}
GLUCOSE SERPL-MCNC: 117 MG/DL (ref 70–99)
HBA1C MFR BLD: 6 % (ref 0–5.6)
POTASSIUM SERPL-SCNC: 4.5 MMOL/L (ref 3.4–5.3)
PROT SERPL-MCNC: 7.2 G/DL (ref 6.8–8.8)
SODIUM SERPL-SCNC: 140 MMOL/L (ref 133–144)

## 2021-05-06 PROCEDURE — 86140 C-REACTIVE PROTEIN: CPT | Performed by: PHYSICIAN ASSISTANT

## 2021-05-06 PROCEDURE — 83036 HEMOGLOBIN GLYCOSYLATED A1C: CPT | Performed by: PHYSICIAN ASSISTANT

## 2021-05-06 PROCEDURE — 90750 HZV VACC RECOMBINANT IM: CPT | Performed by: PHYSICIAN ASSISTANT

## 2021-05-06 PROCEDURE — 90471 IMMUNIZATION ADMIN: CPT | Performed by: PHYSICIAN ASSISTANT

## 2021-05-06 PROCEDURE — 80053 COMPREHEN METABOLIC PANEL: CPT | Performed by: PHYSICIAN ASSISTANT

## 2021-05-06 PROCEDURE — 99207 PR FOOT EXAM NO CHARGE: CPT | Mod: 25 | Performed by: PHYSICIAN ASSISTANT

## 2021-05-06 PROCEDURE — 36415 COLL VENOUS BLD VENIPUNCTURE: CPT | Performed by: PHYSICIAN ASSISTANT

## 2021-05-06 PROCEDURE — 99214 OFFICE O/P EST MOD 30 MIN: CPT | Mod: 25 | Performed by: PHYSICIAN ASSISTANT

## 2021-05-06 RX ORDER — CHLORTHALIDONE 25 MG/1
25 TABLET ORAL DAILY
Qty: 90 TABLET | Refills: 1 | Status: SHIPPED | OUTPATIENT
Start: 2021-05-06 | End: 2022-04-27

## 2021-05-06 RX ORDER — DULAGLUTIDE 0.75 MG/.5ML
0.75 INJECTION, SOLUTION SUBCUTANEOUS
Qty: 6 ML | Refills: 1 | Status: SHIPPED | OUTPATIENT
Start: 2021-05-06 | End: 2022-04-27

## 2021-05-06 RX ORDER — METFORMIN HCL 500 MG
2000 TABLET, EXTENDED RELEASE 24 HR ORAL AT BEDTIME
Qty: 360 TABLET | Refills: 1 | Status: SHIPPED | OUTPATIENT
Start: 2021-05-06 | End: 2022-04-27

## 2021-05-06 RX ORDER — LISINOPRIL 40 MG/1
40 TABLET ORAL DAILY
Qty: 90 TABLET | Refills: 1 | Status: SHIPPED | OUTPATIENT
Start: 2021-05-06 | End: 2022-04-27

## 2021-05-06 ASSESSMENT — MIFFLIN-ST. JEOR: SCORE: 2021.53

## 2021-05-06 NOTE — PROGRESS NOTES
Assessment & Plan     Type 2 diabetes mellitus without complication, without long-term current use of insulin (H)  Markedly improved.  Continue current regimen.  Applauded diet/exercise changes.  - Hemoglobin A1c  - Comprehensive metabolic panel (BMP + Alb, Alk Phos, ALT, AST, Total. Bili, TP)  - metFORMIN (GLUCOPHAGE-XR) 500 MG 24 hr tablet  Dispense: 360 tablet; Refill: 1  - dulaglutide (TRULICITY) 0.75 MG/0.5ML pen  Dispense: 6 mL; Refill: 1  - MO FOOT EXAM NO CHARGE    Elevated C-reactive protein (CRP)  Likely from recent surgery.  Repeat to ensure normalization.  - CRP, inflammation    Need for shingles vaccine  Vaccinated today.  - ZOSTER VACCINE RECOMBINANT ADJUVANTED (SHINGRIX)    Hypertension goal BP (blood pressure) < 140/90  Normotensive.  Continue current regimen.  - lisinopril (ZESTRIL) 40 MG tablet  Dispense: 90 tablet; Refill: 1  - chlorthalidone (HYGROTON) 25 MG tablet  Dispense: 90 tablet; Refill: 1    Screen for colon cancer  Routine screening.  Strongly encouraged colonoscopy.  - GASTROENTEROLOGY ADULT REF PROCEDURE ONLY  - Fecal colorectal cancer screen (FIT)      No follow-ups on file.    Jasmyn Giles PA-C  [facility] BETH Romero is a 51 year old who presents for the following health issues     History of Present Illness       Diabetes:   He presents for follow up of diabetes.  He is checking home blood glucose a few times a week. He checks blood glucose before and after meals.  Blood glucose is never over 200 and never under 70. When his blood glucose is low, the patient is asymptomatic for confusion, blurred vision, lethargy and reports not feeling dizzy, shaky, or weak.  He has no concerns regarding his diabetes at this time.  He is not experiencing numbness or burning in feet, excessive thirst, blurry vision, weight changes or redness, sores or blisters on feet. The patient has had a diabetic eye exam in the last 12 months. Eye exam performed on  within the last 3 months. Location of last eye exam [facility].      Diabetes comment:  Patient saw diabetic education and has made some significant diet changes.  Has lost 20 lbs.  Not currently exercising.  Metformin 1000 mg BID and ADDED trulicity 0.75 mg weekly 1/28/2021 - doing well with this RX.  Wondering about increasing dose as the diabetic educator mentioned it may be needed if not at goal.    Hyperlipidemia:  He presents for follow up of hyperlipidemia.  He is taking medication to lower cholesterol. He is not having myalgia or other side effects to statin medications.  Hyperlipidemia comment:  Rosuvastatin 20 mg daily    Hypertension: He presents for follow up of hypertension.  He does not check blood pressure  regularly outside of the clinic. Outpatient blood pressures have not been over 140/90. He follows a low salt diet.   Hypertension comment:  Lisinopril 40 mg and chlorthalidone 25 mg daily.    He eats 2-3 servings of fruits and vegetables daily.He consumes 0 sweetened beverage(s) daily.He exercises with enough effort to increase his heart rate 9 or less minutes per day.  He exercises with enough effort to increase his heart rate 3 or less days per week.   He is taking medications regularly.        CRP   Elevated in January - suspected due to recent shoulder surgery.  No concerns for infection.  Denies F/C/NS.      Review of Systems   Constitutional, HEENT, cardiovascular, pulmonary, GI, , musculoskeletal, neuro, skin, endocrine and psych systems are negative, except as otherwise noted.      Objective    /78   Pulse 78   Temp 97  F (36.1  C)   Resp 20   Ht 1.829 m (6')   Wt 113.9 kg (251 lb)   SpO2 97%   BMI 34.04 kg/m    Body mass index is 34.04 kg/m .  Physical Exam   GENERAL: healthy, alert and no distress  EYES: Eyes grossly normal to inspection  RESP: lungs clear to auscultation - no rales, rhonchi or wheezes  CV: regular rate and rhythm, normal S1 S2, no S3 or  S4, no murmur, click or rub, no peripheral edema and peripheral pulses strong  MS: no gross musculoskeletal defects noted, no edema  SKIN: no suspicious lesions or rashes  NEURO: Normal strength and tone, mentation intact and speech normal  PSYCH: mentation appears normal, affect normal/bright  Diabetic foot exam: normal DP and PT pulses, no trophic changes or ulcerative lesions and normal sensory exam    Results for orders placed or performed in visit on 05/06/21 (from the past 24 hour(s))   Hemoglobin A1c   Result Value Ref Range    Hemoglobin A1C 6.0 (H) 0 - 5.6 %             Answers for HPI/ROS submitted by the patient on 5/6/2021   Chronic problems general questions HPI Form  Dietary sodium intake:: Yes

## 2021-05-06 NOTE — RESULT ENCOUNTER NOTE
Nick  Here are your recent results.      Your CRP (inflammatory marker) that was elevated has normalized (likely just postoperative inflammation.  -Liver and gallbladder tests are normal (ALT,AST, Alk phos, bilirubin), kidney function is normal (Cr, GFR), sodium is normal, potassium is normal, calcium is normal, glucose is elevated from your diabetes.     If you have any questions please do not hesitate to contact our office via phone ([phone]) or MyChart.    Jasmyn Giles MS, PA-C  [facility]

## 2021-05-06 NOTE — RESULT ENCOUNTER NOTE
Nick  Here are your recent results.  Your diabetes control has DRAMATICALLY improved with the new medication and your weight loss.  No dosage adjustments are recommended.  Keep up the great work with your diet and try to incorporate exercise as well.  Plan to follow up in 6 months for your fasting annual physical/repeat labs.     If you have any questions please do not hesitate to contact our office via phone ([phone]) or MyChart.    Jasmyn Giles MS, PA-C  [facility]

## 2021-05-07 ENCOUNTER — THERAPY VISIT (OUTPATIENT)
Dept: PHYSICAL THERAPY | Facility: CLINIC | Age: 51
End: 2021-05-07
Payer: OTHER MISCELLANEOUS

## 2021-05-07 DIAGNOSIS — Z47.89 AFTERCARE FOLLOWING SURGERY OF THE MUSCULOSKELETAL SYSTEM: ICD-10-CM

## 2021-05-07 PROCEDURE — 97112 NEUROMUSCULAR REEDUCATION: CPT | Mod: GP | Performed by: PHYSICAL THERAPIST

## 2021-05-07 PROCEDURE — 97110 THERAPEUTIC EXERCISES: CPT | Mod: GP | Performed by: PHYSICAL THERAPIST

## 2021-05-07 NOTE — PROGRESS NOTES
PROGRESS  REPORT    Progress reporting period is from 5/7/2021.       SUBJECTIVE  Nick is 4 months s/p revision rotator cuff repair.  Overall has progressed well since his revision surgery. Range of motion is progressing well.  Continues to have difficulty with supraspinatus strength and substitution with elevation but showing signs of improving.  Current Pain level: 1/10.     Initial Pain level: 3/10.   Changes in function:  Yes (See Goal flowsheet attached for changes in current functional level)  Adverse reaction to treatment or activity: None    OBJECTIVE    Supine PROM:     Flexion = 170   ER = 90   IR = 50     ASSESSMENT/PLAN  Updated problem list and treatment plan: Diagnosis 1:  S/p Right Rotator Cuff Revision  Pain -  splint/taping/bracing/orthotics, self management, education and home program  Decreased strength - therapeutic exercise, therapeutic activities and home program  Impaired muscle performance - neuro re-education and home program  Decreased function - therapeutic activities  STG/LTGs have been met or progress has been made towards goals:  Yes (See Goal flow sheet completed today.)  Assessment of Progress: The patient's condition is improving.  Self Management Plans:  Patient has been instructed in a home treatment program.  I have re-evaluated this patient and find that the nature, scope, duration and intensity of the therapy is appropriate for the medical condition of the patient.  Thomas continues to require the following intervention to meet STG and LTG's:  PT    Recommendations:  This patient would benefit from continued therapy.     Frequency:  2 X month once daily  Duration:  for 2 months        Please refer to the daily flowsheet for treatment today, total treatment time and time spent performing 1:1 timed codes.

## 2021-05-18 ENCOUNTER — OFFICE VISIT (OUTPATIENT)
Dept: ORTHOPEDICS | Facility: CLINIC | Age: 51
End: 2021-05-18
Payer: OTHER MISCELLANEOUS

## 2021-05-18 VITALS
HEIGHT: 72 IN | WEIGHT: 250 LBS | SYSTOLIC BLOOD PRESSURE: 114 MMHG | BODY MASS INDEX: 33.86 KG/M2 | DIASTOLIC BLOOD PRESSURE: 76 MMHG

## 2021-05-18 DIAGNOSIS — Z98.890 S/P ARTHROSCOPY OF LEFT SHOULDER: Primary | ICD-10-CM

## 2021-05-18 PROCEDURE — 99213 OFFICE O/P EST LOW 20 MIN: CPT | Performed by: ORTHOPAEDIC SURGERY

## 2021-05-18 ASSESSMENT — MIFFLIN-ST. JEOR: SCORE: 2016.99

## 2021-05-18 NOTE — LETTER
May 18, 2021      Thomas Pierce  [address] [address]        To Whom It May Concern:    Thomas Pierce was seen in our clinic. He may return to work with the following: limited to light duty - lifting, pushing or pulling no greater than 20 pounds with left arm away from the body. No lifting greater than 5 pounds overhead.  He is to follow up in 6 weeks for updated work restrictions.      Sincerely,              David Boyd MD

## 2021-05-18 NOTE — PROGRESS NOTES
ORTHOPEDIC SURGERY POST-OPERATIVE VISIT NOTE    DATE OF SURGERY: 1/13/2021    OPERATION PERFORMED:  Left shoulder diagnostic arthroscopy, capsular release and resection, arthroscopic revision rotator cuff repair of the supraspinatus tendon    INTERVAL HISTORY: Thomas is a 51 year old, right-hand dominant male who returns today now 4 months out from the above operation. Overall, Thomas reports that he is doing well. Has no concerns or complaints.  He reports his range of motion has seemed to peaked and he has not made any further progress. He is aware that strengthening the shoulder will take time. Overall he is pleased with his progress and lack of pain in the left shoulder.     EXAM:  Constitutional: Well-developed, well-nourished, healthy appearing male.  Skin: Warm, dry, and without rashes.   Cardiac: Well perfused extremities, strong 2+ peripheral pulses. No edema.   Pulmonary: Non-labored respirations on room air without audible wheezes.   Musculoskeletal: Focused examination of the left shoulder demonstrates seated active forward elevation to 160 degrees, seated external rotation in abduction to 70 degrees, seated active external rotation at the side to 60 degrees, internal rotation of the back to T10.  Supine active forward elevation 170, external rotation in abduction to 85, internal rotation in abduction to 50 degrees.  Normal belly press, and strong external rotation at the side with improving strength in scaption and forward elevation to 4 out of 5.  Neurovascular exam is intact.    IMAGING:   No new imaging obtained.  Previous imaging reviewed.    IMPRESSION: 51 year old-year-old right hand dominant male now 4.5 months status-post the above. Overall doing well.    PLAN:   I had a nice discussion with Thomas today.     I am very pleased with the progress he has made with his range of motion and his improving strength.  I would like him to continue to progress with strengthening and  endurance training exercises with Diomedes Sarabia.  He should continue physical therapy for at least another 6 weeks.    Work restrictions have been advanced to no lifting greater than 20 pounds at the side, no lifting greater than 5 pounds overhead, and it is okay for him to continue driving.  Updated work note provided.    Return to clinic in 6 weeks with repeat left shoulder 4 view x-rays, repeat clinical evaluation, and we will likely send him for work hardening and functional capacity evaluation at that visit.     At the conclusion of the office visit, Thomas verbally acknowledged that I answered all of his questions satisfactorily.

## 2021-05-18 NOTE — LETTER
5/18/2021         RE: Thomas Pierce  [address] [facility] [address]        Dear Colleague,    Thank you for referring your patient, Thomas Pierce, to the [facility] [place]. Please see a copy of my visit note below.    ORTHOPEDIC SURGERY POST-OPERATIVE VISIT NOTE    DATE OF SURGERY: 1/13/2021    OPERATION PERFORMED:  Left shoulder diagnostic arthroscopy, capsular release and resection, arthroscopic revision rotator cuff repair of the supraspinatus tendon    INTERVAL HISTORY: Thomas is a 51 year old, right-hand dominant male who returns today now 4 months out from the above operation. Overall, Thomas reports that he is doing well. Has no concerns or complaints.  He reports his range of motion has seemed to peaked and he has not made any further progress. He is aware that strengthening the shoulder will take time. Overall he is pleased with his progress and lack of pain in the left shoulder.     EXAM:  Constitutional: Well-developed, well-nourished, healthy appearing male.  Skin: Warm, dry, and without rashes.   Cardiac: Well perfused extremities, strong 2+ peripheral pulses. No edema.   Pulmonary: Non-labored respirations on room air without audible wheezes.   Musculoskeletal: Focused examination of the left shoulder demonstrates seated active forward elevation to 160 degrees, seated external rotation in abduction to 70 degrees, seated active external rotation at the side to 60 degrees, internal rotation of the back to T10.  Supine active forward elevation 170, external rotation in abduction to 85, internal rotation in abduction to 50 degrees.  Normal belly press, and strong external rotation at the side with improving strength in scaption and forward elevation to 4 out of 5.  Neurovascular exam is intact.    IMAGING:   No new imaging obtained.  Previous imaging reviewed.    IMPRESSION: 51 year old-year-old right hand dominant male now 4.5 months  status-post the above. Overall doing well.    PLAN:   I had a nice discussion with Thomas today.     I am very pleased with the progress he has made with his range of motion and his improving strength.  I would like him to continue to progress with strengthening and endurance training exercises with Diomedes Sarabia.  He should continue physical therapy for at least another 6 weeks.    Work restrictions have been advanced to no lifting greater than 20 pounds at the side, no lifting greater than 5 pounds overhead, and it is okay for him to continue driving.  Updated work note provided.    Return to clinic in 6 weeks with repeat left shoulder 4 view x-rays, repeat clinical evaluation, and we will likely send him for work hardening and functional capacity evaluation at that visit.     At the conclusion of the office visit, Thomas verbally acknowledged that I answered all of his questions satisfactorily.      Again, thank you for allowing me to participate in the care of your patient.        Sincerely,        David Boyd MD

## 2021-05-18 NOTE — PATIENT INSTRUCTIONS
Updated work restrictions.     Continue with Physical Therapy, will consider Work Hardening program after next office visit.     Follow up in 6 weeks for reevaluation.

## 2021-05-21 ENCOUNTER — THERAPY VISIT (OUTPATIENT)
Dept: PHYSICAL THERAPY | Facility: CLINIC | Age: 51
End: 2021-05-21
Payer: OTHER MISCELLANEOUS

## 2021-05-21 DIAGNOSIS — Z47.89 AFTERCARE FOLLOWING SURGERY OF THE MUSCULOSKELETAL SYSTEM: ICD-10-CM

## 2021-05-21 PROCEDURE — 97112 NEUROMUSCULAR REEDUCATION: CPT | Performed by: PHYSICAL THERAPIST

## 2021-05-21 PROCEDURE — 97110 THERAPEUTIC EXERCISES: CPT | Performed by: PHYSICAL THERAPIST

## 2021-06-04 ENCOUNTER — TELEPHONE (OUTPATIENT)
Dept: PHYSICAL THERAPY | Facility: CLINIC | Age: 51
End: 2021-06-04

## 2021-06-04 ENCOUNTER — THERAPY VISIT (OUTPATIENT)
Dept: PHYSICAL THERAPY | Facility: CLINIC | Age: 51
End: 2021-06-04
Payer: OTHER MISCELLANEOUS

## 2021-06-04 DIAGNOSIS — Z47.89 AFTERCARE FOLLOWING SURGERY OF THE MUSCULOSKELETAL SYSTEM: ICD-10-CM

## 2021-06-04 PROCEDURE — 97112 NEUROMUSCULAR REEDUCATION: CPT | Mod: GP | Performed by: PHYSICAL THERAPIST

## 2021-06-04 PROCEDURE — 97110 THERAPEUTIC EXERCISES: CPT | Mod: GP | Performed by: PHYSICAL THERAPIST

## 2021-06-04 NOTE — TELEPHONE ENCOUNTER
Left Message for Smita Park @ [facility] requesting one additional therapy visit for a total of 23 visits in order for Nick to continue with his physical therapy schedule leading up to his 6 month appointment with Dr. Boyd.    Aditya Sarabia, PT

## 2021-06-18 ENCOUNTER — THERAPY VISIT (OUTPATIENT)
Dept: PHYSICAL THERAPY | Facility: CLINIC | Age: 51
End: 2021-06-18
Payer: OTHER MISCELLANEOUS

## 2021-06-18 DIAGNOSIS — Z47.89 AFTERCARE FOLLOWING SURGERY OF THE MUSCULOSKELETAL SYSTEM: ICD-10-CM

## 2021-06-18 PROCEDURE — 97110 THERAPEUTIC EXERCISES: CPT | Mod: GP | Performed by: PHYSICAL THERAPIST

## 2021-06-18 PROCEDURE — 97112 NEUROMUSCULAR REEDUCATION: CPT | Mod: GP | Performed by: PHYSICAL THERAPIST

## 2021-06-18 NOTE — PROGRESS NOTES
DISCHARGE REPORT    Progress reporting period is from 6/18/21.       SUBJECTIVE  Nick is 5 months s/p revision left rotator cuff repair.  He has progressed considerably well over the last 2 months in his range of motion.  While he still has some strength to gain in his shoulder he has the tools moving forward to progress independently with his HEP.  Current Pain level: 1/10.     Initial Pain level: 3/10.   Changes in function:  Yes (See Goal flowsheet attached for changes in current functional level)  Adverse reaction to treatment or activity: None    OBJECTIVE  Supine PROM:     Flexion = 170   ER = 90   IR = 50      Strength:     Scaption = 3+/5   ER = 4+/5   IR = 5/5     ASSESSMENT/PLAN  Updated problem list and treatment plan: Diagnosis 1:  S/p left shoulder revision rotator cuff repair  Decreased strength - home program  STG/LTGs have been met or progress has been made towards goals:  Yes (See Goal flow sheet completed today.)  Assessment of Progress: The patient's condition is improving.  Self Management Plans:  Patient is independent in a home treatment program.  I have re-evaluated this patient and find that the nature, scope, duration and intensity of the therapy is appropriate for the medical condition of the patient.  Thomas continues to require the following intervention to meet STG and LTG's:  PT intervention is no longer required to meet STG/LTG.    Recommendations:  This patient is ready to be discharged from therapy and continue their home treatment program.    Please refer to the daily flowsheet for treatment today, total treatment time and time spent performing 1:1 timed codes.

## 2021-06-29 ENCOUNTER — OFFICE VISIT (OUTPATIENT)
Dept: ORTHOPEDICS | Facility: CLINIC | Age: 51
End: 2021-06-29
Payer: OTHER MISCELLANEOUS

## 2021-06-29 VITALS
HEIGHT: 72 IN | WEIGHT: 245 LBS | SYSTOLIC BLOOD PRESSURE: 144 MMHG | DIASTOLIC BLOOD PRESSURE: 78 MMHG | BODY MASS INDEX: 33.18 KG/M2

## 2021-06-29 DIAGNOSIS — M54.12 CERVICAL RADICULOPATHY: ICD-10-CM

## 2021-06-29 DIAGNOSIS — Z98.890 S/P ARTHROSCOPY OF LEFT SHOULDER: Primary | ICD-10-CM

## 2021-06-29 PROCEDURE — 99213 OFFICE O/P EST LOW 20 MIN: CPT | Performed by: ORTHOPAEDIC SURGERY

## 2021-06-29 ASSESSMENT — MIFFLIN-ST. JEOR: SCORE: 1994.31

## 2021-06-29 NOTE — LETTER
June 29, 2021      Thomas Pierce  [address] [facility] [address]        To Whom It May Concern:    Thomas Pierce was seen in our clinic. He may return to work with the following: limited to light duty - lifting, pushing or pulling no greater than 20 pounds with left arm away from the body. No lifting greater than 5 pounds overhead.  He is to follow up in 6 weeks for updated work restrictions.      Sincerely,        David Boyd MD

## 2021-06-29 NOTE — LETTER
6/29/2021         RE: Thomas Pierce  [address] [facility] [address]        Dear Colleague,    Thank you for referring your patient, Thomas Pierce, to the [facility] [place]. Please see a copy of my visit note below.    ORTHOPEDIC SURGERY POST-OPERATIVE VISIT NOTE    DATE OF SURGERY: 1/13/2021    OPERATION PERFORMED:  Left shoulder diagnostic arthroscopy, capsular release and resection, arthroscopic revision rotator cuff repair of the supraspinatus tendon    INTERVAL HISTORY: Thomas is a 51 year old, right-hand dominant male who returns today now nearly 6 months out from the above operation. Overall, Thomas reports that he is doing well. Has no concerns or complaints.  He reports his range of motion has seemed to peaked and he has not made any further progress. He is aware that strengthening the shoulder will take time. Overall he is pleased with his progress and lack of pain in the left shoulder.     EXAM:  Constitutional: Well-developed, well-nourished, healthy appearing male.  Skin: Warm, dry, and without rashes.   Cardiac: Well perfused extremities, strong 2+ peripheral pulses. No edema.   Pulmonary: Non-labored respirations on room air without audible wheezes.   Musculoskeletal: Focused examination of the left shoulder demonstrates seated active forward elevation to 160 degrees, seated external rotation in abduction to 70 degrees, seated active external rotation at the side to 60 degrees, internal rotation of the back to T10.  Supine active forward elevation 170, external rotation in abduction to 85, internal rotation in abduction to 50 degrees.  Normal belly press, and strong external rotation at the side with improving strength in scaption and forward elevation to 5- out of 5.  Neurovascular exam is intact.    IMAGING:   No new imaging obtained.  Previous imaging reviewed.    IMPRESSION: 51 year old-year-old right hand dominant male now 6 months  status-post the above. Overall doing well.    PLAN:   I had a nice discussion with Thomas today.     I am very pleased with the progress he has made with his range of motion and his improving strength.  He should continue to work diligently on his strengthening exercises.  He will continue to build strength and stamina about the shoulder over time, but it is now safe for him to progress his activities.  At this stage, he is appropriate to send for work hardening and a functional capacity evaluation with occupational therapy to determine whether or not he can return to his work.    Referral made for his cervical spine to evaluate for possible C6 radiculopathy on the left.    Return to clinic after he completes work hardening and his functional capacity evaluation.  If cleared by those assessments he should be able to return to work thereafter.    At the conclusion of the office visit, Thomas verbally acknowledged that I answered all of his questions satisfactorily.      Again, thank you for allowing me to participate in the care of your patient.        Sincerely,        David Boyd MD

## 2021-06-29 NOTE — PATIENT INSTRUCTIONS
Referral for Work Hardening Program    Referral to Spine Specialist     Follow up with Dr. Boyd in 6 weeks.    Call my office with any questions or concerns, [phone].

## 2021-06-29 NOTE — PROGRESS NOTES
ORTHOPEDIC SURGERY POST-OPERATIVE VISIT NOTE    DATE OF SURGERY: 1/13/2021    OPERATION PERFORMED:  Left shoulder diagnostic arthroscopy, capsular release and resection, arthroscopic revision rotator cuff repair of the supraspinatus tendon    INTERVAL HISTORY: Thomas is a 51 year old, right-hand dominant male who returns today now nearly 6 months out from the above operation. Overall, Thomas reports that he is doing well. Has no concerns or complaints.  He reports his range of motion has seemed to peaked and he has not made any further progress. He is aware that strengthening the shoulder will take time. Overall he is pleased with his progress and lack of pain in the left shoulder.     EXAM:  Constitutional: Well-developed, well-nourished, healthy appearing male.  Skin: Warm, dry, and without rashes.   Cardiac: Well perfused extremities, strong 2+ peripheral pulses. No edema.   Pulmonary: Non-labored respirations on room air without audible wheezes.   Musculoskeletal: Focused examination of the left shoulder demonstrates seated active forward elevation to 160 degrees, seated external rotation in abduction to 70 degrees, seated active external rotation at the side to 60 degrees, internal rotation of the back to T10.  Supine active forward elevation 170, external rotation in abduction to 85, internal rotation in abduction to 50 degrees.  Normal belly press, and strong external rotation at the side with improving strength in scaption and forward elevation to 5- out of 5.  Neurovascular exam is intact.    IMAGING:   No new imaging obtained.  Previous imaging reviewed.    IMPRESSION: 51 year old-year-old right hand dominant male now 6 months status-post the above. Overall doing well.    PLAN:   I had a nice discussion with Thomas today.     I am very pleased with the progress he has made with his range of motion and his improving strength.  He should continue to work diligently on his strengthening  exercises.  He will continue to build strength and stamina about the shoulder over time, but it is now safe for him to progress his activities.  At this stage, he is appropriate to send for work hardening and a functional capacity evaluation with occupational therapy to determine whether or not he can return to his work.    Referral made for his cervical spine to evaluate for possible C6 radiculopathy on the left.    Return to clinic after he completes work hardening and his functional capacity evaluation.  If cleared by those assessments he should be able to return to work thereafter.    At the conclusion of the office visit, Thomas verbally acknowledged that I answered all of his questions satisfactorily.

## 2021-07-09 ENCOUNTER — OFFICE VISIT (OUTPATIENT)
Dept: ORTHOPEDICS | Facility: CLINIC | Age: 51
End: 2021-07-09
Attending: ORTHOPAEDIC SURGERY
Payer: COMMERCIAL

## 2021-07-09 ENCOUNTER — TRANSFERRED RECORDS (OUTPATIENT)
Dept: HEALTH INFORMATION MANAGEMENT | Facility: CLINIC | Age: 51
End: 2021-07-09

## 2021-07-09 VITALS
BODY MASS INDEX: 33.53 KG/M2 | HEART RATE: 92 BPM | OXYGEN SATURATION: 99 % | WEIGHT: 247.2 LBS | SYSTOLIC BLOOD PRESSURE: 139 MMHG | DIASTOLIC BLOOD PRESSURE: 83 MMHG

## 2021-07-09 DIAGNOSIS — M54.12 CERVICAL RADICULOPATHY: ICD-10-CM

## 2021-07-09 DIAGNOSIS — M47.812 FACET ARTHROPATHY, CERVICAL: ICD-10-CM

## 2021-07-09 PROCEDURE — 99204 OFFICE O/P NEW MOD 45 MIN: CPT | Performed by: PHYSICAL MEDICINE & REHABILITATION

## 2021-07-09 ASSESSMENT — ENCOUNTER SYMPTOMS
COUGH: 0
HEARTBURN: 0
MYALGIAS: 0
SEIZURES: 0
DIARRHEA: 0
BRUISES/BLEEDS EASILY: 0
ARTHRALGIAS: 0
CONSTIPATION: 0
DYSURIA: 0
LEG SWELLING: 0
NAUSEA: 0
NERVOUS/ANXIOUS: 0
HEADACHES: 0
BOWEL INCONTINENCE: 0
WHEEZING: 0
DIZZINESS: 0
POOR WOUND HEALING: 0
SWOLLEN GLANDS: 0
DEPRESSION: 0
PALPITATIONS: 0
VOMITING: 0
EYE PAIN: 0
FEVER: 0
BLOOD IN STOOL: 0
WEIGHT GAIN: 0
HOARSE VOICE: 0
FATIGUE: 0
INSOMNIA: 0
TROUBLE SWALLOWING: 0
DIFFICULTY URINATING: 0
HEMATURIA: 0
LOSS OF CONSCIOUSNESS: 0
SHORTNESS OF BREATH: 0
WEIGHT LOSS: 0
ABDOMINAL PAIN: 0

## 2021-07-09 NOTE — NURSING NOTE
July 9, 2021 1:08 PM   Thomas Pierce is a 51 year old male who presents for:    Chief Complaint   Patient presents with     Neck - Pain, Numbness     Initial Vitals: /83   Pulse 92   Wt 112.1 kg (247 lb 3.2 oz)   SpO2 99%   BMI 33.53 kg/m   Estimated body mass index is 33.53 kg/m  as calculated from the following:    Height as of 6/29/21: 1.829 m (6').    Weight as of this encounter: 112.1 kg (247 lb 3.2 oz). Body surface area is 2.39 meters squared.  Data Unavailable Comment: Data Unavailable       Clinical concerns: L arm pain and numbness  Jerry Villanueva, ATC

## 2021-07-09 NOTE — PATIENT INSTRUCTIONS
Please schedule MRI cervical spine.  Please schedule follow-up appointment after MRI.    The [facility] team will call you to schedule your imaging exam in the next 1-2 days. You can also call them directly at [facility] [phone] ([address], [address], [place], [place]) and [facility] [phone] ([address], [place]) to schedule your appointment.

## 2021-07-09 NOTE — LETTER
7/9/2021         RE: Thomas Pierce  [address] [facility] [address]        Dear Colleague,    Thank you for referring your patient, Thomas Pierce, to the [facility] [place]. Please see a copy of my visit note below.    PHYSICAL MEDICINE & REHABILITATION / MEDICAL SPINE        Date:  Jul 9, 2021    Name:  Thomas Pierce  YOB: 1970  MRN:  [id number]        REASON FOR CONSULTATION:  Cervical radiculopathy      REFERRING PROVIDER:  David Boyd MD      CHART REVIEW:  Reviewed 06/29/2021 note from David Boyd MD (orthopedic surgery).  Mr. Thomas Pierce is a 51-year-old, right-hand-dominant, male who was being seen nearly 6 months after his left shoulder surgery.  Mr. Pierce reported that he was doing well.  His range of motion had peaked, and he had not made further progress.  Mr. Pierce was aware that strengthening the shoulder would take time.  Referral was placed to evaluate for possible left C6 radiculopathy.  Mr. Thomas Pierce was also referred for work hardening and functional capacity evaluation.    On 01/13/2021, David Boyd MD (orthopedic surgery) performed left shoulder diagnostic arthroscopy, left shoulder arthroscopic capsular release and resection, left shoulder arthroscopic revision rotator cuff repair of the supraspinatus tendon for left shoulder complete rotator cuff retear of the supraspinatus tendon, left shoulder stiffness, left shoulder subacromial bursitis.        HISTORY OF PRESENT ILLNESS:  Mr. Thomas Pierce is a 51-year-old, right-hand-dominant, male.  In terms of entertainment, Mr. Pierce enjoys boating.    On 01/13/2021, David Boyd MD (orthopedic surgery) performed left shoulder diagnostic arthroscopy, left shoulder arthroscopic capsular release and resection, left shoulder arthroscopic revision rotator cuff repair of the supraspinatus tendon for left  "shoulder complete rotator cuff retear of the supraspinatus tendon, left shoulder stiffness, left shoulder subacromial bursitis.  Mr. Thomas Pierce works as a .  He has been off work since his left shoulder surgery on 01/13/2021.  Mr. Pierce is doing his therapy program for his left shoulder daily.    Mr. Thomas Pierce had a right acromioclavicular joint separation in the past.  Approximately 1-1/2 years ago, he fell on the ice and tore his left rotator cuff.  Mr. Thomas Pierce denies any other injuries of his head, neck, upper back, shoulders, arms, elbows, forearms, wrists, hands, fingers.    Approximately 6 weeks ago, Mr. Thomas Pierce had insidious onset of left posterolateral neck pain radiating to the left upper trapezius, left upper extremity, and into the left first and second fingers.  Pain was more prominent initially, but now, paresthesias have become more prominent.  Pain has become more intermittent.  Paresthesias are frequent.  Intermittently, Mr. Pierce will notice a \"stinger\" extending into his left upper extremity along the C6 distribution.  He does note some stiffness in his neck.  The paresthesias in his left upper extremity are most prominent in the left first and second fingers.  He has numbness, tingling, pins-and-needles in the left C6 distribution.  He denies any other numbness, tingling, pins-and-needles.  Pain and paresthesias in the neck and left upper extremity are worsened with cervical extension.  Forward flexion seems to improve this.    Mr. Thomas Pierce is currently sleeping in a recliner.  His neck pain with radiation to left upper extremity does not keep him awake at night nor wake him up at night.  Mr. Thomas Pierce is not taking any pain medications.  For this episode of neck pain with radiation into the left upper extremity, Mr. Pierce has not tried acupuncture, chiropractic treatment, injections, massage.  He " states the physical therapist, whom he has been seeing for his left shoulder rehabilitation, did provide some exercises for his neck; Mr. Pierce did not notice any benefit with those exercises.    Mr. Thomas Pierce denies any lightheadedness, dizziness, balance problems.  He denies any weakness.  Mr. Pierce denies dropping objects.  Mr. Pierce does note some popping in his neck.  Mr. Thomas Pierce does note some problems with overhead activities related to his left shoulder surgery and recovery.  Mr. Pierce denies any give way episodes of his lower extremities.  He denies any tripping, stumbling, falling.  He is not using any assistive devices for ambulation.  Mr. Pierce denies any saddle anesthesia, bowel incontinence, bladder incontinence.        REVIEW OF SYSTEMS:  Review of Systems     Constitutional:  Negative for fever, weight loss, weight gain and fatigue.   HENT:  Negative for tinnitus, trouble swallowing and hoarse voice.    Eyes:  Negative for pain, eye pain and decreased vision.   Respiratory:   Negative for cough, shortness of breath and wheezing.    Cardiovascular:  Negative for chest pain, palpitations and leg swelling.   Gastrointestinal:  Negative for heartburn, nausea, vomiting, abdominal pain, diarrhea, constipation, blood in stool and bowel incontinence.   Genitourinary:  Negative for bladder incontinence, dysuria, hematuria and difficulty urinating.   Musculoskeletal:  Negative for myalgias and arthralgias.   Skin:  Negative for itching, poor wound healing and poor wound healing.   Neurological:  Negative for dizziness, seizures, loss of consciousness, headaches and difficulty walking.   Endo/Heme:  Negative for anemia, swollen glands and bruises/bleeds easily.   Psychiatric/Behavioral:  Negative for depression.            ALLERGIES:  No Known Allergies      MEDICATIONS:  Current Outpatient Medications   Medication Sig Dispense Refill     aspirin 81 MG tablet Take 1  tablet by mouth daily.       blood glucose monitoring ([employer] MICROLET) lancets Use to test blood sugar 1-2 times daily. 100 each 6     chlorthalidone (HYGROTON) 25 MG tablet Take 1 tablet (25 mg) by mouth daily 90 tablet 1     CONTOUR NEXT TEST test strip Use to test blood sugar 1-2 times daily. 100 strip 3     dulaglutide (TRULICITY) 0.75 MG/0.5ML pen Inject 0.75 mg Subcutaneous every 7 days 6 mL 1     lisinopril (ZESTRIL) 40 MG tablet Take 1 tablet (40 mg) by mouth daily 90 tablet 1     metFORMIN (GLUCOPHAGE-XR) 500 MG 24 hr tablet Take 4 tablets (2,000 mg) by mouth At Bedtime 360 tablet 1     rosuvastatin (CRESTOR) 20 MG tablet Take 1 tablet (20 mg) by mouth At Bedtime 90 tablet 1         PAST MEDICAL HISTORY:  Past Medical History:   Diagnosis Date     Hyperlipidemia LDL goal <160 10/20/2011     Hypertension goal BP (blood pressure) < 140/90 10/20/2011     Prediabetes          PAST SURGICAL HISTORY:  Past Surgical History:   Procedure Laterality Date     ARTHROSCOPY SHOULDER ROTATOR CUFF REPAIR Left 1/13/2021    Procedure: LEFT ARTHROSCOPY, SHOULDER, WITH REVISION ROTATOR CUFF REPAIR, SUBACROMIAL DECOMPRESSION AND CUPSULAR RELEASE;  Surgeon: David Boyd MD;  Location: [facility] OR     ARTHROSCOPY SHOULDER ROTATOR CUFF REPAIR, BICEP TENODESIS REPAIR Left 5/12/2020    Procedure: LEFT SHOULDER ARTHROSCOPIC ROTATOR CUFF REPAIR, BICEPS TENODESIS, SUBACROMIAL DECOMPRESSION,  DEBRIDEMENT;  Surgeon: David Boyd MD;  Location: [facility] OR     MANDIBLE SURGERY  1978    trauma while sledding         FAMILY HISTORY:  Family History   Problem Relation Age of Onset     Diabetes Mother         Type II     Hypertension Mother      Hyperlipidemia Mother      Cardiovascular Maternal Grandmother         PAD     Hepatitis Maternal Grandmother      Colon Polyps Cousin      Ovarian Cancer Maternal Aunt      Colon Cancer No family hx of      Prostate Cancer No family hx of      Coronary Artery Disease No family hx of       Cerebrovascular Disease No family hx of          SOCIAL HISTORY:  Social History     Socioeconomic History     Marital status:      Spouse name: Lucy     Number of children: 0     Years of education: Not on file     Highest education level: Not on file   Occupational History     Occupation:    Social Needs     Financial resource strain: Not on file     Food insecurity     Worry: Not on file     Inability: Not on file     Transportation needs     Medical: Not on file     Non-medical: Not on file   Tobacco Use     Smoking status: Never Smoker     Smokeless tobacco: Never Used   Substance and Sexual Activity     Alcohol use: Yes     Comment: occasionally      Drug use: No     Sexual activity: Never   Lifestyle     Physical activity     Days per week: Not on file     Minutes per session: Not on file     Stress: Not on file   Relationships     Social connections     Talks on phone: Not on file     Gets together: Not on file     Attends Jainism service: Not on file     Active member of club or organization: Not on file     Attends meetings of clubs or organizations: Not on file     Relationship status: Not on file     Intimate partner violence     Fear of current or ex partner: Not on file     Emotionally abused: Not on file     Physically abused: Not on file     Forced sexual activity: Not on file   Other Topics Concern     Parent/sibling w/ CABG, MI or angioplasty before 65F 55M? No   Social History Narrative     Not on file         PHYSICAL EXAMINATION:  Vitals:    07/09/21 1307   BP: 139/83   Pulse: 92   SpO2: 99%   Weight: 112.1 kg (247 lb 3.2 oz)       GENERAL:  No acute distress.  Pleasant and cooperative.   PSYCH:  Normal mood and affect.  HEAD:  Normocephalic.  SPEECH:  No dysarthria.  EYES:  No scleral icterus.  EARS:  Hearing is intact to spoken voice.  NOSE/MOUTH:  Wearing a face mask.  LUNGS:  No respiratory distress.  No increased work of breathing.  VASCULAR/PULSES:  Radial:   Right 2+.  Left 2+.  UPPER EXTREMITIES:  No clubbing, cyanosis, or edema bilaterally.    BALANCE AND GAIT:   The patient is reciprocal gait pattern without antalgia.  Is able heel walk, toe walk, tandem walk without difficulty..    INSPECTION:   There is no erythema, ecchymosis, deformity of the neck.  The patient has mild forward head posture.  The patient has slight neck tilt to the right.    CERVICAL PALPATION:  Inion:  Not tender.  Greater Occipital Nerve:  Right not tender.  Left not tender.  Lesser Occipital Nerve:  Right not tender.  Left not tender.  Cervical Spinous Processes:  not tender.  Cervical Paraspinals:  Right not tender.  Left not tender.  Splenius Capitis:  Right not tender.  Left not tender.  Levator Scapulae at the Neck:  Right not tender.  Left not tender.  Cervical Facet Joints:  Right not tender.  Left not tender.  Longissimus:  Right not tender.  Left not tender.  Anterior, Middle, Posterior Scalenes:  Right not tender.  Left not tender.  Sternocleidomastoid:  Right not tender.  Left not tender.  Trapezius:  Right not tender.  Left not tender.    THORACIC PALPATION:  Thoracic Spinous Processes:  not tender.  Thoracic Paraspinals:  Right not tender.  Left not tender.  Levator Scapulae at the Scapular Insertion:  Right not tender.  Left not tender.  Rhomboid Minor:  Right not tender.  Left not tender.  Rhomboid Major:  Right not tender.  Left not tender.    CERVICAL RANGE OF MOTION:  Forward Flexion (40 ): Normal range of motion, does not cause pain, does not cause radiating pain.  Extension (40 ): Mildly reduced range of motion, increases left posterolateral neck pain and pain radiating to the left upper extremity.  Rotating Right (45 ):   Normal range of motion, does not cause pain, does not cause radiating pain.  Rotating Left (45 ):   Mildly to moderately reduced range of motion, does not cause pain, does not cause radiating pain.  Lateral Bending Right (40 ):   Normal range of motion,  does not cause pain, does not cause radiating pain.  Lateral Bending Left (40 ):   Moderately reduced range of motion, does not cause pain, does not cause radiating pain.    SHOULDER RANGE OF MOTION:  Active Forward Flexion (180 ):  Right 180 .  Left 165 .  Active Extension (60 ):  Right 50 .  Left 50 .  Active Abduction (180 ):  Right 150 .  Left 150 .  Scapular Dyskinesis:  Right mild.  Left mild to moderate.    STRENGTH:  Shoulder abduction:  Right 5/5.  Left 4/5.  Elbow flexion:  Right 5/5.  Left 4+/5.  Wrist extension:  Right 5/5.  Left 4+/5.  Elbow extension:  Right 5/5.  Left 4+/5.  Wrist flexion:  Right 5/5.  Left 4+/5.  Finger flexion:  Right 5/5.  Left 5/5.  Finger abduction:  Right 5/5.  Left 5/5.    SENSATION:  Intact to light touch along right C3, C4, C5, C6, C7, C8, T1, T2.  Intact to light touch along left C3, C4, C5, C6, C7, C8, T1, T2.    REFLEXES:  Biceps (C5-C6):  Right 2+.  Left 1+.  Brachioradialis (C5-C6):  Right 2+.  Left 1+.  Triceps (C7-C8):  Right 1+.  Left 1+.  Barrios's:  Right -.  Left -.    CERVICAL SPECIAL TESTS:  Facet Loading:  Right -.  Left equivocal.  Spurling Neck Compression:  Right -.  Left +.    SHOULDER SPECIAL TESTS:  Drop Arm:  Right - . Left -.    WRIST/HAND SPECIAL TESTS:  Tinel's at cubital tunnel:  Right -.  Left -.  Tinel's at Guyon's canal:  Right -.  Left -.  Tinel's at carpal tunnel:  Right -.  Left -.  Carpal compression:  Right -.  Left -.        IMAGING:  There is no cervical spine imaging available.        ASSESSMENT/PLAN:  Mr. Thomas Pierce is a 51-year-old, right-hand-dominant, male.  History and exam are most consistent with left cervical radiculopathy (C6).  He may have some left cervical facet arthropathy as well.  Discussed diagnoses, pathophysiology, treatment options with Mr. Pierce.  Discussed the options of doing nothing/living with it, physical therapy, chiropractic care, oral medications such as gabapentin and pregabalin, MRI of the  cervical spine, cervical epidural corticosteroid injection, referral to spine surgeon.  Discussed the risks of immunosuppression with corticosteroid use in light of the COVID-19 pandemic with Mr. Pierce.  Discussed the risks and benefits of a cervical epidural corticosteroid injection.  Mr. Pierce will be set up for an MRI of his cervical spine.  He is to follow-up in this clinic after the MRI of the cervical spine.        David Bueno MD          Again, thank you for allowing me to participate in the care of your patient.        Sincerely,        David Bueno MD

## 2021-07-09 NOTE — PROGRESS NOTES
PHYSICAL MEDICINE & REHABILITATION / MEDICAL SPINE        Date:  Jul 9, 2021    Name:  Thomas Pierce  YOB: 1970  MRN:  [id number]        REASON FOR CONSULTATION:  Cervical radiculopathy      REFERRING PROVIDER:  David Boyd MD      CHART REVIEW:  Reviewed 06/29/2021 note from David Boyd MD (orthopedic surgery).  Mr. Thomas Pierce is a 51-year-old, right-hand-dominant, male who was being seen nearly 6 months after his left shoulder surgery.  Mr. Pierce reported that he was doing well.  His range of motion had peaked, and he had not made further progress.  Mr. Pierce was aware that strengthening the shoulder would take time.  Referral was placed to evaluate for possible left C6 radiculopathy.  Mr. Thomas Pierce was also referred for work hardening and functional capacity evaluation.    On 01/13/2021, David Boyd MD (orthopedic surgery) performed left shoulder diagnostic arthroscopy, left shoulder arthroscopic capsular release and resection, left shoulder arthroscopic revision rotator cuff repair of the supraspinatus tendon for left shoulder complete rotator cuff retear of the supraspinatus tendon, left shoulder stiffness, left shoulder subacromial bursitis.        HISTORY OF PRESENT ILLNESS:  Mr. Thomas Pierce is a 51-year-old, right-hand-dominant, male.  In terms of entertainment, Mr. Pierce enjoys boating.    On 01/13/2021, David Boyd MD (orthopedic surgery) performed left shoulder diagnostic arthroscopy, left shoulder arthroscopic capsular release and resection, left shoulder arthroscopic revision rotator cuff repair of the supraspinatus tendon for left shoulder complete rotator cuff retear of the supraspinatus tendon, left shoulder stiffness, left shoulder subacromial bursitis.  Mr. Thomas Pierce works as a .  He has been off work since his left shoulder surgery on 01/13/2021.  Mr. Pierce is doing his  "therapy program for his left shoulder daily.    Mr. Thomas Pierce had a right acromioclavicular joint separation in the past.  Approximately 1-1/2 years ago, he fell on the ice and tore his left rotator cuff.  Mr. Thomas Pierce denies any other injuries of his head, neck, upper back, shoulders, arms, elbows, forearms, wrists, hands, fingers.    Approximately 6 weeks ago, Mr. Thomas Pierce had insidious onset of left posterolateral neck pain radiating to the left upper trapezius, left upper extremity, and into the left first and second fingers.  Pain was more prominent initially, but now, paresthesias have become more prominent.  Pain has become more intermittent.  Paresthesias are frequent.  Intermittently, Mr. Pierce will notice a \"stinger\" extending into his left upper extremity along the C6 distribution.  He does note some stiffness in his neck.  The paresthesias in his left upper extremity are most prominent in the left first and second fingers.  He has numbness, tingling, pins-and-needles in the left C6 distribution.  He denies any other numbness, tingling, pins-and-needles.  Pain and paresthesias in the neck and left upper extremity are worsened with cervical extension.  Forward flexion seems to improve this.    Mr. Thomas Pierce is currently sleeping in a recliner.  His neck pain with radiation to left upper extremity does not keep him awake at night nor wake him up at night.  Mr. Thomas Pierce is not taking any pain medications.  For this episode of neck pain with radiation into the left upper extremity, Mr. Pierce has not tried acupuncture, chiropractic treatment, injections, massage.  He states the physical therapist, whom he has been seeing for his left shoulder rehabilitation, did provide some exercises for his neck; Mr. Pierce did not notice any benefit with those exercises.    Mr. Thomas Pierce denies any lightheadedness, dizziness, balance " problems.  He denies any weakness.  Mr. Pierce denies dropping objects.  Mr. Pierce does note some popping in his neck.  Mr. Thomas Pierce does note some problems with overhead activities related to his left shoulder surgery and recovery.  Mr. Pierce denies any give way episodes of his lower extremities.  He denies any tripping, stumbling, falling.  He is not using any assistive devices for ambulation.  Mr. Pierce denies any saddle anesthesia, bowel incontinence, bladder incontinence.        REVIEW OF SYSTEMS:  Review of Systems     Constitutional:  Negative for fever, weight loss, weight gain and fatigue.   HENT:  Negative for tinnitus, trouble swallowing and hoarse voice.    Eyes:  Negative for pain, eye pain and decreased vision.   Respiratory:   Negative for cough, shortness of breath and wheezing.    Cardiovascular:  Negative for chest pain, palpitations and leg swelling.   Gastrointestinal:  Negative for heartburn, nausea, vomiting, abdominal pain, diarrhea, constipation, blood in stool and bowel incontinence.   Genitourinary:  Negative for bladder incontinence, dysuria, hematuria and difficulty urinating.   Musculoskeletal:  Negative for myalgias and arthralgias.   Skin:  Negative for itching, poor wound healing and poor wound healing.   Neurological:  Negative for dizziness, seizures, loss of consciousness, headaches and difficulty walking.   Endo/Heme:  Negative for anemia, swollen glands and bruises/bleeds easily.   Psychiatric/Behavioral:  Negative for depression.            ALLERGIES:  No Known Allergies      MEDICATIONS:  Current Outpatient Medications   Medication Sig Dispense Refill     aspirin 81 MG tablet Take 1 tablet by mouth daily.       blood glucose monitoring (ROSEANNE MICROLET) lancets Use to test blood sugar 1-2 times daily. 100 each 6     chlorthalidone (HYGROTON) 25 MG tablet Take 1 tablet (25 mg) by mouth daily 90 tablet 1     CONTOUR NEXT TEST test strip Use to test blood  sugar 1-2 times daily. 100 strip 3     dulaglutide (TRULICITY) 0.75 MG/0.5ML pen Inject 0.75 mg Subcutaneous every 7 days 6 mL 1     lisinopril (ZESTRIL) 40 MG tablet Take 1 tablet (40 mg) by mouth daily 90 tablet 1     metFORMIN (GLUCOPHAGE-XR) 500 MG 24 hr tablet Take 4 tablets (2,000 mg) by mouth At Bedtime 360 tablet 1     rosuvastatin (CRESTOR) 20 MG tablet Take 1 tablet (20 mg) by mouth At Bedtime 90 tablet 1         PAST MEDICAL HISTORY:  Past Medical History:   Diagnosis Date     Hyperlipidemia LDL goal <160 10/20/2011     Hypertension goal BP (blood pressure) < 140/90 10/20/2011     Prediabetes          PAST SURGICAL HISTORY:  Past Surgical History:   Procedure Laterality Date     ARTHROSCOPY SHOULDER ROTATOR CUFF REPAIR Left 1/13/2021    Procedure: LEFT ARTHROSCOPY, SHOULDER, WITH REVISION ROTATOR CUFF REPAIR, SUBACROMIAL DECOMPRESSION AND CUPSULAR RELEASE;  Surgeon: David Boyd MD;  Location: [facility]     ARTHROSCOPY SHOULDER ROTATOR CUFF REPAIR, BICEP TENODESIS REPAIR Left 5/12/2020    Procedure: LEFT SHOULDER ARTHROSCOPIC ROTATOR CUFF REPAIR, BICEPS TENODESIS, SUBACROMIAL DECOMPRESSION,  DEBRIDEMENT;  Surgeon: David Boyd MD;  Location: [facility] OR     MANDIBLE SURGERY  1978    trauma while sledding         FAMILY HISTORY:  Family History   Problem Relation Age of Onset     Diabetes Mother         Type II     Hypertension Mother      Hyperlipidemia Mother      Cardiovascular Maternal Grandmother         PAD     Hepatitis Maternal Grandmother      Colon Polyps Cousin      Ovarian Cancer Maternal Aunt      Colon Cancer No family hx of      Prostate Cancer No family hx of      Coronary Artery Disease No family hx of      Cerebrovascular Disease No family hx of          SOCIAL HISTORY:  Social History     Socioeconomic History     Marital status:      Spouse name: Lucy     Number of children: 0     Years of education: Not on file     Highest education level: Not on file   Occupational  History     Occupation:    Social Needs     Financial resource strain: Not on file     Food insecurity     Worry: Not on file     Inability: Not on file     Transportation needs     Medical: Not on file     Non-medical: Not on file   Tobacco Use     Smoking status: Never Smoker     Smokeless tobacco: Never Used   Substance and Sexual Activity     Alcohol use: Yes     Comment: occasionally      Drug use: No     Sexual activity: Never   Lifestyle     Physical activity     Days per week: Not on file     Minutes per session: Not on file     Stress: Not on file   Relationships     Social connections     Talks on phone: Not on file     Gets together: Not on file     Attends Mu-ism service: Not on file     Active member of club or organization: Not on file     Attends meetings of clubs or organizations: Not on file     Relationship status: Not on file     Intimate partner violence     Fear of current or ex partner: Not on file     Emotionally abused: Not on file     Physically abused: Not on file     Forced sexual activity: Not on file   Other Topics Concern     Parent/sibling w/ CABG, MI or angioplasty before 65F 55M? No   Social History Narrative     Not on file         PHYSICAL EXAMINATION:  Vitals:    07/09/21 1307   BP: 139/83   Pulse: 92   SpO2: 99%   Weight: 112.1 kg (247 lb 3.2 oz)       GENERAL:  No acute distress.  Pleasant and cooperative.   PSYCH:  Normal mood and affect.  HEAD:  Normocephalic.  SPEECH:  No dysarthria.  EYES:  No scleral icterus.  EARS:  Hearing is intact to spoken voice.  NOSE/MOUTH:  Wearing a face mask.  LUNGS:  No respiratory distress.  No increased work of breathing.  VASCULAR/PULSES:  Radial:  Right 2+.  Left 2+.  UPPER EXTREMITIES:  No clubbing, cyanosis, or edema bilaterally.    BALANCE AND GAIT:   The patient is reciprocal gait pattern without antalgia.  Is able heel walk, toe walk, tandem walk without difficulty..    INSPECTION:   There is no erythema,  ecchymosis, deformity of the neck.  The patient has mild forward head posture.  The patient has slight neck tilt to the right.    CERVICAL PALPATION:  Inion:  Not tender.  Greater Occipital Nerve:  Right not tender.  Left not tender.  Lesser Occipital Nerve:  Right not tender.  Left not tender.  Cervical Spinous Processes:  not tender.  Cervical Paraspinals:  Right not tender.  Left not tender.  Splenius Capitis:  Right not tender.  Left not tender.  Levator Scapulae at the Neck:  Right not tender.  Left not tender.  Cervical Facet Joints:  Right not tender.  Left not tender.  Longissimus:  Right not tender.  Left not tender.  Anterior, Middle, Posterior Scalenes:  Right not tender.  Left not tender.  Sternocleidomastoid:  Right not tender.  Left not tender.  Trapezius:  Right not tender.  Left not tender.    THORACIC PALPATION:  Thoracic Spinous Processes:  not tender.  Thoracic Paraspinals:  Right not tender.  Left not tender.  Levator Scapulae at the Scapular Insertion:  Right not tender.  Left not tender.  Rhomboid Minor:  Right not tender.  Left not tender.  Rhomboid Major:  Right not tender.  Left not tender.    CERVICAL RANGE OF MOTION:  Forward Flexion (40 ): Normal range of motion, does not cause pain, does not cause radiating pain.  Extension (40 ): Mildly reduced range of motion, increases left posterolateral neck pain and pain radiating to the left upper extremity.  Rotating Right (45 ):   Normal range of motion, does not cause pain, does not cause radiating pain.  Rotating Left (45 ):   Mildly to moderately reduced range of motion, does not cause pain, does not cause radiating pain.  Lateral Bending Right (40 ):   Normal range of motion, does not cause pain, does not cause radiating pain.  Lateral Bending Left (40 ):   Moderately reduced range of motion, does not cause pain, does not cause radiating pain.    SHOULDER RANGE OF MOTION:  Active Forward Flexion (180 ):  Right 180 .  Left 165 .  Active  Extension (60 ):  Right 50 .  Left 50 .  Active Abduction (180 ):  Right 150 .  Left 150 .  Scapular Dyskinesis:  Right mild.  Left mild to moderate.    STRENGTH:  Shoulder abduction:  Right 5/5.  Left 4/5.  Elbow flexion:  Right 5/5.  Left 4+/5.  Wrist extension:  Right 5/5.  Left 4+/5.  Elbow extension:  Right 5/5.  Left 4+/5.  Wrist flexion:  Right 5/5.  Left 4+/5.  Finger flexion:  Right 5/5.  Left 5/5.  Finger abduction:  Right 5/5.  Left 5/5.    SENSATION:  Intact to light touch along right C3, C4, C5, C6, C7, C8, T1, T2.  Intact to light touch along left C3, C4, C5, C6, C7, C8, T1, T2.    REFLEXES:  Biceps (C5-C6):  Right 2+.  Left 1+.  Brachioradialis (C5-C6):  Right 2+.  Left 1+.  Triceps (C7-C8):  Right 1+.  Left 1+.  Barrios's:  Right -.  Left -.    CERVICAL SPECIAL TESTS:  Facet Loading:  Right -.  Left equivocal.  Spurling Neck Compression:  Right -.  Left +.    SHOULDER SPECIAL TESTS:  Drop Arm:  Right - . Left -.    WRIST/HAND SPECIAL TESTS:  Tinel's at cubital tunnel:  Right -.  Left -.  Tinel's at Guyon's canal:  Right -.  Left -.  Tinel's at carpal tunnel:  Right -.  Left -.  Carpal compression:  Right -.  Left -.        IMAGING:  There is no cervical spine imaging available.        ASSESSMENT/PLAN:  Mr. Thomas Pierce is a 51-year-old, right-hand-dominant, male.  History and exam are most consistent with left cervical radiculopathy (C6).  He may have some left cervical facet arthropathy as well.  Discussed diagnoses, pathophysiology, treatment options with Mr. Pierce.  Discussed the options of doing nothing/living with it, physical therapy, chiropractic care, oral medications such as gabapentin and pregabalin, MRI of the cervical spine, cervical epidural corticosteroid injection, referral to spine surgeon.  Discussed the risks of immunosuppression with corticosteroid use in light of the COVID-19 pandemic with Mr. Pierce.  Discussed the risks and benefits of a cervical epidural  corticosteroid injection.  Mr. Pierce will be set up for an MRI of his cervical spine.  He is to follow-up in this clinic after the MRI of the cervical spine.        David Bueno MD

## 2021-07-12 ENCOUNTER — TELEPHONE (OUTPATIENT)
Dept: ORTHOPEDICS | Facility: CLINIC | Age: 51
End: 2021-07-12

## 2021-07-12 NOTE — TELEPHONE ENCOUNTER
Receipt of Work Hardening/ Conditioning Evaluation.   Copy of report sent to scan, and copy left on provider's desk for review.     Dena Mayer, ATC

## 2021-07-13 ENCOUNTER — HOSPITAL ENCOUNTER (OUTPATIENT)
Dept: MRI IMAGING | Facility: CLINIC | Age: 51
Discharge: HOME | End: 2021-07-13
Attending: PHYSICAL MEDICINE & REHABILITATION | Admitting: PHYSICAL MEDICINE & REHABILITATION
Payer: COMMERCIAL

## 2021-07-13 DIAGNOSIS — M54.12 CERVICAL RADICULOPATHY: ICD-10-CM

## 2021-07-13 DIAGNOSIS — M47.812 FACET ARTHROPATHY, CERVICAL: ICD-10-CM

## 2021-07-13 PROCEDURE — 72141 MRI NECK SPINE W/O DYE: CPT

## 2021-07-20 ENCOUNTER — OFFICE VISIT (OUTPATIENT)
Dept: ORTHOPEDICS | Facility: CLINIC | Age: 51
End: 2021-07-20
Payer: COMMERCIAL

## 2021-07-20 VITALS
BODY MASS INDEX: 33.72 KG/M2 | HEART RATE: 97 BPM | SYSTOLIC BLOOD PRESSURE: 131 MMHG | OXYGEN SATURATION: 96 % | DIASTOLIC BLOOD PRESSURE: 81 MMHG | WEIGHT: 248.6 LBS

## 2021-07-20 DIAGNOSIS — M54.12 CERVICAL RADICULOPATHY: Primary | ICD-10-CM

## 2021-07-20 PROCEDURE — 99215 OFFICE O/P EST HI 40 MIN: CPT | Performed by: PHYSICAL MEDICINE & REHABILITATION

## 2021-07-20 ASSESSMENT — PAIN SCALES - GENERAL: PAINLEVEL: NO PAIN (0)

## 2021-07-20 NOTE — NURSING NOTE
July 20, 2021 2:39 PM   Thomas Pierce is a 51 year old male who presents for:    Chief Complaint   Patient presents with     Neck - Pain     Initial Vitals: /81   Pulse 97   Wt 112.8 kg (248 lb 9.6 oz)   SpO2 96%   BMI 33.72 kg/m   Estimated body mass index is 33.72 kg/m  as calculated from the following:    Height as of 6/29/21: 1.829 m (6').    Weight as of this encounter: 112.8 kg (248 lb 9.6 oz). Body surface area is 2.39 meters squared.  No Pain (0) Comment: Data Unavailable       Clinical concerns: MRI f/u from 7/13  Jerry Villanueva, ATC

## 2021-07-20 NOTE — PROGRESS NOTES
PHYSICAL MEDICINE & REHABILITATION / MEDICAL SPINE        Date:  Jul 20, 2021    Name:  Thomas Pierce  YOB: 1970  MRN:  [id number]        CHART REVIEW:  Reviewed 06/29/2021 note from David Boyd MD (orthopedic surgery).  Mr. Thomas Pierce is a 51-year-old, right-hand-dominant, male who was being seen nearly 6 months after his left shoulder surgery.  Mr. Pierce reported that he was doing well.  His range of motion had peaked, and he had not made further progress.  Mr. Pierce was aware that strengthening the shoulder would take time.  Referral was placed to evaluate for possible left C6 radiculopathy.  Mr. Thomas Pierce was also referred for work hardening and functional capacity evaluation.     On 01/13/2021, David Boyd MD (orthopedic surgery) performed left shoulder diagnostic arthroscopy, left shoulder arthroscopic capsular release and resection, left shoulder arthroscopic revision rotator cuff repair of the supraspinatus tendon for left shoulder complete rotator cuff retear of the supraspinatus tendon, left shoulder stiffness, left shoulder subacromial bursitis.        CHIEF COMPLAINT:  Neck pain with paresthesias into the left upper extremity.        HISTORY OF PRESENT ILLNESS:  Mr. Thomas Pierce is a 51-year-old, right-hand-dominant, male.  In terms of entertainment, Mr. Thomas Pierce enjoys boating.    On 01/13/2021, David Boyd MD (orthopedic surgery) performed left shoulder diagnostic arthroscopy, left shoulder arthroscopic capsular release and resection, left shoulder arthroscopic revision rotator cuff repair of the supraspinatus tendon for left shoulder complete rotator cuff retear of the supraspinatus tendon, left shoulder stiffness, left shoulder subacromial bursitis.  Mr. Thomas Pierce works as a .  He has been off work since his left shoulder surgery on 01/13/2021.  Mr. Pierce is currently  doing work hardening.  He states he has another 5 weeks of work hardening.    Mr. Thomas Pierce had a right acromioclavicular joint separation in the past.  Approximately 1-1/2 years ago, he fell on the ice and tore his left rotator cuff.  Mr. Thomas Pierce denied any other injuries of his head, neck, upper back, shoulders, arms, elbows, forearms, wrists, hands, fingers.    Approximately 2 months ago, Mr. Thomas Pierce had insidious onset of left posterolateral neck pain radiating to the left upper trapezius, left upper extremity, and into the left first and second fingers.  Pain was more prominent initially, but now, paresthesias have become more prominent.  Pain and paresthesias are worsened by neck extension.  Pain and paresthesias are worsened with lying supine without head support.  Pain and paresthesias are improved with sitting the head in a forward flexed position.    Mr. Thomas Pierce was initially seen in this clinic on 07/09/2021.  He returns to clinic today (07/20/2021).  Mr. Thomas Pierce has not noticed any change in his symptoms since he was initially seen in this clinic.  Mr. Pierce currently rates his pain as 0/10.  His pain varies from 0/10 to a 3/10.  The paresthesias in his left upper extremity are his more dominant complaint, and these paresthesias are frequent but not constant.        ALLERGIES:  No Known Allergies      MEDICATIONS:  Current Outpatient Medications   Medication Sig Dispense Refill     aspirin 81 MG tablet Take 1 tablet by mouth daily.       blood glucose monitoring (ROSEANNE MICROLET) lancets Use to test blood sugar 1-2 times daily. 100 each 6     chlorthalidone (HYGROTON) 25 MG tablet Take 1 tablet (25 mg) by mouth daily 90 tablet 1     CONTOUR NEXT TEST test strip Use to test blood sugar 1-2 times daily. 100 strip 3     dulaglutide (TRULICITY) 0.75 MG/0.5ML pen Inject 0.75 mg Subcutaneous every 7 days 6 mL 1     lisinopril (ZESTRIL)  40 MG tablet Take 1 tablet (40 mg) by mouth daily 90 tablet 1     metFORMIN (GLUCOPHAGE-XR) 500 MG 24 hr tablet Take 4 tablets (2,000 mg) by mouth At Bedtime 360 tablet 1     rosuvastatin (CRESTOR) 20 MG tablet Take 1 tablet (20 mg) by mouth At Bedtime 90 tablet 1         PAST MEDICAL HISTORY:  Past Medical History:   Diagnosis Date     Hyperlipidemia LDL goal <160 10/20/2011     Hypertension goal BP (blood pressure) < 140/90 10/20/2011     Prediabetes          PAST SURGICAL HISTORY:  Past Surgical History:   Procedure Laterality Date     ARTHROSCOPY SHOULDER ROTATOR CUFF REPAIR Left 1/13/2021    Procedure: LEFT ARTHROSCOPY, SHOULDER, WITH REVISION ROTATOR CUFF REPAIR, SUBACROMIAL DECOMPRESSION AND CUPSULAR RELEASE;  Surgeon: David Boyd MD;  Location: [facility] OR     ARTHROSCOPY SHOULDER ROTATOR CUFF REPAIR, BICEP TENODESIS REPAIR Left 5/12/2020    Procedure: LEFT SHOULDER ARTHROSCOPIC ROTATOR CUFF REPAIR, BICEPS TENODESIS, SUBACROMIAL DECOMPRESSION,  DEBRIDEMENT;  Surgeon: David Boyd MD;  Location: [facility] OR     MANDIBLE SURGERY  1978    trauma while sledding         FAMILY HISTORY:  Family History   Problem Relation Age of Onset     Diabetes Mother         Type II     Hypertension Mother      Hyperlipidemia Mother      Cardiovascular Maternal Grandmother         PAD     Hepatitis Maternal Grandmother      Colon Polyps Cousin      Ovarian Cancer Maternal Aunt      Colon Cancer No family hx of      Prostate Cancer No family hx of      Coronary Artery Disease No family hx of      Cerebrovascular Disease No family hx of          SOCIAL HISTORY:  Social History     Socioeconomic History     Marital status:      Spouse name: Lucy     Number of children: 0     Years of education: Not on file     Highest education level: Not on file   Occupational History     Occupation:    Tobacco Use     Smoking status: Never Smoker     Smokeless tobacco: Never Used   Substance and Sexual  Activity     Alcohol use: Yes     Comment: occasionally      Drug use: No     Sexual activity: Never   Other Topics Concern     Parent/sibling w/ CABG, MI or angioplasty before 65F 55M? No   Social History Narrative     Not on file     Social Determinants of Health     Financial Resource Strain:      Difficulty of Paying Living Expenses:    Food Insecurity:      Worried About Running Out of Food in the Last Year:      Ran Out of Food in the Last Year:    Transportation Needs:      Lack of Transportation (Medical):      Lack of Transportation (Non-Medical):    Physical Activity:      Days of Exercise per Week:      Minutes of Exercise per Session:    Stress:      Feeling of Stress :    Social Connections:      Frequency of Communication with Friends and Family:      Frequency of Social Gatherings with Friends and Family:      Attends Yazidism Services:      Active Member of Clubs or Organizations:      Attends Club or Organization Meetings:      Marital Status:    Intimate Partner Violence:      Fear of Current or Ex-Partner:      Emotionally Abused:      Physically Abused:      Sexually Abused:          PHYSICAL EXAMINATION:  Vitals:    07/20/21 1439   BP: 131/81   Pulse: 97   SpO2: 96%   Weight: 112.8 kg (248 lb 9.6 oz)       GENERAL:  No acute distress.  Pleasant and cooperative.   PSYCH:  Normal mood and affect.  HEAD:  Normocephalic.  SPEECH:  No dysarthria.  EYES:  No scleral icterus.  EARS:  Hearing is intact to spoken voice.  NOSE/MOUTH:  Wearing a face mask.  LUNGS:  No respiratory distress.  No increased work of breathing.        IMAGING:  I reviewed MRI of the cervical spine from 07/13/2021.  At C2-C3, there is mild bilateral facet arthropathy.  At C3-C4, there is mild bilateral facet arthropathy, mild disc bulge asymmetric to the right.  At C4-C5, there is mild bilateral facet arthropathy.  At C5-C6, there is a broad-based disc bulge asymmetric to the left, mild bilateral facet arthropathy, uncinate  hypertrophy, mild right neuroforaminal stenosis, moderate left neuroforaminal stenosis.        ASSESSMENT/PLAN:  Mr. Thomas Pierce is a 51-year-old, right-hand-dominant, male.  He has left cervical radiculopathy (C6).  He does have some cervical facet arthropathy as well.  Discussed cervical radiculopathy, pathophysiology, treatment options with Mr. Pierce.  Discussed the options of doing nothing/living with it, physical therapy, chiropractic care, oral medications such as gabapentin and pregabalin, cervical epidural corticosteroid injection, referral to spine surgeon.  Discussed the risks and benefits of the cervical epidural corticosteroid injection.  Discussed risks of immunosuppression with corticosteroid use in light of the COVID-19 pandemic.  Mr. Thomas Pierce Like would to see how he progresses the next few weeks with work hardening for his left shoulder.  Mr. Pierce also see how the pain and paresthesias is neck and left upper extremity evolve.  Mr. Pierce would like to follow-up in this clinic in 2 months.      Total Time on encounter:  45 minutes were spent on one more or more of the following:  discussion with patient, history, exam, coordinating care, treatment goals, record review, documenting clinical information, and/or data review.      David Bueno MD

## 2021-07-20 NOTE — LETTER
7/20/2021         RE: Thomas Pierce  [address] [facility] [address]        Dear Colleague,    Thank you for referring your patient, Thomas Pierce, to the [facility] [place]. Please see a copy of my visit note below.    PHYSICAL MEDICINE & REHABILITATION / MEDICAL SPINE        Date:  Jul 20, 2021    Name:  Thomas Pierce  YOB: 1970  MRN:  [id number]        CHART REVIEW:  Reviewed 06/29/2021 note from David Boyd MD (orthopedic surgery).  Mr. Thomas Pierce is a 51-year-old, right-hand-dominant, male who was being seen nearly 6 months after his left shoulder surgery.  Mr. Pierce reported that he was doing well.  His range of motion had peaked, and he had not made further progress.  Mr. Pierce was aware that strengthening the shoulder would take time.  Referral was placed to evaluate for possible left C6 radiculopathy.  Mr. Thomas Pierce was also referred for work hardening and functional capacity evaluation.     On 01/13/2021, David Boyd MD (orthopedic surgery) performed left shoulder diagnostic arthroscopy, left shoulder arthroscopic capsular release and resection, left shoulder arthroscopic revision rotator cuff repair of the supraspinatus tendon for left shoulder complete rotator cuff retear of the supraspinatus tendon, left shoulder stiffness, left shoulder subacromial bursitis.        CHIEF COMPLAINT:  Neck pain with paresthesias into the left upper extremity.        HISTORY OF PRESENT ILLNESS:  Mr. Thomas Pierce is a 51-year-old, right-hand-dominant, male.  In terms of entertainment, Mr. Thomas Pierce enjoys boating.    On 01/13/2021, David Boyd MD (orthopedic surgery) performed left shoulder diagnostic arthroscopy, left shoulder arthroscopic capsular release and resection, left shoulder arthroscopic revision rotator cuff repair of the supraspinatus tendon for left  shoulder complete rotator cuff retear of the supraspinatus tendon, left shoulder stiffness, left shoulder subacromial bursitis.  Mr. Thomas Pierce works as a .  He has been off work since his left shoulder surgery on 01/13/2021.  Mr. Pierce is currently doing work hardening.  He states he has another 5 weeks of work hardening.    Mr. Thomas Pierce had a right acromioclavicular joint separation in the past.  Approximately 1-1/2 years ago, he fell on the ice and tore his left rotator cuff.  Mr. Thomas Pierce denied any other injuries of his head, neck, upper back, shoulders, arms, elbows, forearms, wrists, hands, fingers.    Approximately 2 months ago, Mr. Thomas Pierce had insidious onset of left posterolateral neck pain radiating to the left upper trapezius, left upper extremity, and into the left first and second fingers.  Pain was more prominent initially, but now, paresthesias have become more prominent.  Pain and paresthesias are worsened by neck extension.  Pain and paresthesias are worsened with lying supine without head support.  Pain and paresthesias are improved with sitting the head in a forward flexed position.    Mr. Thomas Pierce was initially seen in this clinic on 07/09/2021.  He returns to clinic today (07/20/2021).  Mr. Thomas Pierce has not noticed any change in his symptoms since he was initially seen in this clinic.  Mr. Pierce currently rates his pain as 0/10.  His pain varies from 0/10 to a 3/10.  The paresthesias in his left upper extremity are his more dominant complaint, and these paresthesias are frequent but not constant.        ALLERGIES:  No Known Allergies      MEDICATIONS:  Current Outpatient Medications   Medication Sig Dispense Refill     aspirin 81 MG tablet Take 1 tablet by mouth daily.       blood glucose monitoring (ROSEANNE MICROLET) lancets Use to test blood sugar 1-2 times daily. 100 each 6     chlorthalidone  (HYGROTON) 25 MG tablet Take 1 tablet (25 mg) by mouth daily 90 tablet 1     CONTOUR NEXT TEST test strip Use to test blood sugar 1-2 times daily. 100 strip 3     dulaglutide (TRULICITY) 0.75 MG/0.5ML pen Inject 0.75 mg Subcutaneous every 7 days 6 mL 1     lisinopril (ZESTRIL) 40 MG tablet Take 1 tablet (40 mg) by mouth daily 90 tablet 1     metFORMIN (GLUCOPHAGE-XR) 500 MG 24 hr tablet Take 4 tablets (2,000 mg) by mouth At Bedtime 360 tablet 1     rosuvastatin (CRESTOR) 20 MG tablet Take 1 tablet (20 mg) by mouth At Bedtime 90 tablet 1         PAST MEDICAL HISTORY:  Past Medical History:   Diagnosis Date     Hyperlipidemia LDL goal <160 10/20/2011     Hypertension goal BP (blood pressure) < 140/90 10/20/2011     Prediabetes          PAST SURGICAL HISTORY:  Past Surgical History:   Procedure Laterality Date     ARTHROSCOPY SHOULDER ROTATOR CUFF REPAIR Left 1/13/2021    Procedure: LEFT ARTHROSCOPY, SHOULDER, WITH REVISION ROTATOR CUFF REPAIR, SUBACROMIAL DECOMPRESSION AND CUPSULAR RELEASE;  Surgeon: David Boyd MD;  Location: [facility] OR     ARTHROSCOPY SHOULDER ROTATOR CUFF REPAIR, BICEP TENODESIS REPAIR Left 5/12/2020    Procedure: LEFT SHOULDER ARTHROSCOPIC ROTATOR CUFF REPAIR, BICEPS TENODESIS, SUBACROMIAL DECOMPRESSION,  DEBRIDEMENT;  Surgeon: David Boyd MD;  Location: [facility] OR     MANDIBLE SURGERY  1978    trauma while sledding         FAMILY HISTORY:  Family History   Problem Relation Age of Onset     Diabetes Mother         Type II     Hypertension Mother      Hyperlipidemia Mother      Cardiovascular Maternal Grandmother         PAD     Hepatitis Maternal Grandmother      Colon Polyps Cousin      Ovarian Cancer Maternal Aunt      Colon Cancer No family hx of      Prostate Cancer No family hx of      Coronary Artery Disease No family hx of      Cerebrovascular Disease No family hx of          SOCIAL HISTORY:  Social History     Socioeconomic History     Marital status:      Spouse name:  Lucy     Number of children: 0     Years of education: Not on file     Highest education level: Not on file   Occupational History     Occupation:    Tobacco Use     Smoking status: Never Smoker     Smokeless tobacco: Never Used   Substance and Sexual Activity     Alcohol use: Yes     Comment: occasionally      Drug use: No     Sexual activity: Never   Other Topics Concern     Parent/sibling w/ CABG, MI or angioplasty before 65F 55M? No   Social History Narrative     Not on file     Social Determinants of Health     Financial Resource Strain:      Difficulty of Paying Living Expenses:    Food Insecurity:      Worried About Running Out of Food in the Last Year:      Ran Out of Food in the Last Year:    Transportation Needs:      Lack of Transportation (Medical):      Lack of Transportation (Non-Medical):    Physical Activity:      Days of Exercise per Week:      Minutes of Exercise per Session:    Stress:      Feeling of Stress :    Social Connections:      Frequency of Communication with Friends and Family:      Frequency of Social Gatherings with Friends and Family:      Attends Christianity Services:      Active Member of Clubs or Organizations:      Attends Club or Organization Meetings:      Marital Status:    Intimate Partner Violence:      Fear of Current or Ex-Partner:      Emotionally Abused:      Physically Abused:      Sexually Abused:          PHYSICAL EXAMINATION:  Vitals:    07/20/21 1439   BP: 131/81   Pulse: 97   SpO2: 96%   Weight: 112.8 kg (248 lb 9.6 oz)       GENERAL:  No acute distress.  Pleasant and cooperative.   PSYCH:  Normal mood and affect.  HEAD:  Normocephalic.  SPEECH:  No dysarthria.  EYES:  No scleral icterus.  EARS:  Hearing is intact to spoken voice.  NOSE/MOUTH:  Wearing a face mask.  LUNGS:  No respiratory distress.  No increased work of breathing.        IMAGING:  I reviewed MRI of the cervical spine from 07/13/2021.  At C2-C3, there is mild bilateral facet  arthropathy.  At C3-C4, there is mild bilateral facet arthropathy, mild disc bulge asymmetric to the right.  At C4-C5, there is mild bilateral facet arthropathy.  At C5-C6, there is a broad-based disc bulge asymmetric to the left, mild bilateral facet arthropathy, uncinate hypertrophy, mild right neuroforaminal stenosis, moderate left neuroforaminal stenosis.        ASSESSMENT/PLAN:  Mr. Thomas Pierce is a 51-year-old, right-hand-dominant, male.  He has left cervical radiculopathy (C6).  He does have some cervical facet arthropathy as well.  Discussed cervical radiculopathy, pathophysiology, treatment options with Mr. Pierce.  Discussed the options of doing nothing/living with it, physical therapy, chiropractic care, oral medications such as gabapentin and pregabalin, cervical epidural corticosteroid injection, referral to spine surgeon.  Discussed the risks and benefits of the cervical epidural corticosteroid injection.  Discussed risks of immunosuppression with corticosteroid use in light of the COVID-19 pandemic.  Mr. Thomas Pierce Like would to see how he progresses the next few weeks with work hardening for his left shoulder.  Mr. Pierce also see how the pain and paresthesias is neck and left upper extremity evolve.  Mr. Pierce would like to follow-up in this clinic in 2 months.      Total Time on encounter:  45 minutes were spent on one more or more of the following:  discussion with patient, history, exam, coordinating care, treatment goals, record review, documenting clinical information, and/or data review.      David Bueno MD          Again, thank you for allowing me to participate in the care of your patient.        Sincerely,        David Bueno MD

## 2021-08-06 ENCOUNTER — TRANSFERRED RECORDS (OUTPATIENT)
Dept: HEALTH INFORMATION MANAGEMENT | Facility: CLINIC | Age: 51
End: 2021-08-06

## 2021-08-11 ENCOUNTER — TRANSFERRED RECORDS (OUTPATIENT)
Dept: HEALTH INFORMATION MANAGEMENT | Facility: CLINIC | Age: 51
End: 2021-08-11

## 2021-08-20 NOTE — PROGRESS NOTES
ORTHOPEDIC SURGERY POST-OPERATIVE VISIT NOTE    DATE OF SURGERY: 1/13/2021    OPERATION PERFORMED:  Left shoulder diagnostic arthroscopy, capsular release and resection, arthroscopic revision rotator cuff repair of the supraspinatus tendon    INTERVAL HISTORY: Thomas is a 51 year old, right-hand dominant male who returns today now nearly 7.5 months out from the above operation. Overall, Thomas reports that he is doing well.  He had recent consultation with Dr. Bueno regarding cervical radiculopathy but has not gone under any significant treatments at this time. Has no concerns or complaints regarding the left shoulder.  He reports his range of motion has seemed to peaked and he has not made any further progress. He he feels that he was able to push his shoulder fully with his work hardening and functional capacity evaluation. He was able to perform all of his job tasks to satisfaction.  He still notes a small amount of difficulty with lifting objects up and away from the body with the left arm alone. Overall he is pleased with his outcome.     REVIEW OF SYSTEMS:  General: Denies fevers, chills, or night sweats.  Skin: Denies rashes or lesions.  Head: Denies headache or dizziness.  Eyes: Denies vision changes or eye pain.  Ears: Denies ear pain or decreased hearing.  Nose: Denies nose bleeding or sinus pain.  Mouth & Throat: Denies bleeding gums or sore throat.  Neck: Denies neck pain or stiffness.  Respiratory: Denies cough, wheezing, sob, or hemoptysis.  Cardiovascular: Denies chest pain, chest pressure, or palpitations.   Gastrointestinal: Denies abdominal pain, nausea, vomiting, diarrhea, or constipation.  Genitourinary: Denies difficulty or pain with urination.   Musculoskeletal: As noted above in the HPI, otherwise normal.  Neuro: Denies paralysis, weakness, paresthesias, or speech difficulty.  Lymphatics: Denies lymphadenopathy.  Psych: Denies anxiety, sadness, or  irritability.    EXAM:  Constitutional: Well-developed, well-nourished, healthy appearing male.  Skin: Warm, dry, and without rashes.   Cardiac: Well perfused extremities, strong 2+ peripheral pulses. No edema.   Pulmonary: Non-labored respirations on room air without audible wheezes.   Musculoskeletal: Focused examination of the left shoulder demonstrates seated active forward elevation to 160 degrees, seated external rotation in abduction to 80 degrees, seated active external rotation at the side to 60 degrees, internal rotation of the back to T10.  Supine active forward elevation 170, external rotation in abduction to 85, internal rotation in abduction to 50 degrees.  Normal belly press, and strong external rotation at the side with improving strength in scaption and forward elevation to 5 out of 5.  Neurovascular exam is intact.    IMAGING:   No new imaging obtained.  Previous imaging reviewed.    IMPRESSION: 51 year old-year-old right hand dominant male now 7.5 months status-post the above. Overall doing well.    PLAN:   I had a nice discussion with Thomas today.     He has completed his functional capacity evaluation and his work hardening.  They have laid out final restrictions for him.  I agree with these.  I have signed off on these as his final restrictions moving forward.  I am okay with him returning to work with these restrictions in place permanently.  He has now reached maximal medical improvement following his left shoulder revision arthroscopic rotator cuff repair, capsular release, and manipulation under anesthesia.    He may return on an as-needed basis moving forward.    At the conclusion of the office visit, Thomas verbally acknowledged that I answered all of his questions satisfactorily.

## 2021-08-23 ENCOUNTER — OFFICE VISIT (OUTPATIENT)
Dept: ORTHOPEDICS | Facility: CLINIC | Age: 51
End: 2021-08-23
Payer: OTHER MISCELLANEOUS

## 2021-08-23 VITALS
SYSTOLIC BLOOD PRESSURE: 126 MMHG | HEIGHT: 72 IN | BODY MASS INDEX: 33.59 KG/M2 | DIASTOLIC BLOOD PRESSURE: 84 MMHG | WEIGHT: 248 LBS

## 2021-08-23 DIAGNOSIS — Z98.890 S/P ARTHROSCOPY OF LEFT SHOULDER: Primary | ICD-10-CM

## 2021-08-23 PROCEDURE — 99213 OFFICE O/P EST LOW 20 MIN: CPT | Performed by: ORTHOPAEDIC SURGERY

## 2021-08-23 ASSESSMENT — MIFFLIN-ST. JEOR: SCORE: 2007.92

## 2021-08-23 NOTE — PATIENT INSTRUCTIONS
Workability letter updated today.   MMI reached.     Follow up as needed.     Call my office with any questions or concerns, [phone].

## 2021-08-23 NOTE — LETTER
8/23/2021         RE: Thomas Pierce  [address] [facility] [address]        Dear Colleague,    Thank you for referring your patient, Thomas Pierce, to the [facility] [place]. Please see a copy of my visit note below.    ORTHOPEDIC SURGERY POST-OPERATIVE VISIT NOTE    DATE OF SURGERY: 1/13/2021    OPERATION PERFORMED:  Left shoulder diagnostic arthroscopy, capsular release and resection, arthroscopic revision rotator cuff repair of the supraspinatus tendon    INTERVAL HISTORY: Thomas is a 51 year old, right-hand dominant male who returns today now nearly 7.5 months out from the above operation. Overall, Thomas reports that he is doing well.  He had recent consultation with Dr. Bueno regarding cervical radiculopathy but has not gone under any significant treatments at this time. Has no concerns or complaints regarding the left shoulder.  He reports his range of motion has seemed to peaked and he has not made any further progress. He he feels that he was able to push his shoulder fully with his work hardening and functional capacity evaluation. He was able to perform all of his job tasks to satisfaction.  He still notes a small amount of difficulty with lifting objects up and away from the body with the left arm alone. Overall he is pleased with his outcome.     REVIEW OF SYSTEMS:  General: Denies fevers, chills, or night sweats.  Skin: Denies rashes or lesions.  Head: Denies headache or dizziness.  Eyes: Denies vision changes or eye pain.  Ears: Denies ear pain or decreased hearing.  Nose: Denies nose bleeding or sinus pain.  Mouth & Throat: Denies bleeding gums or sore throat.  Neck: Denies neck pain or stiffness.  Respiratory: Denies cough, wheezing, sob, or hemoptysis.  Cardiovascular: Denies chest pain, chest pressure, or palpitations.   Gastrointestinal: Denies abdominal pain, nausea, vomiting, diarrhea, or constipation.  Genitourinary: Denies  difficulty or pain with urination.   Musculoskeletal: As noted above in the HPI, otherwise normal.  Neuro: Denies paralysis, weakness, paresthesias, or speech difficulty.  Lymphatics: Denies lymphadenopathy.  Psych: Denies anxiety, sadness, or irritability.    EXAM:  Constitutional: Well-developed, well-nourished, healthy appearing male.  Skin: Warm, dry, and without rashes.   Cardiac: Well perfused extremities, strong 2+ peripheral pulses. No edema.   Pulmonary: Non-labored respirations on room air without audible wheezes.   Musculoskeletal: Focused examination of the left shoulder demonstrates seated active forward elevation to 160 degrees, seated external rotation in abduction to 80 degrees, seated active external rotation at the side to 60 degrees, internal rotation of the back to T10.  Supine active forward elevation 170, external rotation in abduction to 85, internal rotation in abduction to 50 degrees.  Normal belly press, and strong external rotation at the side with improving strength in scaption and forward elevation to 5 out of 5.  Neurovascular exam is intact.    IMAGING:   No new imaging obtained.  Previous imaging reviewed.    IMPRESSION: 51 year old-year-old right hand dominant male now 7.5 months status-post the above. Overall doing well.    PLAN:   I had a nice discussion with Thomas today.     He has completed his functional capacity evaluation and his work hardening.  They have laid out final restrictions for him.  I agree with these.  I have signed off on these as his final restrictions moving forward.  I am okay with him returning to work with these restrictions in place permanently.  He has now reached maximal medical improvement following his left shoulder revision arthroscopic rotator cuff repair, capsular release, and manipulation under anesthesia.    He may return on an as-needed basis moving forward.    At the conclusion of the office visit, Thomas verbally acknowledged that I  answered all of his questions satisfactorily.      Again, thank you for allowing me to participate in the care of your patient.        Sincerely,        David Boyd MD

## 2021-08-23 NOTE — LETTER
August 23, 2021      Thomas Pierce  [address] [facility] [address]        To Whom It May Concern:    Thomas Pierce was seen in our clinic. He may return to work with the following: limited to light duty - lifting, pushing or pulling no greater than 20 pounds with left arm away from the body. No lifting greater than 5 pounds overhead.  He is to follow up in 6 weeks for updated work restrictions.      Sincerely,        David Boyd MD

## 2021-09-05 ENCOUNTER — HEALTH MAINTENANCE LETTER (OUTPATIENT)
Age: 51
End: 2021-09-05

## 2021-10-26 ENCOUNTER — TELEPHONE (OUTPATIENT)
Dept: ORTHOPEDICS | Facility: CLINIC | Age: 51
End: 2021-10-26
Payer: COMMERCIAL

## 2021-10-26 NOTE — TELEPHONE ENCOUNTER
Reason for Call:  Form, our goal is to have forms completed with 7 days, however, some forms may require a visit or additional information.    Type of letter, form or note:  work comp     Who is the form from?: Insurance    Where did the form come from: form was faxed in    Phone number of person requesting form: [phone], [phone]  Can we leave a detailed message on this number:  NO      How will form be returned?:  fax to [phone]    Has the patient signed a consent form for release of information (may be included with form)? Not Applicable    Additional comments: LOV on 8/23/21 with Dr. Boyd, MMI and workability updated.     Form was started and place in accordion folder in [facility] for provider review/ completion at [facility].

## 2021-11-03 NOTE — TELEPHONE ENCOUNTER
Forms started.   Need  Provider review for PPD.   MMI reached on 8/23/21 per Dr. Boyd's last office visit note.     Please advise.     Dena Mayer ATC

## 2021-11-08 NOTE — TELEPHONE ENCOUNTER
Form is asking for Permanent disability as well, I am unclear if MMI also determines PPD as well.     Thomas Redd PA-C  [facility] - Surgery

## 2021-11-11 NOTE — TELEPHONE ENCOUNTER
HCP reports completed and left with triage.    Thomas Redd PA-C  [facility] - Surgery

## 2021-11-11 NOTE — TELEPHONE ENCOUNTER
The note by Deb on 8/23/21 has a good documentation of ROM.  Please refer to the W/C injury book for assessment of the  permanent disability ratiing which will be based on ROM of the shoulder. My guess is ZERO %.

## 2021-11-12 NOTE — TELEPHONE ENCOUNTER
Form completed and faxed to provided number.   Fax confirmed via RightFax.    Form sent to scan.   Copy of form placed in accordion folder.     Dena Mayer ATC

## 2021-12-26 ENCOUNTER — HEALTH MAINTENANCE LETTER (OUTPATIENT)
Age: 51
End: 2021-12-26

## 2022-01-12 ENCOUNTER — OFFICE VISIT (OUTPATIENT)
Dept: FAMILY MEDICINE | Facility: CLINIC | Age: 52
End: 2022-01-12
Payer: COMMERCIAL

## 2022-01-12 VITALS
WEIGHT: 246 LBS | HEART RATE: 91 BPM | RESPIRATION RATE: 14 BRPM | SYSTOLIC BLOOD PRESSURE: 102 MMHG | HEIGHT: 72 IN | TEMPERATURE: 97.7 F | BODY MASS INDEX: 33.32 KG/M2 | DIASTOLIC BLOOD PRESSURE: 60 MMHG | OXYGEN SATURATION: 98 %

## 2022-01-12 DIAGNOSIS — M25.562 CHRONIC PAIN OF LEFT KNEE: Primary | ICD-10-CM

## 2022-01-12 DIAGNOSIS — G89.29 CHRONIC PAIN OF LEFT KNEE: Primary | ICD-10-CM

## 2022-01-12 DIAGNOSIS — Z23 HIGH PRIORITY FOR 2019-NCOV VACCINE: ICD-10-CM

## 2022-01-12 DIAGNOSIS — Z23 NEED FOR INFLUENZA VACCINATION: ICD-10-CM

## 2022-01-12 DIAGNOSIS — E11.9 TYPE 2 DIABETES MELLITUS WITHOUT COMPLICATION, WITHOUT LONG-TERM CURRENT USE OF INSULIN (H): ICD-10-CM

## 2022-01-12 LAB — HBA1C MFR BLD: 7.2 % (ref 0–5.6)

## 2022-01-12 PROCEDURE — 99214 OFFICE O/P EST MOD 30 MIN: CPT | Mod: 25 | Performed by: PHYSICIAN ASSISTANT

## 2022-01-12 PROCEDURE — 83036 HEMOGLOBIN GLYCOSYLATED A1C: CPT | Performed by: PHYSICIAN ASSISTANT

## 2022-01-12 PROCEDURE — 36415 COLL VENOUS BLD VENIPUNCTURE: CPT | Performed by: PHYSICIAN ASSISTANT

## 2022-01-12 PROCEDURE — 82043 UR ALBUMIN QUANTITATIVE: CPT | Performed by: PHYSICIAN ASSISTANT

## 2022-01-12 PROCEDURE — 0054A COVID-19,PF,PFIZER (12+ YRS): CPT | Performed by: PHYSICIAN ASSISTANT

## 2022-01-12 PROCEDURE — 80061 LIPID PANEL: CPT | Performed by: PHYSICIAN ASSISTANT

## 2022-01-12 PROCEDURE — 90471 IMMUNIZATION ADMIN: CPT | Performed by: PHYSICIAN ASSISTANT

## 2022-01-12 PROCEDURE — 90682 RIV4 VACC RECOMBINANT DNA IM: CPT | Performed by: PHYSICIAN ASSISTANT

## 2022-01-12 PROCEDURE — 91305 COVID-19,PF,PFIZER (12+ YRS): CPT | Performed by: PHYSICIAN ASSISTANT

## 2022-01-12 ASSESSMENT — MIFFLIN-ST. JEOR: SCORE: 1998.85

## 2022-01-12 ASSESSMENT — PAIN SCALES - GENERAL: PAINLEVEL: NO PAIN (0)

## 2022-01-12 NOTE — PROGRESS NOTES
"  Assessment & Plan     Thomas was seen today for musculoskeletal problem and imm/inj.    Diagnoses and all orders for this visit:    Chronic pain of left knee  -     Possible Baker's cyst with underlying arthritic origin vs less likley meniscal tear. Xray considered, but unavailable today. Advised consult with orthopedics for further evaluation/definitive management.  - Orthopedic  Referral; Future    Type 2 diabetes mellitus without complication, without long-term current use of insulin (H)  -     Admits to getting off track with taking meds and not checking BS consistently. Repeat labs while here today and encouraged to resume previous regimen. F/u 3 months if uncontrolled to encourage maintenance of routine.  - Lipid panel reflex to direct LDL Fasting; Future  -     Hemoglobin A1c; Future  -     Albumin Random Urine Quantitative with Creat Ratio; Future  -     Lipid panel reflex to direct LDL Fasting  -     Hemoglobin A1c  -     Albumin Random Urine Quantitative with Creat Ratio    High priority for 2019-nCoV vaccine  -     COVID-19,PF,PFIZER (12+ Yrs GRAY LABEL)    Need for influenza vaccination  -     ID C INFLU VACC, SPLIT VIRUS, IM > 3 YO (FLUZONE)        Return in about 2 weeks (around 1/26/2022) for ortho consult.    Leyla Underwood PA-C  [facility] [place]    Belkis Romero is a 51 year old who presents for the following health issues:    Type 2 DM - admits fell off track and was only taking meds sporadically. Over past few weeks has been more consistent and back on meds. Not checking BS much - admits last he checked was a few weeks back and was in the low 200's. Believes when on trulicity was the \"mid 100's\". Previously met with DM education and felt he learned how to better check his BS.   Was 140-150 2 hour post prandial  120-130 fasting in AM  Last a1c was 6.0 in May  Had insurance change      Musculoskeletal Problem    History of Present Illness       He eats " "2-3 servings of fruits and vegetables daily.He exercises with enough effort to increase his heart rate 60 or more minutes per day.  He exercises with enough effort to increase his heart rate 3 or less days per week. He is missing 2 dose(s) of medications per week.  He is not taking prescribed medications regularly due to remembering to take.       Knee Pain    Onset: 4 month(s) ago     Description:   Left knee  Location: changes - sometimes is anterior (front), entire knee and medial (in the middle) that radiates to proximal shin.  Severity waxes and wanes depending on the day.  Tried different braces to see if helps - on that puts pressure below kneecap seems to help most.  One-day stepped out of truck with full knee extension and misjudged the distance and had sharp pain that lingered all day.  No current pain at rest now - debated coming in as has days where it's better.  No locking, but periodic feeling of catching - doesn't really hurt.   Will still flare up if bumps or twists it the wrong way.  Overall feels \"it's been going away and feeling good\" so stopped using brace and has been ok.  Character: Sharp  Any injury: no    Accompanying Signs and Symptoms:         Swelling: not that he's noticed, but has stiffness        Popping: YES        Locking: no        Does knee feel like it is going to give out: no        Redness or warmth: no    History:          Any previous injury to knee(s): no    Therapies tried and outcome: brace with moderate relief    Iced periodically, but didn't notice a different     Avoids OTC meds as \"I don't like to take it if I need it\".    Sochx: camilla - gets in/out of truck all day      Current Outpatient Medications   Medication     aspirin 81 MG tablet     blood glucose monitoring (ROSEANNE MICROLET) lancets     chlorthalidone (HYGROTON) 25 MG tablet     CONTOUR NEXT TEST test strip     dulaglutide (TRULICITY) 0.75 MG/0.5ML pen     lisinopril (ZESTRIL) 40 MG tablet     metFORMIN " (GLUCOPHAGE-XR) 500 MG 24 hr tablet     rosuvastatin (CRESTOR) 20 MG tablet     No current facility-administered medications for this visit.      No Known Allergies      Review of Systems   Constitutional, HEENT, cardiovascular, pulmonary, endocrine, MSK, skin, neuro systems are negative, except as otherwise noted.      Objective    /60   Pulse 91   Temp 97.7  F (36.5  C) (Tympanic)   Resp 14   Ht 1.829 m (6')   Wt 111.6 kg (246 lb)   SpO2 98%   BMI 33.36 kg/m    Body mass index is 33.36 kg/m .  Physical Exam   GENERAL: healthy, alert and no distress  EYES: Eyes grossly normal to inspection, PERRL and conjunctivae and sclerae normal  MS: ambulates without antalgic gait. Able to perform deep knee squat. No TTP to patella, distal femoral condyles, tibial plateau, or lateral joint line. Perhaps some discomfort to medial L knee, but difficulty localizing exact location. Maybe minimal swelling noted medially as well. Popliteal cyst-like bulge is noted suggestive of baker's cyst. Negative anterior drawer, patellar grind, Nathan and Thessaly testing.  SKIN: no redness, warmth or rash of L knee.  NEURO: normal 2+ DTRs of LE.

## 2022-01-13 LAB
CHOLEST SERPL-MCNC: 207 MG/DL
FASTING STATUS PATIENT QL REPORTED: YES
HDLC SERPL-MCNC: 66 MG/DL
LDLC SERPL CALC-MCNC: 125 MG/DL
NONHDLC SERPL-MCNC: 141 MG/DL
TRIGL SERPL-MCNC: 79 MG/DL

## 2022-01-14 LAB
CREAT UR-MCNC: 200 MG/DL
MICROALBUMIN UR-MCNC: 52 MG/L
MICROALBUMIN/CREAT UR: 26 MG/G CR (ref 0–17)

## 2022-01-17 NOTE — RESULT ENCOUNTER NOTE
Dear Nick,      Your recent test results are noted below:    -LDL(bad) cholesterol level is elevated which can increase your heart disease risk.  A diet high in fat and simple carbohydrates, genetics and being overweight can contribute to this. ADVISE: resuming your crestor/rosuvastatin and ensure you're taking this consistently as it had previously been within normal limits. We can always re-assess once back on this regularly.  -A1C test (average blood sugar the last 2-3 months) is above your goal as expected. Please resume previous regimen with hopes of getting this back in good control again. Recheck in 3 months as previously recommended.  -Microalbumin (urine protein) level is elevated. This is suggestive of early damage to your kidneys from high blood pressure and diabetes.  ADVISE: avoiding anti-inflamatory agents such as ibuprofen (Advil, Motrin) or naproxen (Aleve) as much as possible, keeping your blood pressure in a normal range, and continuing your medication (lisinopril) that helps protect your kidneys.  Also, this should be rechecked in 1 year.     For additional lab test information, [email or web address] is an excellent reference. Please contact the clinic at [phone] with any further questions or concerns.    Sincerely,      Leyla Underwood PA-C  [facility]

## 2022-01-19 ENCOUNTER — ANCILLARY PROCEDURE (OUTPATIENT)
Dept: GENERAL RADIOLOGY | Facility: CLINIC | Age: 52
End: 2022-01-19
Attending: FAMILY MEDICINE
Payer: COMMERCIAL

## 2022-01-19 ENCOUNTER — OFFICE VISIT (OUTPATIENT)
Dept: ORTHOPEDICS | Facility: CLINIC | Age: 52
End: 2022-01-19
Attending: PHYSICIAN ASSISTANT
Payer: COMMERCIAL

## 2022-01-19 VITALS
SYSTOLIC BLOOD PRESSURE: 108 MMHG | WEIGHT: 246 LBS | BODY MASS INDEX: 33.32 KG/M2 | HEIGHT: 72 IN | DIASTOLIC BLOOD PRESSURE: 70 MMHG

## 2022-01-19 DIAGNOSIS — M25.562 CHRONIC PAIN OF LEFT KNEE: ICD-10-CM

## 2022-01-19 DIAGNOSIS — M17.12 PRIMARY OSTEOARTHRITIS OF LEFT KNEE: Primary | ICD-10-CM

## 2022-01-19 DIAGNOSIS — G89.29 CHRONIC PAIN OF LEFT KNEE: ICD-10-CM

## 2022-01-19 PROCEDURE — 99214 OFFICE O/P EST MOD 30 MIN: CPT | Performed by: FAMILY MEDICINE

## 2022-01-19 PROCEDURE — 73562 X-RAY EXAM OF KNEE 3: CPT | Mod: LT | Performed by: FAMILY MEDICINE

## 2022-01-19 ASSESSMENT — MIFFLIN-ST. JEOR: SCORE: 1998.85

## 2022-01-19 NOTE — LETTER
1/19/2022         RE: Thomas Pierce  [address] [address]        Dear Colleague,    Thank you for referring your patient, Thomas Pierce, to the [facility] [place]. Please see a copy of my visit note below.    ASSESSMENT & PLAN  Patient Instructions     1. Primary osteoarthritis of left knee    2. Chronic pain of left knee      -Patient has chronic left knee pain and swelling due to mild arthritis  -Patient will start Voltaren gel as needed for pain and swelling.  Patient may take oral ibuprofen or Tylenol as needed sparingly for more significant breakthrough pain  -Patient will start home exercise program.  Handouts were given today  -Patient will follow up if pain or swelling worsen  -Call direct clinic number [[phone]] at any time with questions or concerns.    Albert Yeo MD [facility]  [facility]          -----    SUBJECTIVE  Thomas Pierce is a/an 51 year old male who is seen in consultation at the request of  Leyla Underwood PA-C for evaluation of left knee pain. The patient is seen by themselves.    Onset: 4 month(s) ago. Reports insidious onset without acute precipitating event.  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  Full sensation in posterior knee over past ~ 2- 3 weeks.  Location of Pain: left knee medial joint line, posterior knee with radiation to medial lower leg  Rating of Pain at worst: 7/10  Rating of Pain Currently: 1/10  Worsened by: climbing into truck, knee bends, squatting  Better with: rest, knee brace  Treatments tried: rest/activity avoidance, ice, ibuprofen and casting/splinting/bracing  Associated symptoms: swelling and joint stiffness  Orthopedic history: NO  Relevant surgical history: NO  Social history: social history: works as a     Past Medical History:   Diagnosis Date     Hyperlipidemia LDL goal <160  10/20/2011     Hypertension goal BP (blood pressure) < 140/90 10/20/2011     Prediabetes      Social History     Socioeconomic History     Marital status:      Spouse name: Lucy     Number of children: 0     Years of education: Not on file     Highest education level: Not on file   Occupational History     Occupation:    Tobacco Use     Smoking status: Never Smoker     Smokeless tobacco: Never Used   Substance and Sexual Activity     Alcohol use: Yes     Comment: occasionally      Drug use: No     Sexual activity: Never   Other Topics Concern     Parent/sibling w/ CABG, MI or angioplasty before 65F 55M? No   Social History Narrative     Not on file     Social Determinants of Health     Financial Resource Strain: Not on file   Food Insecurity: Not on file   Transportation Needs: Not on file   Physical Activity: Not on file   Stress: Not on file   Social Connections: Not on file   Intimate Partner Violence: Not on file   Housing Stability: Not on file         Patient's past medical, surgical, social, and family histories were reviewed today and no changes are noted.    REVIEW OF SYSTEMS:  10 point ROS is negative other than symptoms noted above in HPI, Past Medical History or as stated below  Constitutional: NEGATIVE for fever, chills, change in weight  Skin: NEGATIVE for worrisome rashes, moles or lesions  GI/: NEGATIVE for bowel or bladder changes  Neuro: NEGATIVE for weakness, dizziness or paresthesias    OBJECTIVE:  /70   Ht 1.829 m (6')   Wt 111.6 kg (246 lb)   BMI 33.36 kg/m     General: healthy, alert and in no distress  HEENT: no scleral icterus or conjunctival erythema  Skin: no suspicious lesions or rash. No jaundice.  CV: no pedal edema  Resp: normal respiratory effort without conversational dyspnea   Psych: normal mood and affect  Gait: normal steady gait with appropriate coordination and balance  Neuro: Normal light sensory exam of lower extremity  MSK:  LEFT  KNEE  Inspection:    normal alignment  Palpation:    Tender about the medial joint line. Remainder of bony and ligamentous landmarks are nontender.    Trace effusion is present    Patellofemoral crepitus is Absent  Range of Motion:     00 extension to 1100 flexion  Strength:    Quadriceps grossly intact    Extensor mechanism intact  Special Tests:    Positive: none    Negative: MCL/valgus stress (0 & 30 deg), LCL/varus stress (0 & 30 deg), Lachman's, anterior drawer, posterior drawer, Nathan's    Independent visualization of the below image:  Recent Results (from the past 24 hour(s))   XR Knee Standing AP Bilat [facility] Bilat Lat Left    Narrative    Mild medial compartment joint space narrowing.  Small osteophytes in the   medial and patellofemoral compartments.  No acute fracture or dislocation.         Albert Yeo MD [facility]        Again, thank you for allowing me to participate in the care of your patient.        Sincerely,        Albert Yeo, MD

## 2022-01-19 NOTE — PATIENT INSTRUCTIONS
1. Primary osteoarthritis of left knee    2. Chronic pain of left knee      -Patient has chronic left knee pain and swelling due to mild arthritis  -Patient will start Voltaren gel as needed for pain and swelling.  Patient may take oral ibuprofen or Tylenol as needed sparingly for more significant breakthrough pain  -Patient will start home exercise program.  Handouts were given today  -Patient will follow up if pain or swelling worsen  -Call direct clinic number [[phone]] at any time with questions or concerns.    Albert Yeo MD CAM  [facility]  [facility] [place]

## 2022-02-20 ENCOUNTER — HEALTH MAINTENANCE LETTER (OUTPATIENT)
Age: 52
End: 2022-02-20

## 2022-04-27 ENCOUNTER — OFFICE VISIT (OUTPATIENT)
Dept: FAMILY MEDICINE | Facility: CLINIC | Age: 52
End: 2022-04-27
Payer: COMMERCIAL

## 2022-04-27 DIAGNOSIS — E11.9 TYPE 2 DIABETES MELLITUS WITHOUT COMPLICATION, WITHOUT LONG-TERM CURRENT USE OF INSULIN (H): Primary | ICD-10-CM

## 2022-04-27 DIAGNOSIS — I10 HYPERTENSION GOAL BP (BLOOD PRESSURE) < 140/90: ICD-10-CM

## 2022-04-27 DIAGNOSIS — Z23 NEED FOR TETANUS BOOSTER: ICD-10-CM

## 2022-04-27 DIAGNOSIS — E78.5 HYPERLIPIDEMIA LDL GOAL <100: ICD-10-CM

## 2022-04-27 DIAGNOSIS — Z12.5 SCREENING FOR PROSTATE CANCER: ICD-10-CM

## 2022-04-27 LAB — HBA1C MFR BLD: 5.9 % (ref 0–5.6)

## 2022-04-27 PROCEDURE — 80053 COMPREHEN METABOLIC PANEL: CPT | Performed by: PHYSICIAN ASSISTANT

## 2022-04-27 PROCEDURE — 90715 TDAP VACCINE 7 YRS/> IM: CPT | Performed by: PHYSICIAN ASSISTANT

## 2022-04-27 PROCEDURE — G0103 PSA SCREENING: HCPCS | Performed by: PHYSICIAN ASSISTANT

## 2022-04-27 PROCEDURE — 90471 IMMUNIZATION ADMIN: CPT | Performed by: PHYSICIAN ASSISTANT

## 2022-04-27 PROCEDURE — 99214 OFFICE O/P EST MOD 30 MIN: CPT | Mod: 25 | Performed by: PHYSICIAN ASSISTANT

## 2022-04-27 PROCEDURE — 80061 LIPID PANEL: CPT | Performed by: PHYSICIAN ASSISTANT

## 2022-04-27 PROCEDURE — 83036 HEMOGLOBIN GLYCOSYLATED A1C: CPT | Performed by: PHYSICIAN ASSISTANT

## 2022-04-27 PROCEDURE — 99207 PR FOOT EXAM NO CHARGE: CPT | Performed by: PHYSICIAN ASSISTANT

## 2022-04-27 PROCEDURE — 36415 COLL VENOUS BLD VENIPUNCTURE: CPT | Performed by: PHYSICIAN ASSISTANT

## 2022-04-27 RX ORDER — METFORMIN HCL 500 MG
2000 TABLET, EXTENDED RELEASE 24 HR ORAL AT BEDTIME
Qty: 360 TABLET | Refills: 1 | Status: SHIPPED | OUTPATIENT
Start: 2022-04-27 | End: 2023-02-13

## 2022-04-27 RX ORDER — LISINOPRIL 40 MG/1
40 TABLET ORAL DAILY
Qty: 90 TABLET | Refills: 1 | Status: SHIPPED | OUTPATIENT
Start: 2022-04-27 | End: 2023-02-13

## 2022-04-27 RX ORDER — DULAGLUTIDE 0.75 MG/.5ML
0.75 INJECTION, SOLUTION SUBCUTANEOUS
Qty: 6 ML | Refills: 1 | Status: SHIPPED | OUTPATIENT
Start: 2022-04-27 | End: 2022-10-21

## 2022-04-27 RX ORDER — CHLORTHALIDONE 25 MG/1
25 TABLET ORAL DAILY
Qty: 90 TABLET | Refills: 1 | Status: SHIPPED | OUTPATIENT
Start: 2022-04-27 | End: 2023-02-13

## 2022-04-27 NOTE — PROGRESS NOTES
"  Assessment & Plan     Type 2 diabetes mellitus without complication, without long-term current use of insulin (H)  - Patient continues to not monitoring BS consistently. Reports only took a few times since January and they were in the low 200s. Unclear about context of readings. States he hasn't been taking medications regularly for past two weeks, although had been taking prior to this following last visit. Will recheck A1c and refill medications, however, may consider medication changes should A1c be elevated. Patient admitted \"atomic diet\" high in sugars past two weeks. Encouraged resuming previous regimen, focus on healthy diet (decreased carbs, sugars, salt). Patient examination, including diabetic foot exam, unremarkable. Patient still hasn't seen optometry and is agreeable to a referral today. F/u in 6 months for preventative health visit/DM recheck if a1c at goal.  - REVIEW OF HEALTH MAINTENANCE PROTOCOL ORDERS  - OPTOMETRY REFERRAL  - dulaglutide (TRULICITY) 0.75 MG/0.5ML pen  Dispense: 6 mL; Refill: 1  - metFORMIN (GLUCOPHAGE-XR) 500 MG 24 hr tablet  Dispense: 360 tablet; Refill: 1  - Hemoglobin A1c  - FOOT EXAM    Hypertension goal BP (blood pressure) < 140/90  - Patient states hasn't been taking home blood pressures. 131/88 upon recheck today. States hasn't been taking his hypertensive medications for past two weeks, per above. Encouraged resume previous regimen, focus on healthy diet low in sodium. CMP for recheck of renal function. F/u in 6 months for preventative health visit.   - chlorthalidone (HYGROTON) 25 MG tablet  Dispense: 90 tablet; Refill: 1  - Comprehensive metabolic panel (BMP + Alb, Alk Phos, ALT, AST, Total. Bili, TP)  - lisinopril (ZESTRIL) 40 MG tablet  Dispense: 90 tablet; Refill: 1    Hyperlipidemia LDL goal <100  - Patient reports not taking rosuvastatin past two weeks. Encouraged to resume previous regimen given no medication side effects.   - Lipid panel reflex to direct LDL " Fasting    Need for tetanus booster  - TDAP VACCINE (Adacel, Boostrix)    Screening for prostate cancer  - PSA, screen    BMI:   Estimated body mass index is 33.09 kg/m  as calculated from the following:    Height as of this encounter: 1.829 m (6').    Weight as of this encounter: 110.7 kg (244 lb).   Weight management plan: Discussed healthy diet and exercise guidelines    Return in about 6 months (around 10/27/2022) for Preventive Visit, chronic condition recheck.    This patient was seen alongside a student physician assistant, EFE Escobar. The history, physical exam, review of systems, objective data and assessment/plan were completed by myself.  Leyla Underwood PA-C  [facility]    Belkis Romero is a 52 year old who presents for the following health issues:    History of Present Illness       Diabetes:   He presents for follow up of diabetes.  He is not checking blood glucose. He has no concerns regarding his diabetes at this time.  He is not experiencing numbness or burning in feet, excessive thirst, blurry vision, weight changes or redness, sores or blisters on feet. The patient has not had a diabetic eye exam in the last 12 months.         Hypertension: He presents for follow up of hypertension.  He does not check blood pressure  regularly outside of the clinic. Outpatient blood pressures have not been over 140/90. He does not follow a low salt diet.     He eats 0-1 servings of fruits and vegetables daily.He consumes 0 sweetened beverage(s) daily.He exercises with enough effort to increase his heart rate 30 to 60 minutes per day.  He exercises with enough effort to increase his heart rate 5 days per week. He is missing 2 dose(s) of medications per week.  He is not taking prescribed medications regularly due to remembering to take.     Hyperlipidemia Follow-Up      Are you regularly taking any medication or supplement to lower your cholesterol?   Yes , Crestor  "    Are you having muscle aches or other side effects that you think could be caused by your cholesterol lowering medication?  No    Per patient, still not taking blood sugars regularly. He vaguely recalls taking a few readings and believe they were in the low 200s, however, is unsure of the context as to when and whether or not he was fasting at the time of the readings. For two of the past three months, patient states he was \"pretty good\" about taking his medications as prescribed. He states over the past two weeks, he's not had time to set his medications up for the week and as a result hasn't been regularly taking his Metformin and Trulicity. He also states he has gone \"atomic\" in regards to his diet and that his has been high in sugars. Denies significant excess salt intake although admits it could be improved. He denies drinking alcohol. Endorses urinary frequency, but states it's per his baseline as he's had this for a few years. Denies recent history of associated symptoms of unintentional weight loss, vision changes, numbness/tingling of upper or lower extremities, dizziness, lightheadedness, abdominal pain, excessive thirst, nausea or vomiting.     Patient also denies monitoring home blood pressures. Denies recent history of chest pain, palpitations, worsening/new edema of his lower extremities.     Patient reports he hasn't yet scheduled eye examination. He's still deciding where he would like to go to complete examination.     In regards to hyperlipidemia, patient reports he has experienced no side effects from his crestor.    Current Outpatient Medications   Medication     aspirin 81 MG tablet     blood glucose monitoring (ROSEANNE MICROLET) lancets     chlorthalidone (HYGROTON) 25 MG tablet     CONTOUR NEXT TEST test strip     diclofenac (VOLTAREN) 1 % topical gel     dulaglutide (TRULICITY) 0.75 MG/0.5ML pen     lisinopril (ZESTRIL) 40 MG tablet     metFORMIN (GLUCOPHAGE-XR) 500 MG 24 hr tablet     " rosuvastatin (CRESTOR) 20 MG tablet     No current facility-administered medications for this visit.      No Known Allergies    Review of Systems   Constitutional, HEENT, cardiovascular, pulmonary, gi and gu, neuro, skin systems are negative, except as otherwise noted.      Objective    /88   Pulse 78   Temp 97.8  F (36.6  C) (Tympanic)   Resp 16   Ht 1.829 m (6')   Wt 110.7 kg (244 lb)   SpO2 97%   BMI 33.09 kg/m    Body mass index is 33.09 kg/m .  Physical Exam   GENERAL: healthy, alert and no distress  EYES: Eyes grossly normal to inspection, PERRL and conjunctivae and sclerae normal  RESP: lungs clear to auscultation - no rales, rhonchi or wheezes  CV: regular rates and rhythm, normal S1 S2, no S3 or S4, no murmur, click or rub, peripheral pulses strong and no peripheral edema  Diabetic foot exam: normal DP and PT pulses, no trophic changes or ulcerative lesions, normal sensory exam and normal monofilament exam    Results for orders placed or performed in visit on 04/27/22   Comprehensive metabolic panel (BMP + Alb, Alk Phos, ALT, AST, Total. Bili, TP)     Status: Normal   Result Value Ref Range    Sodium 138 133 - 144 mmol/L    Potassium 4.1 3.4 - 5.3 mmol/L    Chloride 105 94 - 109 mmol/L    Carbon Dioxide (CO2) 23 20 - 32 mmol/L    Anion Gap 10 3 - 14 mmol/L    Urea Nitrogen 13 7 - 30 mg/dL    Creatinine 0.77 0.66 - 1.25 mg/dL    Calcium 9.5 8.5 - 10.1 mg/dL    Glucose 85 70 - 99 mg/dL    Alkaline Phosphatase 63 40 - 150 U/L    AST 20 0 - 45 U/L    ALT 26 0 - 70 U/L    Protein Total 7.7 6.8 - 8.8 g/dL    Albumin 4.3 3.4 - 5.0 g/dL    Bilirubin Total 0.7 0.2 - 1.3 mg/dL    GFR Estimate >90 >60 mL/min/1.73m2   Hemoglobin A1c     Status: Abnormal   Result Value Ref Range    Hemoglobin A1C 5.9 (H) 0.0 - 5.6 %   Lipid panel reflex to direct LDL Fasting     Status: Abnormal   Result Value Ref Range    Cholesterol 250 (H) <200 mg/dL    Triglycerides 74 <150 mg/dL    Direct Measure HDL 60 >=40 mg/dL     LDL Cholesterol Calculated 175 (H) <=100 mg/dL    Non HDL Cholesterol 190 (H) <130 mg/dL    Patient Fasting > 8hrs? Yes     Narrative    Cholesterol  Desirable:  <200 mg/dL    Triglycerides  Normal:  Less than 150 mg/dL  Borderline High:  150-199 mg/dL  High:  200-499 mg/dL  Very High:  Greater than or equal to 500 mg/dL    Direct Measure HDL  Female:  Greater than or equal to 50 mg/dL   Male:  Greater than or equal to 40 mg/dL    LDL Cholesterol  Desirable:  <100mg/dL  Above Desirable:  100-129 mg/dL   Borderline High:  130-159 mg/dL   High:  160-189 mg/dL   Very High:  >= 190 mg/dL    Non HDL Cholesterol  Desirable:  130 mg/dL  Above Desirable:  130-159 mg/dL  Borderline High:  160-189 mg/dL  High:  190-219 mg/dL  Very High:  Greater than or equal to 220 mg/dL   PSA, screen     Status: Normal   Result Value Ref Range    Prostate Specific Antigen Screen 0.67 0.00 - 4.00 ug/L

## 2022-04-28 LAB
ALBUMIN SERPL-MCNC: 4.3 G/DL (ref 3.4–5)
ALP SERPL-CCNC: 63 U/L (ref 40–150)
ALT SERPL W P-5'-P-CCNC: 26 U/L (ref 0–70)
ANION GAP SERPL CALCULATED.3IONS-SCNC: 10 MMOL/L (ref 3–14)
AST SERPL W P-5'-P-CCNC: 20 U/L (ref 0–45)
BILIRUB SERPL-MCNC: 0.7 MG/DL (ref 0.2–1.3)
BUN SERPL-MCNC: 13 MG/DL (ref 7–30)
CALCIUM SERPL-MCNC: 9.5 MG/DL (ref 8.5–10.1)
CHLORIDE BLD-SCNC: 105 MMOL/L (ref 94–109)
CHOLEST SERPL-MCNC: 250 MG/DL
CO2 SERPL-SCNC: 23 MMOL/L (ref 20–32)
CREAT SERPL-MCNC: 0.77 MG/DL (ref 0.66–1.25)
FASTING STATUS PATIENT QL REPORTED: YES
GFR SERPL CREATININE-BSD FRML MDRD: >90 ML/MIN/1.73M2
GLUCOSE BLD-MCNC: 85 MG/DL (ref 70–99)
HDLC SERPL-MCNC: 60 MG/DL
LDLC SERPL CALC-MCNC: 175 MG/DL
NONHDLC SERPL-MCNC: 190 MG/DL
POTASSIUM BLD-SCNC: 4.1 MMOL/L (ref 3.4–5.3)
PROT SERPL-MCNC: 7.7 G/DL (ref 6.8–8.8)
PSA SERPL-MCNC: 0.67 UG/L (ref 0–4)
SODIUM SERPL-SCNC: 138 MMOL/L (ref 133–144)
TRIGL SERPL-MCNC: 74 MG/DL

## 2022-04-29 VITALS
HEART RATE: 78 BPM | DIASTOLIC BLOOD PRESSURE: 88 MMHG | RESPIRATION RATE: 16 BRPM | BODY MASS INDEX: 33.05 KG/M2 | SYSTOLIC BLOOD PRESSURE: 131 MMHG | HEIGHT: 72 IN | WEIGHT: 244 LBS | OXYGEN SATURATION: 97 % | TEMPERATURE: 97.8 F

## 2022-05-02 NOTE — RESULT ENCOUNTER NOTE
Dear Nick,      Your recent test results are noted below:    -PSA (prostate specific antigen) test is normal.  This indicates a low likelihood of prostate cancer.  ADVISE: rechecking this in 1 year.  -LDL(bad) cholesterol level is elevated which can increase your heart disease risk. This appears worse though from when checked 3 months ago. Can you verify if you've been taking your crestor consistently? A diet high in fat and simple carbohydrates, genetics and being overweight can contribute to this. ADVISE: exercising 150 minutes of aerobic exercise per week (30 minutes for 5 days per week or 50 minutes for 3 days per week are options) and eating a low saturated fat/low carbohydrate diet are helpful to improve this. If not taking your med consistently, please start to do so and we can repeat this again with your blood work at your physical.   -Liver and gallbladder tests are normal (ALT,AST, Alk phos, bilirubin), kidney function is normal (Cr, GFR), sodium is normal, potassium is normal, calcium is normal, glucose is normal.  -A1C (test of diabetes control the last 2-3 months) is at your goal and significantly improved from previous with better medication compliance :) Please continue with your current plan. Also, you should make an appointment to see me and recheck your A1C test in 6 months.     For additional lab test information, [email or web address] is an excellent reference. Please contact the clinic at [phone] with any further questions or concerns.    Sincerely,      Leyla Underwood PA-C  [facility]

## 2022-06-28 ENCOUNTER — MYC MEDICAL ADVICE (OUTPATIENT)
Dept: FAMILY MEDICINE | Facility: CLINIC | Age: 52
End: 2022-06-28

## 2022-06-28 DIAGNOSIS — E11.9 TYPE 2 DIABETES MELLITUS WITHOUT COMPLICATION, WITHOUT LONG-TERM CURRENT USE OF INSULIN (H): Primary | ICD-10-CM

## 2022-06-28 NOTE — TELEPHONE ENCOUNTER
LOV 4/27/2022    Please see my chart message below     Please review and advise     Thank you     Giselle Bruce RN, BSN  [place] [facility]

## 2022-10-20 DIAGNOSIS — E11.9 TYPE 2 DIABETES MELLITUS WITHOUT COMPLICATION, WITHOUT LONG-TERM CURRENT USE OF INSULIN (H): ICD-10-CM

## 2022-10-20 NOTE — LETTER
[address] [address]  [phone]            November 1, 2022    Thomas Pierce                                                                                                                                                       [address] [place] [id number]              Dear Thomas,    This letter is to inform you that a follow up appointment is needed to discuss your medication.  Please call to schedule an appointment for a Diabetic Medication Check at [phone] to discuss. We look forward to serving you with any medical needs or questions.      Sincerely,          Jasmyn Giles PA-C / TR

## 2022-10-21 RX ORDER — DULAGLUTIDE 0.75 MG/.5ML
INJECTION, SOLUTION SUBCUTANEOUS
Qty: 6 ML | Refills: 0 | Status: SHIPPED | OUTPATIENT
Start: 2022-10-21 | End: 2023-02-13

## 2022-10-21 NOTE — TELEPHONE ENCOUNTER
Prescription approved per [facility] Refill Protocol.  Gabriella PALOMARES pt needs DM check     Team please call to schedule     Chandrika Ellison RN

## 2022-10-23 ENCOUNTER — HEALTH MAINTENANCE LETTER (OUTPATIENT)
Age: 52
End: 2022-10-23

## 2022-12-10 ENCOUNTER — HEALTH MAINTENANCE LETTER (OUTPATIENT)
Age: 52
End: 2022-12-10

## 2023-02-13 ENCOUNTER — OFFICE VISIT (OUTPATIENT)
Dept: FAMILY MEDICINE | Facility: CLINIC | Age: 53
End: 2023-02-13
Payer: COMMERCIAL

## 2023-02-13 VITALS
OXYGEN SATURATION: 96 % | HEIGHT: 72 IN | HEART RATE: 91 BPM | BODY MASS INDEX: 36.16 KG/M2 | TEMPERATURE: 97.2 F | SYSTOLIC BLOOD PRESSURE: 120 MMHG | WEIGHT: 267 LBS | DIASTOLIC BLOOD PRESSURE: 81 MMHG

## 2023-02-13 DIAGNOSIS — E66.01 MORBID OBESITY (H): ICD-10-CM

## 2023-02-13 DIAGNOSIS — Z13.0 SCREENING FOR DEFICIENCY ANEMIA: ICD-10-CM

## 2023-02-13 DIAGNOSIS — Z12.11 SCREEN FOR COLON CANCER: ICD-10-CM

## 2023-02-13 DIAGNOSIS — E78.5 HYPERLIPIDEMIA LDL GOAL <100: ICD-10-CM

## 2023-02-13 DIAGNOSIS — I10 HTN, GOAL BELOW 130/80: ICD-10-CM

## 2023-02-13 DIAGNOSIS — E11.9 TYPE 2 DIABETES MELLITUS WITHOUT COMPLICATION, WITHOUT LONG-TERM CURRENT USE OF INSULIN (H): ICD-10-CM

## 2023-02-13 DIAGNOSIS — Z00.00 ROUTINE GENERAL MEDICAL EXAMINATION AT A HEALTH CARE FACILITY: Primary | ICD-10-CM

## 2023-02-13 DIAGNOSIS — Z13.29 SCREENING FOR THYROID DISORDER: ICD-10-CM

## 2023-02-13 DIAGNOSIS — Z12.5 SCREENING FOR PROSTATE CANCER: ICD-10-CM

## 2023-02-13 LAB
ERYTHROCYTE [DISTWIDTH] IN BLOOD BY AUTOMATED COUNT: 11.8 % (ref 10–15)
HBA1C MFR BLD: 8.6 % (ref 0–5.6)
HCT VFR BLD AUTO: 43.8 % (ref 40–53)
HGB BLD-MCNC: 15.9 G/DL (ref 13.3–17.7)
MCH RBC QN AUTO: 30 PG (ref 26.5–33)
MCHC RBC AUTO-ENTMCNC: 36.3 G/DL (ref 31.5–36.5)
MCV RBC AUTO: 83 FL (ref 78–100)
PLATELET # BLD AUTO: 276 10E3/UL (ref 150–450)
RBC # BLD AUTO: 5.3 10E6/UL (ref 4.4–5.9)
WBC # BLD AUTO: 8.9 10E3/UL (ref 4–11)

## 2023-02-13 PROCEDURE — 99214 OFFICE O/P EST MOD 30 MIN: CPT | Mod: 25 | Performed by: NURSE PRACTITIONER

## 2023-02-13 PROCEDURE — G0103 PSA SCREENING: HCPCS | Performed by: NURSE PRACTITIONER

## 2023-02-13 PROCEDURE — 36415 COLL VENOUS BLD VENIPUNCTURE: CPT | Performed by: NURSE PRACTITIONER

## 2023-02-13 PROCEDURE — 99396 PREV VISIT EST AGE 40-64: CPT | Performed by: NURSE PRACTITIONER

## 2023-02-13 PROCEDURE — 80061 LIPID PANEL: CPT | Performed by: NURSE PRACTITIONER

## 2023-02-13 PROCEDURE — 99207 PR FOOT EXAM NO CHARGE: CPT | Mod: 25 | Performed by: NURSE PRACTITIONER

## 2023-02-13 PROCEDURE — 84443 ASSAY THYROID STIM HORMONE: CPT | Performed by: NURSE PRACTITIONER

## 2023-02-13 PROCEDURE — 82570 ASSAY OF URINE CREATININE: CPT | Performed by: NURSE PRACTITIONER

## 2023-02-13 PROCEDURE — 80053 COMPREHEN METABOLIC PANEL: CPT | Performed by: NURSE PRACTITIONER

## 2023-02-13 PROCEDURE — 85027 COMPLETE CBC AUTOMATED: CPT | Performed by: NURSE PRACTITIONER

## 2023-02-13 PROCEDURE — 82043 UR ALBUMIN QUANTITATIVE: CPT | Performed by: NURSE PRACTITIONER

## 2023-02-13 PROCEDURE — 83036 HEMOGLOBIN GLYCOSYLATED A1C: CPT | Performed by: NURSE PRACTITIONER

## 2023-02-13 PROCEDURE — 82607 VITAMIN B-12: CPT | Performed by: NURSE PRACTITIONER

## 2023-02-13 RX ORDER — METFORMIN HCL 500 MG
2000 TABLET, EXTENDED RELEASE 24 HR ORAL AT BEDTIME
Qty: 360 TABLET | Refills: 3 | Status: SHIPPED | OUTPATIENT
Start: 2023-02-13 | End: 2024-04-15

## 2023-02-13 RX ORDER — ROSUVASTATIN CALCIUM 20 MG/1
20 TABLET, COATED ORAL AT BEDTIME
Qty: 90 TABLET | Refills: 3 | Status: SHIPPED | OUTPATIENT
Start: 2023-02-13 | End: 2023-02-27 | Stop reason: DRUGHIGH

## 2023-02-13 RX ORDER — CHLORTHALIDONE 25 MG/1
25 TABLET ORAL DAILY
Qty: 90 TABLET | Refills: 3 | Status: SHIPPED | OUTPATIENT
Start: 2023-02-13 | End: 2024-04-15

## 2023-02-13 RX ORDER — DULAGLUTIDE 0.75 MG/.5ML
0.75 INJECTION, SOLUTION SUBCUTANEOUS
Qty: 6 ML | Refills: 3 | Status: SHIPPED | OUTPATIENT
Start: 2023-02-13 | End: 2024-04-15

## 2023-02-13 RX ORDER — LISINOPRIL 40 MG/1
40 TABLET ORAL DAILY
Qty: 90 TABLET | Refills: 3 | Status: SHIPPED | OUTPATIENT
Start: 2023-02-13 | End: 2024-04-15 | Stop reason: DRUGHIGH

## 2023-02-14 LAB
ALBUMIN SERPL BCG-MCNC: 4.6 G/DL (ref 3.5–5.2)
ALP SERPL-CCNC: 69 U/L (ref 40–129)
ALT SERPL W P-5'-P-CCNC: 37 U/L (ref 10–50)
ANION GAP SERPL CALCULATED.3IONS-SCNC: 16 MMOL/L (ref 7–15)
AST SERPL W P-5'-P-CCNC: 32 U/L (ref 10–50)
BILIRUB SERPL-MCNC: 0.6 MG/DL
BUN SERPL-MCNC: 12.1 MG/DL (ref 6–20)
CALCIUM SERPL-MCNC: 9.8 MG/DL (ref 8.6–10)
CHLORIDE SERPL-SCNC: 99 MMOL/L (ref 98–107)
CHOLEST SERPL-MCNC: 241 MG/DL
CREAT SERPL-MCNC: 0.78 MG/DL (ref 0.67–1.17)
CREAT UR-MCNC: 22.8 MG/DL
DEPRECATED HCO3 PLAS-SCNC: 21 MMOL/L (ref 22–29)
GFR SERPL CREATININE-BSD FRML MDRD: >90 ML/MIN/1.73M2
GLUCOSE SERPL-MCNC: 120 MG/DL (ref 70–99)
HDLC SERPL-MCNC: 56 MG/DL
LDLC SERPL CALC-MCNC: 147 MG/DL
MICROALBUMIN UR-MCNC: <12 MG/L
MICROALBUMIN/CREAT UR: NORMAL MG/G{CREAT}
NONHDLC SERPL-MCNC: 185 MG/DL
POTASSIUM SERPL-SCNC: 3.7 MMOL/L (ref 3.4–5.3)
PROT SERPL-MCNC: 7.4 G/DL (ref 6.4–8.3)
PSA SERPL-MCNC: 0.95 NG/ML (ref 0–3.5)
SODIUM SERPL-SCNC: 136 MMOL/L (ref 136–145)
TRIGL SERPL-MCNC: 192 MG/DL
TSH SERPL DL<=0.005 MIU/L-ACNC: 2.67 UIU/ML (ref 0.3–4.2)
VIT B12 SERPL-MCNC: 348 PG/ML (ref 232–1245)

## 2023-02-27 RX ORDER — ROSUVASTATIN CALCIUM 40 MG/1
40 TABLET, COATED ORAL DAILY
Qty: 90 TABLET | Refills: 3 | Status: SHIPPED | OUTPATIENT
Start: 2023-02-27 | End: 2024-04-15

## 2023-02-27 NOTE — RESULT ENCOUNTER NOTE
Note to Staff: please call the patient to explain results.    LDL(bad) cholesterol level is elevated, and your triglycerides are elevated which can increase your heart disease risk especially with diabetes.  A diet high in fat and simple carbohydrates, genetics and being overweight can contribute to this. ADVISE: Better control of blood sugar activity goal of weight loss and continue statin medication.  I have increased his cholesterol medicine to Crestor 40 mg daily.  This new prescription has been sent to pharmacy.  Please discontinue the 20 mg dosing or take 2 of them daily until current bottle is used up before picking up new refill.  In 3 months, he needs to recheck fasting cholesterol panel and liver function by scheduling a lab-only appointment.    A1C test (average blood sugar the last 2-3 months) is above your goal.   ADVISE: making a diabetic followup appointment with diabetic education.  They will make recommendations for changes in medications to help get back on track. Please check and record your blood sugars at least 2 times daily for 1 week prior to your appointment and bring for review.  Also, you should recheck your A1C in 3 months.     For additional lab test information, [email or web address].org is an excellent reference.      Julia Boland, ANTONINO-BC

## 2023-03-10 NOTE — PROGRESS NOTES
Injectable Influenza Immunization Documentation    1.  Is the person to be vaccinated sick today?   No    2. Does the person to be vaccinated have an allergy to a component   of the vaccine?   No  Egg Allergy Algorithm Link    3. Has the person to be vaccinated ever had a serious reaction   to influenza vaccine in the past?   No    4. Has the person to be vaccinated ever had Guillain-Barré syndrome?   No    Form completed by Jennifer Mosqueda MA              Topical Retinoid counseling:  Patient advised to apply a pea-sized amount only at bedtime and wait 30 minutes after washing their face before applying.  If too drying, patient may add a non-comedogenic moisturizer. The patient verbalized understanding of the proper use and possible adverse effects of retinoids.  All of the patient's questions and concerns were addressed.

## 2023-03-21 ENCOUNTER — TELEPHONE (OUTPATIENT)
Dept: GASTROENTEROLOGY | Facility: CLINIC | Age: 53
End: 2023-03-21
Payer: COMMERCIAL

## 2023-03-21 NOTE — TELEPHONE ENCOUNTER
Screening Questions  BLUE  KIND OF PREP RED  LOCATION [review exclusion criteria] GREEN  SEDATION TYPE        Y Are you active on mychart?       BELIA GILBERT Ordering/Referring Provider?        BCBS What type of coverage do you have?      N Have you had a positive covid test in the last 14 days?     35.3 1. BMI  [BMI 40+ - review exclusion criteria]    Y  2. Are you able to give consent for your medical care? [IF NO,RN REVIEW]          N  3. Are you taking any prescription pain medications on a routine schedule   (ex narcotics: oxycodone, roxicodone, oxycontin,  and percocet)? [RN Review]          3a. EXTENDED PREP What kind of prescription?     N 4. Do you have any chemical dependencies such as alcohol, street drugs, or methadone?        **If yes 3- 5 , please schedule with MAC sedation.**          IF YES TO ANY 6 - 10 - HOSPITAL SETTING ONLY.     N 6.   Do you need assistance transferring?     N 7.   Have you had a heart or lung transplant?    N 8.   Are you currently on dialysis?   N 9.   Do you use daily home oxygen?   N 10. Do you take nitroglycerin?   10a.  If yes, how often?     11. [FEMALES]  NA Are you currently pregnant?    11a.  If yes, how many weeks? [ Greater than 12 weeks, OR NEEDED]    N 12. Do you have Pulmonary Hypertension? *NEED PAC APPT AT [facility] w/ MAC*     N 13. [review exclusion criteria]  Do you have any implantable devices in your body (pacemaker, defib, LVAD)?    N 14. In the past 6 months, have you had any heart related issues including cardiomyopathy or heart attack?     14a.  If yes, did it require cardiac stenting if so when?     N 15. Have you had a stroke or Transient ischemic attack (TIA - aka  mini stroke ) within 6 months?      N 16. Do you have mod to severe Obstructive Sleep Apnea?  [Hospital only]    N 17. Do you have SEVERE AND UNCONTROLLED asthma? *NEED PAC APPT AT [facility] w/MAC*     18. Are you currently taking any blood thinners?     18a. No. Continue to 19.   18b. Yes/no  "Blood Thinner: No [CONTINUE TO #19]    N 19. Do you take the medication Phentermine?    19a. If yes, \"Hold for 7 days before procedure.  Please consult your prescribing provider if you have questions about holding this medication.\"     N  20. Do you have chronic kidney disease?      Y  21. Do you have a diagnosis of diabetes?     N  22. On a regular basis do you go 3-5 days between bowel movements?      23. Preferred LOCAL Pharmacy for Pre Prescription    [ LIST ONLY ONE PHARMACY]     [id number] - [place], [place] - [address] AT [facility] [address]        - CLOSING REMINDERS -    Informed patient they will need an adult    Cannot take any type of public or medical transportation alone    Conscious Sedation- Needs  for 6 hours after the procedure       MAC/General-Needs  for 24 hours after procedure    Pre-Procedure Covid test to be completed [[facility] PCR Testing Required]    Confirmed Nurse will call to complete assessment       - SCHEDULING DETAILS -  N Hospital Setting Required? If yes, what is the exclusion?:    ANA  Surgeon    06/19/2023  Date of Procedure  Lower Endoscopy [Colonoscopy]  Type of Procedure Scheduled  [facility] Location   STANDARD [facility]-If you answer yes to questions #8, #20, #21Which Colonoscopy Prep was Sent?     CS Sedation Type     N PAC / Pre-op Required               "

## 2023-05-15 ENCOUNTER — OFFICE VISIT (OUTPATIENT)
Dept: OPTOMETRY | Facility: CLINIC | Age: 53
End: 2023-05-15
Attending: NURSE PRACTITIONER
Payer: COMMERCIAL

## 2023-05-15 DIAGNOSIS — E11.9 TYPE 2 DIABETES MELLITUS WITHOUT COMPLICATION, WITHOUT LONG-TERM CURRENT USE OF INSULIN (H): ICD-10-CM

## 2023-05-15 DIAGNOSIS — H52.4 PRESBYOPIA: ICD-10-CM

## 2023-05-15 DIAGNOSIS — E11.9 TYPE 2 DIABETES MELLITUS WITHOUT RETINOPATHY (H): Primary | ICD-10-CM

## 2023-05-15 PROCEDURE — 92004 COMPRE OPH EXAM NEW PT 1/>: CPT | Performed by: OPTOMETRIST

## 2023-05-15 PROCEDURE — 92015 DETERMINE REFRACTIVE STATE: CPT | Performed by: OPTOMETRIST

## 2023-05-15 ASSESSMENT — EXTERNAL EXAM - LEFT EYE: OS_EXAM: NORMAL

## 2023-05-15 ASSESSMENT — REFRACTION_MANIFEST
OS_SPHERE: +0.25
OS_CYLINDER: +0.50
OD_CYLINDER: SPHERE
OS_ADD: +2.50
OD_SPHERE: +0.50
METHOD_AUTOREFRACTION: 1
OD_ADD: +2.50
OD_CYLINDER: SPHERE
OS_AXIS: 025
OD_SPHERE: +0.25
OS_AXIS: 070
OS_CYLINDER: +0.25
OS_SPHERE: +0.25

## 2023-05-15 ASSESSMENT — KERATOMETRY
OS_AXISANGLE2_DEGREES: 131
OS_AXISANGLE_DEGREES: 41
OS_K2POWER_DIOPTERS: 44.50
OS_K1POWER_DIOPTERS: 43.12
OD_AXISANGLE_DEGREES: 90
OD_AXISANGLE2_DEGREES: 180
OD_K2POWER_DIOPTERS: 43.87
OD_K1POWER_DIOPTERS: 43

## 2023-05-15 ASSESSMENT — VISUAL ACUITY
OS_SC+: -2
OD_SC: 20/20
OS_SC: 20/80
METHOD: SNELLEN - LINEAR
OD_SC: 20/80
OS_SC: 20/20

## 2023-05-15 ASSESSMENT — TONOMETRY
OD_IOP_MMHG: 15
OS_IOP_MMHG: 15
IOP_METHOD: APPLANATION

## 2023-05-15 ASSESSMENT — SLIT LAMP EXAM - LIDS: COMMENTS: NORMAL

## 2023-05-15 ASSESSMENT — CONF VISUAL FIELD
OS_NORMAL: 1
OS_INFERIOR_TEMPORAL_RESTRICTION: 0
OD_SUPERIOR_NASAL_RESTRICTION: 0
METHOD: COUNTING FINGERS
OD_INFERIOR_NASAL_RESTRICTION: 0
OS_INFERIOR_NASAL_RESTRICTION: 0
OD_INFERIOR_TEMPORAL_RESTRICTION: 0
OD_SUPERIOR_TEMPORAL_RESTRICTION: 0
OS_SUPERIOR_NASAL_RESTRICTION: 0
OS_SUPERIOR_TEMPORAL_RESTRICTION: 0
OD_NORMAL: 1

## 2023-05-15 ASSESSMENT — CUP TO DISC RATIO
OD_RATIO: 0.3
OS_RATIO: 0.3

## 2023-05-15 ASSESSMENT — EXTERNAL EXAM - RIGHT EYE: OD_EXAM: NORMAL

## 2023-05-15 NOTE — PROGRESS NOTES
Chief Complaint   Patient presents with     Diabetic Eye Exam     Lab Results   Component Value Date    A1C 8.6 02/13/2023    A1C 5.9 04/27/2022    A1C 7.2 01/12/2022    A1C 6.0 05/06/2021    A1C 8.6 01/07/2021    A1C 8.3 05/07/2020    A1C 6.4 11/04/2019    A1C 6.1 01/29/2019            Last Eye Exam: 3-4 years ago  Dilated Previously: Yes, side effects of dilation explained today    What are you currently using to see?  readers    Distance Vision Acuity: Satisfied with vision unaided     Near Vision Acuity: Satisfied with vision while reading and using computer with readers    Eye Comfort: good  Do you use eye drops? : No      Nicole Hogan - Optometric Assistant      Medical, surgical and family histories reviewed and updated 5/15/2023.       OBJECTIVE: See Ophthalmology exam    ASSESSMENT:    ICD-10-CM    1. Type 2 diabetes mellitus without retinopathy (H)  E11.9       2. Type 2 diabetes mellitus without complication, without long-term current use of insulin (H)  E11.9 Adult Eye  Referral      3. Presbyopia  H52.4           PLAN:  Discussed spec options   Glucose control  Thomas lord  eye exam results will be sent to Jasmyn Giles.    Sara Tinoco OD

## 2023-05-15 NOTE — PATIENT INSTRUCTIONS
Patient Education   Diabetes weakens the blood vessels all over the body, including the eyes. Damage to the blood vessels in the eyes can cause swelling or bleeding into part of the eye (called the retina). This is called diabetic retinopathy (KARISSA-tin-AH-puh-thee). If not treated, this disease can cause vision loss or blindness.   Symptoms may include blurred or distorted vision, but many people have no symptoms. It's important to see your eye doctor regularly to check for problems.   Early treatment and good control can help protect your vision. Here are the things you can do to help prevent vision loss:      1. Keep your blood sugar levels under tight control.      2. Bring high blood pressure under control.      3. No smoking.      4. Have yearly dilated eye exams.  Normal exam today

## 2023-05-15 NOTE — LETTER
5/15/2023         RE: Thomas Pierce  [address] [facility] [address]        Dear Colleague,    Thank you for referring your patient, Thomas Pierce, to the [facility]. Please see a copy of my visit note below.    Chief Complaint   Patient presents with     Diabetic Eye Exam     Lab Results   Component Value Date    A1C 8.6 02/13/2023    A1C 5.9 04/27/2022    A1C 7.2 01/12/2022    A1C 6.0 05/06/2021    A1C 8.6 01/07/2021    A1C 8.3 05/07/2020    A1C 6.4 11/04/2019    A1C 6.1 01/29/2019            Last Eye Exam: 3-4 years ago  Dilated Previously: Yes, side effects of dilation explained today    What are you currently using to see?  readers    Distance Vision Acuity: Satisfied with vision unaided     Near Vision Acuity: Satisfied with vision while reading and using computer with readers    Eye Comfort: good  Do you use eye drops? : No      Nicole Hogan - Optometric Assistant      Medical, surgical and family histories reviewed and updated 5/15/2023.       OBJECTIVE: See Ophthalmology exam    ASSESSMENT:    ICD-10-CM    1. Type 2 diabetes mellitus without retinopathy (H)  E11.9       2. Type 2 diabetes mellitus without complication, without long-term current use of insulin (H)  E11.9 Adult Eye  Referral      3. Presbyopia  H52.4           PLAN:  Discussed spec options   Glucose control  Thomas Pierce aware  eye exam results will be sent to Jasmyn Giles.    Sara Tinoco OD           Again, thank you for allowing me to participate in the care of your patient.        Sincerely,        Sara Tinoco, OD

## 2023-05-27 ENCOUNTER — LAB (OUTPATIENT)
Dept: LAB | Facility: CLINIC | Age: 53
End: 2023-05-27
Payer: COMMERCIAL

## 2023-05-27 DIAGNOSIS — E78.5 HYPERLIPIDEMIA LDL GOAL <100: ICD-10-CM

## 2023-05-27 DIAGNOSIS — E11.9 TYPE 2 DIABETES MELLITUS WITHOUT COMPLICATION, WITHOUT LONG-TERM CURRENT USE OF INSULIN (H): ICD-10-CM

## 2023-05-27 DIAGNOSIS — I10 HTN, GOAL BELOW 130/80: ICD-10-CM

## 2023-05-27 DIAGNOSIS — E66.01 MORBID OBESITY (H): ICD-10-CM

## 2023-05-27 LAB
ALBUMIN SERPL BCG-MCNC: 4.8 G/DL (ref 3.5–5.2)
ALP SERPL-CCNC: 64 U/L (ref 40–129)
ALT SERPL W P-5'-P-CCNC: 53 U/L (ref 10–50)
ANION GAP SERPL CALCULATED.3IONS-SCNC: 13 MMOL/L (ref 7–15)
AST SERPL W P-5'-P-CCNC: 38 U/L (ref 10–50)
BILIRUB SERPL-MCNC: 0.3 MG/DL
BUN SERPL-MCNC: 20.3 MG/DL (ref 6–20)
CALCIUM SERPL-MCNC: 9.6 MG/DL (ref 8.6–10)
CHLORIDE SERPL-SCNC: 100 MMOL/L (ref 98–107)
CHOLEST SERPL-MCNC: 161 MG/DL
CK SERPL-CCNC: 207 U/L (ref 39–308)
CREAT SERPL-MCNC: 0.95 MG/DL (ref 0.67–1.17)
DEPRECATED HCO3 PLAS-SCNC: 23 MMOL/L (ref 22–29)
GFR SERPL CREATININE-BSD FRML MDRD: >90 ML/MIN/1.73M2
GLUCOSE SERPL-MCNC: 151 MG/DL (ref 70–99)
HBA1C MFR BLD: 7.9 % (ref 0–5.6)
HDLC SERPL-MCNC: 47 MG/DL
LDLC SERPL CALC-MCNC: 96 MG/DL
NONHDLC SERPL-MCNC: 114 MG/DL
POTASSIUM SERPL-SCNC: 4.2 MMOL/L (ref 3.4–5.3)
PROT SERPL-MCNC: 7.3 G/DL (ref 6.4–8.3)
SODIUM SERPL-SCNC: 136 MMOL/L (ref 136–145)
TRIGL SERPL-MCNC: 89 MG/DL

## 2023-05-27 PROCEDURE — 36415 COLL VENOUS BLD VENIPUNCTURE: CPT

## 2023-05-27 PROCEDURE — 80053 COMPREHEN METABOLIC PANEL: CPT

## 2023-05-27 PROCEDURE — 82550 ASSAY OF CK (CPK): CPT

## 2023-05-27 PROCEDURE — 83036 HEMOGLOBIN GLYCOSYLATED A1C: CPT

## 2023-05-27 PROCEDURE — 80061 LIPID PANEL: CPT

## 2023-05-30 RX ORDER — BISACODYL 5 MG/1
TABLET, DELAYED RELEASE ORAL
Qty: 4 TABLET | Refills: 0 | Status: SHIPPED | OUTPATIENT
Start: 2023-05-30 | End: 2024-04-15

## 2023-06-05 DIAGNOSIS — E11.9 TYPE 2 DIABETES MELLITUS WITHOUT COMPLICATION, WITHOUT LONG-TERM CURRENT USE OF INSULIN (H): Primary | ICD-10-CM

## 2023-06-05 NOTE — RESULT ENCOUNTER NOTE
Dear Nick,    Here is a summary of your recent test results:    -Cholesterol levels are at your goal levels.  ADVISE: continuing your medication, a regular exercise program with at least 150 minutes of aerobic exercise per week, and eating a low saturated fat/low carbohydrate diet.  Also, you should recheck this fasting cholesterol panel in 12 months.  -A1C (test of diabetes control the last 2-3 months) is close to your goal. Please recheck your A1C test in 3 months.     For additional lab test information, [email or web address].org is an excellent reference.    In addition, here is a list of due or overdue Health Maintenance reminders:    Discuss Advance Care Planning Never done  Colorectal Cancer Screening Never done  ANNUAL REVIEW OF HM ORDERS due on 04/27/2023    Please call us at [phone] (or use [facility]) to address the above recommendations if needed.    Thank you for choosing [facility].  It was an honor and a privilege to participate in your care.       Healthy regards,    Julia Boland, ANTONINO  [facility]

## 2023-06-19 ENCOUNTER — HOSPITAL ENCOUNTER (OUTPATIENT)
Facility: CLINIC | Age: 53
Discharge: HOME | End: 2023-06-19
Attending: INTERNAL MEDICINE | Admitting: INTERNAL MEDICINE
Payer: COMMERCIAL

## 2023-06-19 VITALS
DIASTOLIC BLOOD PRESSURE: 69 MMHG | OXYGEN SATURATION: 95 % | RESPIRATION RATE: 16 BRPM | HEART RATE: 88 BPM | SYSTOLIC BLOOD PRESSURE: 106 MMHG

## 2023-06-19 DIAGNOSIS — Z12.11 SCREENING FOR MALIGNANT NEOPLASM OF COLON: Primary | ICD-10-CM

## 2023-06-19 LAB — COLONOSCOPY: NORMAL

## 2023-06-19 PROCEDURE — 250N000011 HC RX IP 250 OP 636: Performed by: INTERNAL MEDICINE

## 2023-06-19 PROCEDURE — G0500 MOD SEDAT ENDO SERVICE >5YRS: HCPCS | Mod: PT | Performed by: INTERNAL MEDICINE

## 2023-06-19 PROCEDURE — 45380 COLONOSCOPY AND BIOPSY: CPT | Performed by: INTERNAL MEDICINE

## 2023-06-19 PROCEDURE — 88305 TISSUE EXAM BY PATHOLOGIST: CPT | Mod: 26 | Performed by: PATHOLOGY

## 2023-06-19 PROCEDURE — 45385 COLONOSCOPY W/LESION REMOVAL: CPT | Mod: PT | Performed by: INTERNAL MEDICINE

## 2023-06-19 PROCEDURE — 88305 TISSUE EXAM BY PATHOLOGIST: CPT | Mod: TC | Performed by: INTERNAL MEDICINE

## 2023-06-19 RX ORDER — LIDOCAINE 40 MG/G
CREAM TOPICAL
Status: DISCONTINUED | OUTPATIENT
Start: 2023-06-19 | End: 2023-06-19 | Stop reason: HOSPADM

## 2023-06-19 RX ORDER — ONDANSETRON 2 MG/ML
4 INJECTION INTRAMUSCULAR; INTRAVENOUS EVERY 6 HOURS PRN
Status: DISCONTINUED | OUTPATIENT
Start: 2023-06-19 | End: 2023-06-19 | Stop reason: HOSPADM

## 2023-06-19 RX ORDER — DIPHENHYDRAMINE HYDROCHLORIDE 50 MG/ML
25-50 INJECTION INTRAMUSCULAR; INTRAVENOUS
Status: DISCONTINUED | OUTPATIENT
Start: 2023-06-19 | End: 2023-06-19 | Stop reason: HOSPADM

## 2023-06-19 RX ORDER — ATROPINE SULFATE 0.1 MG/ML
1 INJECTION INTRAVENOUS
Status: DISCONTINUED | OUTPATIENT
Start: 2023-06-19 | End: 2023-06-19 | Stop reason: HOSPADM

## 2023-06-19 RX ORDER — EPINEPHRINE 1 MG/ML
0.1 INJECTION, SOLUTION INTRAMUSCULAR; SUBCUTANEOUS
Status: DISCONTINUED | OUTPATIENT
Start: 2023-06-19 | End: 2023-06-19 | Stop reason: HOSPADM

## 2023-06-19 RX ORDER — PROCHLORPERAZINE MALEATE 10 MG
10 TABLET ORAL EVERY 6 HOURS PRN
Status: DISCONTINUED | OUTPATIENT
Start: 2023-06-19 | End: 2023-06-19 | Stop reason: HOSPADM

## 2023-06-19 RX ORDER — ONDANSETRON 4 MG/1
4 TABLET, ORALLY DISINTEGRATING ORAL EVERY 6 HOURS PRN
Status: DISCONTINUED | OUTPATIENT
Start: 2023-06-19 | End: 2023-06-19 | Stop reason: HOSPADM

## 2023-06-19 RX ORDER — FLUMAZENIL 0.1 MG/ML
0.2 INJECTION, SOLUTION INTRAVENOUS
Status: DISCONTINUED | OUTPATIENT
Start: 2023-06-19 | End: 2023-06-19 | Stop reason: HOSPADM

## 2023-06-19 RX ORDER — FENTANYL CITRATE 50 UG/ML
50-100 INJECTION, SOLUTION INTRAMUSCULAR; INTRAVENOUS EVERY 5 MIN PRN
Status: DISCONTINUED | OUTPATIENT
Start: 2023-06-19 | End: 2023-06-19 | Stop reason: HOSPADM

## 2023-06-19 RX ORDER — NALOXONE HYDROCHLORIDE 0.4 MG/ML
0.2 INJECTION, SOLUTION INTRAMUSCULAR; INTRAVENOUS; SUBCUTANEOUS
Status: DISCONTINUED | OUTPATIENT
Start: 2023-06-19 | End: 2023-06-19 | Stop reason: HOSPADM

## 2023-06-19 RX ORDER — SIMETHICONE 40MG/0.6ML
133 SUSPENSION, DROPS(FINAL DOSAGE FORM)(ML) ORAL
Status: DISCONTINUED | OUTPATIENT
Start: 2023-06-19 | End: 2023-06-19 | Stop reason: HOSPADM

## 2023-06-19 RX ORDER — NALOXONE HYDROCHLORIDE 0.4 MG/ML
0.4 INJECTION, SOLUTION INTRAMUSCULAR; INTRAVENOUS; SUBCUTANEOUS
Status: DISCONTINUED | OUTPATIENT
Start: 2023-06-19 | End: 2023-06-19 | Stop reason: HOSPADM

## 2023-06-19 RX ORDER — ONDANSETRON 2 MG/ML
4 INJECTION INTRAMUSCULAR; INTRAVENOUS
Status: DISCONTINUED | OUTPATIENT
Start: 2023-06-19 | End: 2023-06-19 | Stop reason: HOSPADM

## 2023-06-19 RX ADMIN — MIDAZOLAM 2 MG: 1 INJECTION INTRAMUSCULAR; INTRAVENOUS at 13:53

## 2023-06-19 RX ADMIN — FENTANYL CITRATE 100 MCG: 50 INJECTION, SOLUTION INTRAMUSCULAR; INTRAVENOUS at 13:53

## 2023-06-19 ASSESSMENT — ACTIVITIES OF DAILY LIVING (ADL): ADLS_ACUITY_SCORE: 35

## 2023-06-19 NOTE — H&P
Pre-Endoscopy History and Physical     Thomas Pierce MRN# [id number]   YOB: 1970 Age: 53 year old     Date of Procedure: 6/19/2023  Primary care provider: Jasmyn Giles  Type of Endoscopy: Colonoscopy with possible biopsy, possible polypectomy  Reason for Procedure: screen  Type of Anesthesia Anticipated: Conscious Sedation    HPI:    Thomas is a 53 year old male who will be undergoing the above procedure.      A history and physical has been performed. The patient's medications and allergies have been reviewed. The risks and benefits of the procedure and the sedation options and risks were discussed with the patient.  All questions were answered and informed consent was obtained.      He denies a personal or family history of anesthesia complications or bleeding disorders.     Patient Active Problem List   Diagnosis     Hypertension goal BP (blood pressure) < 140/90     Type 2 diabetes mellitus without complication, without long-term current use of insulin (H)     Hyperlipidemia LDL goal <100     Aftercare following surgery of the musculoskeletal system     S/P arthroscopy of left shoulder     Complete tear of left rotator cuff, unspecified whether traumatic     Cervical radiculopathy     Morbid obesity (H)        Past Medical History:   Diagnosis Date     Diabetes (H)      Hyperlipidemia LDL goal <160 10/20/2011     Hypertension goal BP (blood pressure) < 140/90 10/20/2011     Prediabetes         Past Surgical History:   Procedure Laterality Date     ARTHROSCOPY SHOULDER ROTATOR CUFF REPAIR Left 1/13/2021    Procedure: LEFT ARTHROSCOPY, SHOULDER, WITH REVISION ROTATOR CUFF REPAIR, SUBACROMIAL DECOMPRESSION AND CUPSULAR RELEASE;  Surgeon: David Boyd MD;  Location: [facility] OR     ARTHROSCOPY SHOULDER ROTATOR CUFF REPAIR, BICEP TENODESIS REPAIR Left 5/12/2020    Procedure: LEFT SHOULDER ARTHROSCOPIC ROTATOR CUFF REPAIR, BICEPS TENODESIS, SUBACROMIAL DECOMPRESSION,   DEBRIDEMENT;  Surgeon: David Boyd MD;  Location: [facility] OR     MANDIBLE SURGERY  1978    trauma while sledding       Social History     Tobacco Use     Smoking status: Never     Smokeless tobacco: Never   Vaping Use     Vaping status: Not on file   Substance Use Topics     Alcohol use: Yes     Comment: occasionally        Family History   Problem Relation Age of Onset     Glaucoma Mother      Diabetes Mother         Type II     Hypertension Mother      Hyperlipidemia Mother      Cardiovascular Maternal Grandmother         PAD     Hepatitis Maternal Grandmother      Ovarian Cancer Maternal Aunt      Colon Polyps Cousin      Colon Cancer No family hx of      Prostate Cancer No family hx of      Coronary Artery Disease No family hx of      Cerebrovascular Disease No family hx of      Macular Degeneration No family hx of        Prior to Admission medications    Medication Sig Start Date End Date Taking? Authorizing Provider   bisacodyl (DULCOLAX) 5 MG EC tablet Take 2 tablets at 3 pm the day before your procedure. If your procedure is before 11 am, take 2 additional tablets at 11 pm. If your procedure is after 11 am, take 2 additional tablets at 6 am. For additional instructions refer to your colonoscopy prep instructions. 5/30/23  Yes Marco Recio MD   polyethylene glycol (GOLYTELY) 236 g suspension The night before the exam at 6 pm drink an 8-ounce glass every 15 minutes until the jug is half empty. If you arrive before 11 AM: Drink the other half of the Golytely jug at 11 PM night before procedure. If you arrive after 11 AM: Drink the other half of the Golytely jug at 6 AM day of procedure. For additional instructions refer to your colonoscopy prep instructions. 5/30/23  Yes Marco Recio MD   aspirin 81 MG tablet Take 1 tablet by mouth daily.    Reported, Patient   blood glucose monitoring (ROSEANNE MICROLET) lancets Use to test blood sugar 1-2 times daily. 5/7/20   Jasmyn Giles PA-C    chlorthalidone (HYGROTON) 25 MG tablet Take 1 tablet (25 mg) by mouth daily 2/13/23   Julia Boland APRN CNP   CONTOUR NEXT TEST test strip Use to test blood sugar 1-2 times daily. 5/7/20   Jasmyn Giles PA-C   dulaglutide (TRULICITY) 0.75 MG/0.5ML pen Inject 0.75 mg Subcutaneous every 7 days 2/13/23   Julia Boland APRN CNP   lisinopril (ZESTRIL) 40 MG tablet Take 1 tablet (40 mg) by mouth daily 2/13/23   Julia Boland APRN CNP   metFORMIN (GLUCOPHAGE XR) 500 MG 24 hr tablet Take 4 tablets (2,000 mg) by mouth At Bedtime 2/13/23   Julia Boland APRN CNP   rosuvastatin (CRESTOR) 40 MG tablet Take 1 tablet (40 mg) by mouth daily 2/27/23   Julia Boland APRN CNP       No Known Allergies     REVIEW OF SYSTEMS:   5 point ROS negative except as noted above in HPI, including Gen., Resp., CV, GI &  system review.    PHYSICAL EXAM:   BP (!) 140/100   Pulse 93   Resp 14   SpO2 93%  Estimated body mass index is 36.21 kg/m  as calculated from the following:    Height as of 2/13/23: 1.829 m (6').    Weight as of 2/13/23: 121.1 kg (267 lb).   GENERAL APPEARANCE: alert, and oriented  MENTAL STATUS: alert  AIRWAY EXAM: Mallampatti Class I (visualization of the soft palate, fauces, uvula, anterior and posterior pillars)  RESP: lungs clear to auscultation - no rales, rhonchi or wheezes  CV: regular rates and rhythm  DIAGNOSTICS:    Not indicated    IMPRESSION   ASA Class 2 - Mild systemic disease    PLAN:   Plan for Colonoscopy with possible biopsy, possible polypectomy. We discussed the risks, benefits and alternatives and the patient wished to proceed.    The above has been forwarded to the consulting provider.      Signed Electronically by: Marco Recio MD  June 19, 2023

## 2023-06-20 LAB
PATH REPORT.COMMENTS IMP SPEC: NORMAL
PATH REPORT.COMMENTS IMP SPEC: NORMAL
PATH REPORT.FINAL DX SPEC: NORMAL
PATH REPORT.GROSS SPEC: NORMAL
PATH REPORT.MICROSCOPIC SPEC OTHER STN: NORMAL
PATH REPORT.RELEVANT HX SPEC: NORMAL
PHOTO IMAGE: NORMAL

## 2024-01-20 ENCOUNTER — HEALTH MAINTENANCE LETTER (OUTPATIENT)
Age: 54
End: 2024-01-20

## 2024-03-02 ENCOUNTER — TRANSFERRED RECORDS (OUTPATIENT)
Dept: HEALTH INFORMATION MANAGEMENT | Facility: CLINIC | Age: 54
End: 2024-03-02

## 2024-03-07 ENCOUNTER — MYC REFILL (OUTPATIENT)
Dept: FAMILY MEDICINE | Facility: CLINIC | Age: 54
End: 2024-03-07
Payer: COMMERCIAL

## 2024-03-07 DIAGNOSIS — E11.9 TYPE 2 DIABETES MELLITUS WITHOUT COMPLICATION, WITHOUT LONG-TERM CURRENT USE OF INSULIN (H): ICD-10-CM

## 2024-03-07 RX ORDER — LANCING DEVICE/LANCETS
1 KIT MISCELLANEOUS 2 TIMES DAILY PRN
Qty: 1 EACH | Status: SHIPPED | OUTPATIENT
Start: 2024-03-07

## 2024-03-27 ENCOUNTER — TRANSFERRED RECORDS (OUTPATIENT)
Dept: HEALTH INFORMATION MANAGEMENT | Facility: CLINIC | Age: 54
End: 2024-03-27
Payer: COMMERCIAL

## 2024-03-30 ENCOUNTER — HEALTH MAINTENANCE LETTER (OUTPATIENT)
Age: 54
End: 2024-03-30

## 2024-04-14 SDOH — HEALTH STABILITY: PHYSICAL HEALTH: ON AVERAGE, HOW MANY DAYS PER WEEK DO YOU ENGAGE IN MODERATE TO STRENUOUS EXERCISE (LIKE A BRISK WALK)?: 0 DAYS

## 2024-04-14 SDOH — HEALTH STABILITY: PHYSICAL HEALTH: ON AVERAGE, HOW MANY MINUTES DO YOU ENGAGE IN EXERCISE AT THIS LEVEL?: 0 MIN

## 2024-04-14 ASSESSMENT — SOCIAL DETERMINANTS OF HEALTH (SDOH): HOW OFTEN DO YOU GET TOGETHER WITH FRIENDS OR RELATIVES?: ONCE A WEEK

## 2024-04-14 NOTE — COMMUNITY RESOURCES LIST (ENGLISH)
April 14, 2024           YOUR PERSONALIZED LIST OF SERVICES & PROGRAMS           & RECREATION    Sports      [facility] - [facility] [employer]- Women  [address] [place] [address] (Distance: 15.7 miles)  Website: [email or web address] [email or web address]  Language: English, Indonesian  Fee: Free      [facility] ([facility]) - Sports clubs and recreational activities  [address] [place] [address] [place] [address] (Distance: 8.9 miles)  Phone: [phone]) [phone]  Website: https://www.[employer]/domain/353  Language: English, Indonesian  Fee: Self pay  Accessibility: Ada accessible      [employer]ASEBALL AND SOFTBALL  Website: http://www.BigRoadleague.org    Classes/Groups      [facility] ([facility]) - Yoga or Pilates classes  [address] [place] [address] (Distance: 8.9 miles)  Phone: [phone]  Website: https://wwwappMobi[email or web address]/353  Language: English, Indonesian  Fee: Self pay  Accessibility: Ada accessible      [facility] ([facility]) - Group fitness classes  [address] [place] [address] (Distance: 8.9 miles)  Phone: [phone]  Website: https://wwwappMobi[address]/domain/353  Language: English, Indonesian  Fee: Self pay  Accessibility: Ada accessible      [facility] - Adult Enrichment  Phone: [phone]  Website: https://[place]/adults-seniors/adult-enrichment/  Language: English  Hours: Mon 7:30 AM - 4:00 PM Tue 7:30 AM - 4:00 PM Wed 7:30 AM - 4:00 PM Thu 7:30 AM - 4:00 PM Fri 7:30 AM - 4:00 PM               IMPORTANT NUMBERS & WEBSITES        Emergency Services  911  .   United Way  211 http://211unitedway.org  .   Poison Control  (661) 300-7329 http://[place]poison.org http://wis[place]poison.org  .     Suicide and Crisis Lifeline  988 http://988lifeline.org  .   Childhelp [place] Child Abuse Hotline  897.611.4223 http://Childhelphotline.org   .   National Sexual Assault  Hotline  (762) 217-9673 (HOPE) http://Sococon.NEMO Equipment   .     National Runaway Safeline  (952) 269-3717 (RUNAWAY) http://MC2.NEMO Equipment  .   Pregnancy & Postpartum Support  Call/text 798-100-4847  [place]: http://ppsupport[place].org  [place]: http://psichapters.com/wi  .   Substance Abuse National Helpline ([facility])  152-296-HELP (3848) http://Findtreatment.gov   .                DISCLAIMER: These resources have been generated via the [employer]. [employer] does not endorse any service providers mentioned in this resource list. [employer] does not guarantee that the services mentioned in this resource list will be available to you or will improve your health or wellness.    [facility]

## 2024-04-15 ENCOUNTER — OFFICE VISIT (OUTPATIENT)
Dept: FAMILY MEDICINE | Facility: CLINIC | Age: 54
End: 2024-04-15
Payer: COMMERCIAL

## 2024-04-15 VITALS
WEIGHT: 242 LBS | SYSTOLIC BLOOD PRESSURE: 90 MMHG | BODY MASS INDEX: 32.78 KG/M2 | OXYGEN SATURATION: 98 % | HEIGHT: 72 IN | TEMPERATURE: 98.1 F | DIASTOLIC BLOOD PRESSURE: 60 MMHG | HEART RATE: 95 BPM

## 2024-04-15 DIAGNOSIS — E66.01 MORBID OBESITY (H): ICD-10-CM

## 2024-04-15 DIAGNOSIS — Z13.1 SCREENING FOR DIABETES MELLITUS: ICD-10-CM

## 2024-04-15 DIAGNOSIS — Z00.00 ROUTINE GENERAL MEDICAL EXAMINATION AT A HEALTH CARE FACILITY: Primary | ICD-10-CM

## 2024-04-15 DIAGNOSIS — Z13.0 SCREENING FOR DEFICIENCY ANEMIA: ICD-10-CM

## 2024-04-15 DIAGNOSIS — Z13.29 SCREENING FOR THYROID DISORDER: ICD-10-CM

## 2024-04-15 DIAGNOSIS — Z79.899 MEDICATION MANAGEMENT: ICD-10-CM

## 2024-04-15 DIAGNOSIS — Z12.5 SCREENING FOR PROSTATE CANCER: ICD-10-CM

## 2024-04-15 DIAGNOSIS — I10 HTN, GOAL BELOW 130/80: ICD-10-CM

## 2024-04-15 DIAGNOSIS — E78.5 HYPERLIPIDEMIA LDL GOAL <100: ICD-10-CM

## 2024-04-15 DIAGNOSIS — E11.9 TYPE 2 DIABETES MELLITUS WITHOUT COMPLICATION, WITHOUT LONG-TERM CURRENT USE OF INSULIN (H): ICD-10-CM

## 2024-04-15 PROCEDURE — 99396 PREV VISIT EST AGE 40-64: CPT | Performed by: NURSE PRACTITIONER

## 2024-04-15 PROCEDURE — 99214 OFFICE O/P EST MOD 30 MIN: CPT | Mod: 25 | Performed by: NURSE PRACTITIONER

## 2024-04-15 PROCEDURE — 99207 PR FOOT EXAM NO CHARGE: CPT | Performed by: NURSE PRACTITIONER

## 2024-04-15 RX ORDER — METFORMIN HCL 500 MG
2000 TABLET, EXTENDED RELEASE 24 HR ORAL AT BEDTIME
Qty: 360 TABLET | Refills: 4 | Status: SHIPPED | OUTPATIENT
Start: 2024-04-15

## 2024-04-15 RX ORDER — LISINOPRIL 20 MG/1
20 TABLET ORAL DAILY
Qty: 90 TABLET | Refills: 4 | Status: SHIPPED | OUTPATIENT
Start: 2024-04-15

## 2024-04-15 RX ORDER — DULAGLUTIDE 0.75 MG/.5ML
0.75 INJECTION, SOLUTION SUBCUTANEOUS
Qty: 6 ML | Refills: 3 | Status: CANCELLED | OUTPATIENT
Start: 2024-04-15

## 2024-04-15 RX ORDER — CHLORTHALIDONE 25 MG/1
25 TABLET ORAL DAILY
Qty: 90 TABLET | Refills: 4 | Status: SHIPPED | OUTPATIENT
Start: 2024-04-15

## 2024-04-15 RX ORDER — ROSUVASTATIN CALCIUM 40 MG/1
40 TABLET, COATED ORAL DAILY
Qty: 90 TABLET | Refills: 4 | Status: SHIPPED | OUTPATIENT
Start: 2024-04-15

## 2024-04-15 RX ORDER — LISINOPRIL 30 MG/1
30 TABLET ORAL DAILY
Status: CANCELLED | OUTPATIENT
Start: 2024-04-15

## 2024-04-15 NOTE — PROGRESS NOTES
Preventive Care Visit  [facility]  CARLYN Howard CNP, Nurse Practitioner - Family  Apr 15, 2024      Assessment & Plan     Routine general medical examination at a health care facility  Wellness exam completed today.    Fasting labs today.    Will notify of lab results.   - REVIEW OF HEALTH MAINTENANCE PROTOCOL ORDERS    Type 2 diabetes mellitus without complication, without long-term current use of insulin (H)  No concerns.  Stable.  Continue same medication this was refilled today.   - Albumin Random Urine Quantitative with Creat Ratio  - metFORMIN (GLUCOPHAGE XR) 500 MG 24 hr tablet  Dispense: 360 tablet; Refill: 4  - rosuvastatin (CRESTOR) 40 MG tablet  Dispense: 90 tablet; Refill: 4  - Vitamin B12  - FOOT EXAM    Screening for prostate cancer    - PROSTATE SPEC ANTIGEN SCREEN    HTN, goal below 130/80  No concerns.  Stable.  Continue same medication this was refilled today.   - chlorthalidone (HYGROTON) 25 MG tablet  Dispense: 90 tablet; Refill: 4  - rosuvastatin (CRESTOR) 40 MG tablet  Dispense: 90 tablet; Refill: 4  - lisinopril (ZESTRIL) 20 MG tablet  Dispense: 90 tablet; Refill: 4    Hyperlipidemia LDL goal <100  No concerns.  Stable.  Continue same medication this was refilled today.   - rosuvastatin (CRESTOR) 40 MG tablet  Dispense: 90 tablet; Refill: 4  - Lipid panel reflex to direct LDL Fasting    Morbid obesity (H)  No concerns.  Stable.  Continue same medication this was refilled today.   - rosuvastatin (CRESTOR) 40 MG tablet  Dispense: 90 tablet; Refill: 4    Medication management    - Comprehensive metabolic panel    Screening for diabetes mellitus    - Comprehensive metabolic panel    Screening for deficiency anemia    - CBC with platelets    Screening for thyroid disorder    - TSH with free T4 reflex          BMI  Estimated body mass index is 32.82 kg/m  as calculated from the following:    Height as of this encounter: 1.829 m (6').    Weight as of this encounter:  109.8 kg (242 lb).   Weight management plan: Discussed healthy diet and exercise guidelines    Counseling  Appropriate preventive services were discussed with this patient.       Return in about 6 months (around 10/15/2024).     Subjective   Nick is a 54 year old, presenting for the following:  Physical        4/15/2024     4:08 PM   Additional Questions   Roomed by courtney hope        Health Care Directive  Patient does not have a Health Care Directive or Living Will:     HPI    Diabetes Follow-up    How often are you checking your blood sugar? One time daily  What time of day are you checking your blood sugars (select all that apply)?  Before meals  Have you had any blood sugars above 200?  No  Have you had any blood sugars below 70?  No  What symptoms do you notice when your blood sugar is low?  None and Not applicable  What concerns do you have today about your diabetes? None   Do you have any of these symptoms? (Select all that apply)  No numbness or tingling in feet.  No redness, sores or blisters on feet.  No complaints of excessive thirst.  No reports of blurry vision.  No significant changes to weight.    3 L water  Livea meals. Eat eery 2-3 hours; meals and more snacks  Protein veggies   100-150      Hyperlipidemia Follow-Up    Are you regularly taking any medication or supplement to lower your cholesterol?   Yes- rosuvastatin  Are you having muscle aches or other side effects that you think could be caused by your cholesterol lowering medication?  No    Hypertension Follow-up    Do you check your blood pressure regularly outside of the clinic? No   Are you following a low salt diet? yes  Are your blood pressures ever more than 140 on the top number (systolic) OR more   than 90 on the bottom number (diastolic), for example 140/90? na      Wt Readings from Last 5 Encounters:   04/15/24 109.8 kg (242 lb)   02/13/23 121.1 kg (267 lb)   04/27/22 110.7 kg (244 lb)   01/19/22 111.6 kg (246 lb)   01/12/22 111.6 kg (246  lb)        BP Readings from Last 2 Encounters:   04/15/24 90/60   06/19/23 106/69     Hemoglobin A1C (%)   Date Value   04/21/2024 8.3 (H)   05/27/2023 7.9 (H)   05/06/2021 6.0 (H)   01/07/2021 8.6 (H)     LDL Cholesterol Calculated (mg/dL)   Date Value   04/21/2024 39   05/27/2023 96   01/28/2021 92   05/07/2020 110 (H)           4/14/2024   General Health   How would you rate your overall physical health? Good   Feel stress (tense, anxious, or unable to sleep) To some extent   (!) STRESS CONCERN      4/14/2024   Nutrition   Three or more servings of calcium each day? Yes   Diet: Diabetic    Other   If other, please elaborate: Currently on Livea diet   How many servings of fruit and vegetables per day? 4 or more   How many sweetened beverages each day? 0-1         4/14/2024   Exercise   Days per week of moderate/strenous exercise 0 days   Average minutes spent exercising at this level 0 min   (!) EXERCISE CONCERN      4/14/2024   Social Factors   Frequency of gathering with friends or relatives Once a week   Worry food won't last until get money to buy more No   Food not last or not have enough money for food? No   Do you have housing?  Yes   Are you worried about losing your housing? No   Lack of transportation? No   Unable to get utilities (heat,electricity)? No         4/14/2024   Fall Risk   Fallen 2 or more times in the past year? No   Trouble with walking or balance? No          4/14/2024   Dental   Dentist two times every year? (!) NO         4/14/2024   TB Screening   Were you born outside of the [place]? No         Today's PHQ-2 Score:       4/14/2024     5:47 PM   PHQ-2 ( 1999 [employer])   Q1: Little interest or pleasure in doing things 0   Q2: Feeling down, depressed or hopeless 0   PHQ-2 Score 0   Q1: Little interest or pleasure in doing things Not at all   Q2: Feeling down, depressed or hopeless Not at all   PHQ-2 Score 0           4/14/2024   Substance Use   Alcohol more than 3/day or more than 7/wk No   Do  you use any other substances recreationally? No     Social History     Tobacco Use    Smoking status: Never    Smokeless tobacco: Never   Substance Use Topics    Alcohol use: Yes     Comment: occasionally     Drug use: No           4/14/2024   STI Screening   New sexual partner(s) since last STI/HIV test? No   Last PSA:   PSA   Date Value Ref Range Status   11/04/2019 0.68 0 - 4 ug/L Final     Comment:     Assay Method:  Chemiluminescence using Siemens Vista analyzer     Prostate Specific Antigen Screen   Date Value Ref Range Status   04/21/2024 0.86 0.00 - 3.50 ng/mL Final   04/27/2022 0.67 0.00 - 4.00 ug/L Final     ASCVD Risk   The ASCVD Risk score ([place] QUEEN, et al., 2019) failed to calculate for the following reasons:    The valid total cholesterol range is 130 to 320 mg/dL      Reviewed and updated as needed this visit by Provider   Tobacco  Allergies  Meds  Problems  Med Hx  Surg Hx  Fam Hx            Constitutional, HEENT, cardiovascular, pulmonary, GI, , musculoskeletal, neuro, skin, endocrine and psych systems are negative, except as otherwise noted in the HPI.      Objective    Exam  BP 90/60   Pulse 95   Temp 98.1  F (36.7  C)   Ht 1.829 m (6')   Wt 109.8 kg (242 lb)   SpO2 98%   BMI 32.82 kg/m     Estimated body mass index is 32.82 kg/m  as calculated from the following:    Height as of this encounter: 1.829 m (6').    Weight as of this encounter: 109.8 kg (242 lb).    Physical Exam  GENERAL: alert and no distress  EYES: Eyes grossly normal to inspection, PERRL and conjunctivae and sclerae normal  HENT: ear canals and TM's normal, nose and mouth without ulcers or lesions  NECK: no adenopathy, no asymmetry, masses, or scars  RESP: lungs clear to auscultation - no rales, rhonchi or wheezes  CV: regular rate and rhythm, normal S1 S2, no S3 or S4, no murmur, click or rub, no peripheral edema  ABDOMEN: soft, nontender, no hepatosplenomegaly, no masses and bowel sounds normal  MS: no  gross musculoskeletal defects noted, no edema  SKIN: no suspicious lesions or rashes  NEURO: Normal strength and tone, mentation intact and speech normal  PSYCH: mentation appears normal, affect normal/bright  Diabetic foot exam: normal DP and PT pulses, no trophic changes or ulcerative lesions, and normal sensory exam         Signed Electronically by: CARLYN Howard CNP

## 2024-04-21 ENCOUNTER — LAB (OUTPATIENT)
Dept: LAB | Facility: CLINIC | Age: 54
End: 2024-04-21
Payer: COMMERCIAL

## 2024-04-21 DIAGNOSIS — Z13.0 SCREENING FOR DEFICIENCY ANEMIA: ICD-10-CM

## 2024-04-21 DIAGNOSIS — Z79.899 MEDICATION MANAGEMENT: ICD-10-CM

## 2024-04-21 DIAGNOSIS — Z13.1 SCREENING FOR DIABETES MELLITUS: ICD-10-CM

## 2024-04-21 DIAGNOSIS — Z12.5 SCREENING FOR PROSTATE CANCER: ICD-10-CM

## 2024-04-21 DIAGNOSIS — Z13.29 SCREENING FOR THYROID DISORDER: ICD-10-CM

## 2024-04-21 DIAGNOSIS — E11.9 TYPE 2 DIABETES MELLITUS WITHOUT COMPLICATION, WITHOUT LONG-TERM CURRENT USE OF INSULIN (H): ICD-10-CM

## 2024-04-21 DIAGNOSIS — E78.5 HYPERLIPIDEMIA LDL GOAL <100: ICD-10-CM

## 2024-04-21 LAB
ALBUMIN SERPL BCG-MCNC: 4.8 G/DL (ref 3.5–5.2)
ALP SERPL-CCNC: 55 U/L (ref 40–150)
ALT SERPL W P-5'-P-CCNC: 26 U/L (ref 0–70)
ANION GAP SERPL CALCULATED.3IONS-SCNC: 14 MMOL/L (ref 7–15)
AST SERPL W P-5'-P-CCNC: 20 U/L (ref 0–45)
BILIRUB SERPL-MCNC: 0.5 MG/DL
BUN SERPL-MCNC: 25.5 MG/DL (ref 6–20)
CALCIUM SERPL-MCNC: 9.6 MG/DL (ref 8.6–10)
CHLORIDE SERPL-SCNC: 95 MMOL/L (ref 98–107)
CHOLEST SERPL-MCNC: 106 MG/DL
CREAT SERPL-MCNC: 1.05 MG/DL (ref 0.67–1.17)
CREAT UR-MCNC: 84.4 MG/DL
DEPRECATED HCO3 PLAS-SCNC: 22 MMOL/L (ref 22–29)
EGFRCR SERPLBLD CKD-EPI 2021: 83 ML/MIN/1.73M2
ERYTHROCYTE [DISTWIDTH] IN BLOOD BY AUTOMATED COUNT: 12.3 % (ref 10–15)
FASTING STATUS PATIENT QL REPORTED: YES
GLUCOSE SERPL-MCNC: 109 MG/DL (ref 70–99)
HCT VFR BLD AUTO: 41.5 % (ref 40–53)
HDLC SERPL-MCNC: 55 MG/DL
HGB BLD-MCNC: 14.8 G/DL (ref 13.3–17.7)
LDLC SERPL CALC-MCNC: 39 MG/DL
MCH RBC QN AUTO: 30.3 PG (ref 26.5–33)
MCHC RBC AUTO-ENTMCNC: 35.7 G/DL (ref 31.5–36.5)
MCV RBC AUTO: 85 FL (ref 78–100)
MICROALBUMIN UR-MCNC: <12 MG/L
MICROALBUMIN/CREAT UR: NORMAL MG/G{CREAT}
NONHDLC SERPL-MCNC: 51 MG/DL
PLATELET # BLD AUTO: 299 10E3/UL (ref 150–450)
POTASSIUM SERPL-SCNC: 4.4 MMOL/L (ref 3.4–5.3)
PROT SERPL-MCNC: 7.5 G/DL (ref 6.4–8.3)
PSA SERPL DL<=0.01 NG/ML-MCNC: 0.86 NG/ML (ref 0–3.5)
RBC # BLD AUTO: 4.88 10E6/UL (ref 4.4–5.9)
SODIUM SERPL-SCNC: 131 MMOL/L (ref 135–145)
TRIGL SERPL-MCNC: 61 MG/DL
TSH SERPL DL<=0.005 MIU/L-ACNC: 2.38 UIU/ML (ref 0.3–4.2)
VIT B12 SERPL-MCNC: 577 PG/ML (ref 232–1245)
WBC # BLD AUTO: 7.1 10E3/UL (ref 4–11)

## 2024-04-21 PROCEDURE — 36415 COLL VENOUS BLD VENIPUNCTURE: CPT

## 2024-04-21 PROCEDURE — G0103 PSA SCREENING: HCPCS

## 2024-04-21 PROCEDURE — 82607 VITAMIN B-12: CPT

## 2024-04-21 PROCEDURE — 85027 COMPLETE CBC AUTOMATED: CPT

## 2024-04-21 PROCEDURE — 80061 LIPID PANEL: CPT

## 2024-04-21 PROCEDURE — 80053 COMPREHEN METABOLIC PANEL: CPT

## 2024-04-21 PROCEDURE — 83036 HEMOGLOBIN GLYCOSYLATED A1C: CPT

## 2024-04-21 PROCEDURE — 82570 ASSAY OF URINE CREATININE: CPT

## 2024-04-21 PROCEDURE — 84443 ASSAY THYROID STIM HORMONE: CPT

## 2024-04-23 ENCOUNTER — MYC MEDICAL ADVICE (OUTPATIENT)
Dept: FAMILY MEDICINE | Facility: CLINIC | Age: 54
End: 2024-04-23
Payer: COMMERCIAL

## 2024-04-23 DIAGNOSIS — E11.9 TYPE 2 DIABETES MELLITUS WITHOUT COMPLICATION, WITHOUT LONG-TERM CURRENT USE OF INSULIN (H): Primary | ICD-10-CM

## 2024-04-23 DIAGNOSIS — E87.1 HYPONATREMIA: ICD-10-CM

## 2024-04-23 NOTE — TELEPHONE ENCOUNTER
Please see my chart message below     Please review and advise     Thank you     Giselle Bruce RN, BSN  [facility]

## 2024-04-26 DIAGNOSIS — E11.9 TYPE 2 DIABETES MELLITUS WITHOUT COMPLICATION, WITHOUT LONG-TERM CURRENT USE OF INSULIN (H): Primary | ICD-10-CM

## 2024-04-26 LAB — HBA1C MFR BLD: 8.3 %

## 2024-05-03 NOTE — RESULT ENCOUNTER NOTE
Mychart message sent.   Decrease fluids.   Recheck A1c in 3 months and consider another med.     ANTONINO Howard  [facility]

## 2024-06-13 ENCOUNTER — MYC MEDICAL ADVICE (OUTPATIENT)
Dept: FAMILY MEDICINE | Facility: CLINIC | Age: 54
End: 2024-06-13
Payer: COMMERCIAL

## 2024-08-03 ENCOUNTER — LAB (OUTPATIENT)
Dept: LAB | Facility: CLINIC | Age: 54
End: 2024-08-03
Payer: COMMERCIAL

## 2024-08-03 DIAGNOSIS — E87.1 HYPONATREMIA: ICD-10-CM

## 2024-08-03 DIAGNOSIS — E11.9 TYPE 2 DIABETES MELLITUS WITHOUT COMPLICATION, WITHOUT LONG-TERM CURRENT USE OF INSULIN (H): ICD-10-CM

## 2024-08-03 LAB
ANION GAP SERPL CALCULATED.3IONS-SCNC: 13 MMOL/L (ref 7–15)
BUN SERPL-MCNC: 12.8 MG/DL (ref 6–20)
CALCIUM SERPL-MCNC: 9.3 MG/DL (ref 8.8–10.4)
CHLORIDE SERPL-SCNC: 100 MMOL/L (ref 98–107)
CREAT SERPL-MCNC: 0.77 MG/DL (ref 0.67–1.17)
EGFRCR SERPLBLD CKD-EPI 2021: >90 ML/MIN/1.73M2
GLUCOSE SERPL-MCNC: 105 MG/DL (ref 70–99)
HBA1C MFR BLD: 5.2 %
HCO3 SERPL-SCNC: 23 MMOL/L (ref 22–29)
POTASSIUM SERPL-SCNC: 3.8 MMOL/L (ref 3.4–5.3)
SODIUM SERPL-SCNC: 136 MMOL/L (ref 135–145)

## 2024-08-03 PROCEDURE — 80048 BASIC METABOLIC PNL TOTAL CA: CPT

## 2024-08-03 PROCEDURE — 83036 HEMOGLOBIN GLYCOSYLATED A1C: CPT

## 2024-08-03 PROCEDURE — 36415 COLL VENOUS BLD VENIPUNCTURE: CPT

## 2024-08-04 DIAGNOSIS — E11.9 TYPE 2 DIABETES MELLITUS WITHOUT COMPLICATION, WITHOUT LONG-TERM CURRENT USE OF INSULIN (H): Primary | ICD-10-CM

## 2024-08-04 NOTE — RESULT ENCOUNTER NOTE
Dear Nick,    Here is a summary of your recent test results:    -Great job. A1C (test of diabetes control the last 2-3 months) is at your goal. Please continue with your current plan. Also, you should make an appointment to recheck your A1C test in 6 months.     For additional lab test information, [email or web address] is an excellent reference.    In addition, here is a list of due or overdue Health Maintenance reminders:    Pneumococcal Vaccine(2 of 2 - PCV) due on 10/16/2019    Please call us at [phone] (or use [employer]) to address the above recommendations if needed.    [facility] you [facility].  It was an honor and a privilege to participate in your care.       Healthy regards,    Julia Boland, ANTONINO  [facility]

## 2024-08-17 ENCOUNTER — TRANSFERRED RECORDS (OUTPATIENT)
Dept: HEALTH INFORMATION MANAGEMENT | Facility: CLINIC | Age: 54
End: 2024-08-17

## 2024-08-24 ENCOUNTER — MEDICAL CORRESPONDENCE (OUTPATIENT)
Dept: HEALTH INFORMATION MANAGEMENT | Facility: CLINIC | Age: 54
End: 2024-08-24
Payer: COMMERCIAL

## 2024-12-18 NOTE — PROGRESS NOTES
Assessment & Plan   Problem List Items Addressed This Visit     Hypertension goal BP (blood pressure) < 140/90  Well-controlled.  Continue current regimen.    Relevant Medications    lisinopril (ZESTRIL) 40 MG tablet    chlorthalidone (HYGROTON) 25 MG tablet    Other Relevant Orders    Albumin Random Urine Quantitative with Creat Ratio (Completed)    Comprehensive metabolic panel (BMP + Alb, Alk Phos, ALT, AST, Total. Bili, TP) (Completed)    Type 2 diabetes mellitus without complication, without long-term current use of insulin (H)  Suboptimally controlled.  Shoulder surgeon would like more aggressive control/care of his diabetes due to healing concerns.  Continue Metformin 2000 mg daily and start Trulicity 0.75 milligrams once weekly.  Also will have him revisit with diabetic education.  Follow-up in 3 months or sooner if needed.    Relevant Medications    metFORMIN (GLUCOPHAGE-XR) 500 MG 24 hr tablet    dulaglutide (TRULICITY) 0.75 MG/0.5ML pen    Other Relevant Orders    Albumin Random Urine Quantitative with Creat Ratio (Completed)    Comprehensive metabolic panel (BMP + Alb, Alk Phos, ALT, AST, Total. Bili, TP) (Completed)    CBC with platelets and differential (Completed)    AMBULATORY ADULT DIABETES EDUCATOR REFERRAL    Hyperlipidemia LDL goal <100  We will recheck labs today.  Continue current regimen.    Relevant Medications    rosuvastatin (CRESTOR) 20 MG tablet    Other Relevant Orders    Lipid panel reflex to direct LDL Fasting (Completed)      Other Visit Diagnoses     Lightheadedness    -  Primary  Unclear etiology.  We will do labs to rule out metabolic sources.  No obvious signs of infection.  Could be a vasovagal response.  Will keep an eye on blood pressures when he is symptomatic.  If persistent or worsening patient will contact the office.  I will keep him apprised of his lab results.    Relevant Orders    ESR: Erythrocyte sedimentation rate (Completed)    CRP, inflammation (Completed)     Chronic throat clearing      Unclear etiology.  We will do a trial of omeprazole 20 mg twice daily x14 days.  If this is not improving his symptoms will transition to cetirizine trial for 1 week.  If no improvement with cetirizine will trial Flonase (if suboptimal response, add Flonase).  If no improvement with any of these regimens will pursue ENT evaluation.    Relevant Medications    omeprazole (PRILOSEC) 20 MG DR capsule    cetirizine (ZYRTEC) 10 MG tablet    fluticasone (FLONASE) 50 MCG/ACT nasal spray         61 minutes spent on the date of the encounter doing chart review, history and exam, documentation and further activities as noted above     BMI:   Estimated body mass index is 36.92 kg/m  as calculated from the following:    Height as of this encounter: 1.829 m (6').    Weight as of this encounter: 123.5 kg (272 lb 3.2 oz).   Weight management plan: Discussed healthy diet and exercise guidelines      Return in about 3 months (around 4/28/2021) for Medication recheck, Fasting labs.    Jasmyn Giles PA-C  [facility]     Thomas is a 50 year old who presents to clinic today for the following health issues  accompanied by his spouse:    HPI       Diabetes Follow-up  Patient is currently taking Metformin 2000 mg nightly.  This was increased from 1000 mg in the a.m. 2020.  Unfortunately, his hemoglobin A1c did not improve in January 2021.  He states that his diet has been poor.  He did have his rotator cuff repair surgery in May but it did not heal well and had to have a recurrent surgery January 13, 2021.  He states that his recovery is going okay.  He is off of all pain medication.  His surgeon wanted him to be seen to have modifications in his diabetes regimen due to the amount of scar tissue that was present when he went back in for the revision rotator cuff repair.    How often are you checking your blood sugar? Not at all    What concerns do you have today  about your diabetes? Blood sugar is often over 200     Do you have any of these symptoms? (Select all that apply)  No numbness or tingling in feet.  No redness, sores or blisters on feet.  No complaints of excessive thirst.  No reports of blurry vision.  No significant changes to weight.    Have you had a diabetic eye exam in the last 12 months? No    Hyperlipidemia Follow-Up  Patient was transition from simvastatin to rosuvastatin in May 2020.    Are you regularly taking any medication or supplement to lower your cholesterol?   Yes- rosuvastatin    Are you having muscle aches or other side effects that you think could be caused by your cholesterol lowering medication?  No    Hypertension Follow-up  Lisinopril 40 mg, chlorthalidone 25 mg    Do you check your blood pressure regularly outside of the clinic? No     Are you following a low salt diet? No    Are your blood pressures ever more than 140 on the top number (systolic) OR more   than 90 on the bottom number (diastolic), for example 140/90? unknown    BP Readings from Last 2 Encounters:   01/28/21 110/70   01/26/21 109/70     Hemoglobin A1C (%)   Date Value   01/07/2021 8.6 (H)   05/07/2020 8.3 (H)     LDL Cholesterol Calculated (mg/dL)   Date Value   05/07/2020 110 (H)   11/04/2019 149 (H)         How many servings of fruits and vegetables do you eat daily?  0-1    On average, how many sweetened beverages do you drink each day (Examples: soda, juice, sweet tea, etc.  Do NOT count diet or artificially sweetened beverages)?   0    How many days per week do you exercise enough to make your heart beat faster? 3 or less    How many minutes a day do you exercise enough to make your heart beat faster? 9 or less  How many days per week do you miss taking your medication? 2    What makes it hard for you to take your medications?  doesn't like taking medications    Throat clearing  Acute illness concerns: cough/tickle in throat  Onset/Duration: several  "months  Symptoms:  Fever: no  Chills/Sweats: no  Headache (location?): no  Sinus Pressure: no  Conjunctivitis:  no  Ear Pain: no  Rhinorrhea: YES  Congestion: no  Sore Throat: YES  Cough: YES - several months  Wheeze: no  Decreased Appetite: no  Nausea: no  Vomiting: no  Diarrhea: YES  Dysuria/Freq.: no  Dysuria or Hematuria: no  Fatigue/Achiness: no  Sick/Strep Exposure: no  Therapies tried and outcome: None    Patient reports that it is not worse in the morning, not worse after eating.  No history of significant reflux.  Does feel like he is \"stuffed up' often.  Is not currently taking antihistamine.  Is not using nasal sprays.      Lightheadedness  Patient reports that since his most recent physical therapy appointment on 1/26/2021 he has had a few different episodes of lightheadedness and \"feeling off \".  This seems to be preceded by a lot of movement of his surgical shoulder.  He did not have issues like this after his first surgery in May.  He states that he does have residual symptoms that can last into the next day.  This is happened now twice and it is concerning to him.  He denies any fever/chills/night sweats.  Denies any significant pain in the shoulder.  He has checked his blood sugars and they are above 150.  He has not checked his blood pressures when he has these episodes.  No COVID-19 exposures.    Review of Systems   Constitutional, HEENT, cardiovascular, pulmonary, GI, , musculoskeletal, neuro, skin, endocrine and psych systems are negative, except as otherwise noted.      Objective    /70 (BP Location: Right arm, Patient Position: Sitting, Cuff Size: Adult Large)   Pulse 98   Temp 97.9  F (36.6  C) (Tympanic)   Ht 1.829 m (6')   Wt 123.5 kg (272 lb 3.2 oz)   SpO2 97%   BMI 36.92 kg/m    Body mass index is 36.92 kg/m .  Physical Exam   GENERAL: healthy, alert and no distress  EYES: Eyes grossly normal to inspection, PERRL and conjunctivae and sclerae normal  HENT: ear canals and TM's " normal, nose and mouth without ulcers or lesions  NECK: no adenopathy, no asymmetry, masses, or scars and thyroid normal to palpation  RESP: lungs clear to auscultation - no rales, rhonchi or wheezes  CV: regular rate and rhythm, normal S1 S2, no S3 or S4, no murmur, click or rub, no peripheral edema and peripheral pulses strong  MS: no gross musculoskeletal defects noted, no edema  SKIN: no suspicious lesions or rashes  NEURO: Normal strength and tone, mentation intact and speech normal  PSYCH: mentation appears normal, affect normal/bright    Results for orders placed or performed in visit on 01/28/21   CBC with platelets and differential     Status: None   Result Value Ref Range    WBC 10.7 4.0 - 11.0 10e9/L    RBC Count 5.03 4.4 - 5.9 10e12/L    Hemoglobin 15.1 13.3 - 17.7 g/dL    Hematocrit 43.4 40.0 - 53.0 %    MCV 86 78 - 100 fl    MCH 30.0 26.5 - 33.0 pg    MCHC 34.8 31.5 - 36.5 g/dL    RDW 12.1 10.0 - 15.0 %    Platelet Count 306 150 - 450 10e9/L    % Neutrophils 68.1 %    % Lymphocytes 23.7 %    % Monocytes 5.7 %    % Eosinophils 2.1 %    % Basophils 0.4 %    Absolute Neutrophil 7.3 1.6 - 8.3 10e9/L    Absolute Lymphocytes 2.5 0.8 - 5.3 10e9/L    Absolute Monocytes 0.6 0.0 - 1.3 10e9/L    Absolute Eosinophils 0.2 0.0 - 0.7 10e9/L    Absolute Basophils 0.0 0.0 - 0.2 10e9/L    Diff Method Automated Method    ESR: Erythrocyte sedimentation rate     Status: None   Result Value Ref Range    Sed Rate 20 0 - 20 mm/h                Statement Selected

## 2025-01-20 ENCOUNTER — OFFICE VISIT (OUTPATIENT)
Dept: FAMILY MEDICINE | Facility: CLINIC | Age: 55
End: 2025-01-20
Payer: COMMERCIAL

## 2025-01-20 DIAGNOSIS — E11.9 TYPE 2 DIABETES MELLITUS WITHOUT COMPLICATION, WITHOUT LONG-TERM CURRENT USE OF INSULIN (H): ICD-10-CM

## 2025-01-20 LAB
EST. AVERAGE GLUCOSE BLD GHB EST-MCNC: 108 MG/DL
HBA1C MFR BLD: 5.4 % (ref 0–5.6)

## 2025-01-20 PROCEDURE — 99207 PR NO CHARGE LOS: CPT | Performed by: NURSE PRACTITIONER

## 2025-01-20 PROCEDURE — 83036 HEMOGLOBIN GLYCOSYLATED A1C: CPT | Performed by: NURSE PRACTITIONER

## 2025-01-20 PROCEDURE — 36415 COLL VENOUS BLD VENIPUNCTURE: CPT | Performed by: NURSE PRACTITIONER

## 2025-01-20 NOTE — PROGRESS NOTES
Appointment not needed just his A1c lab which was already ordered.  Set up for physical in April.      Healthy regards,            Julia Boland, FNP-BC

## 2025-01-23 NOTE — RESULT ENCOUNTER NOTE
Dear Nick,    Here is a summary of your recent test results:    -A1C (test of diabetes control the last 2-3 months) is at your goal. Please continue with your current plan.    For additional lab test information, [email or web address] is an excellent reference.    In addition, here is a list of due or overdue Health Maintenance reminders:    Pneumococcal Vaccine(2 of 2 - PCV) due on 10/16/2019  Flu Vaccine(1) due on 09/01/2024  ANNUAL REVIEW OF HM ORDERS due on 04/15/2025    Please call us at [phone] (or use [employer]) to address the above recommendations if needed.    Thank you for choosing [facility].  It was an honor and a privilege to participate in your care.       Healthy regards,    Julia Boland, ANTONINO  [facility]

## 2025-04-01 ENCOUNTER — PATIENT OUTREACH (OUTPATIENT)
Dept: CARE COORDINATION | Facility: CLINIC | Age: 55
End: 2025-04-01
Payer: COMMERCIAL

## 2025-04-15 ENCOUNTER — PATIENT OUTREACH (OUTPATIENT)
Dept: CARE COORDINATION | Facility: CLINIC | Age: 55
End: 2025-04-15
Payer: COMMERCIAL

## 2025-04-24 ENCOUNTER — TRANSFERRED RECORDS (OUTPATIENT)
Dept: HEALTH INFORMATION MANAGEMENT | Facility: CLINIC | Age: 55
End: 2025-04-24
Payer: COMMERCIAL

## 2025-05-08 DIAGNOSIS — I10 HTN, GOAL BELOW 130/80: ICD-10-CM

## 2025-05-08 DIAGNOSIS — E11.9 TYPE 2 DIABETES MELLITUS WITHOUT COMPLICATION, WITHOUT LONG-TERM CURRENT USE OF INSULIN (H): ICD-10-CM

## 2025-05-08 DIAGNOSIS — E78.5 HYPERLIPIDEMIA LDL GOAL <100: ICD-10-CM

## 2025-05-08 DIAGNOSIS — E66.01 MORBID OBESITY (H): ICD-10-CM

## 2025-05-08 RX ORDER — METFORMIN HYDROCHLORIDE 500 MG/1
2000 TABLET, EXTENDED RELEASE ORAL AT BEDTIME
Qty: 360 TABLET | Refills: 0 | Status: SHIPPED | OUTPATIENT
Start: 2025-05-08

## 2025-05-08 RX ORDER — LISINOPRIL 20 MG/1
20 TABLET ORAL DAILY
Qty: 90 TABLET | Refills: 0 | Status: SHIPPED | OUTPATIENT
Start: 2025-05-08

## 2025-05-08 RX ORDER — ROSUVASTATIN CALCIUM 40 MG/1
40 TABLET, COATED ORAL DAILY
Qty: 90 TABLET | Refills: 0 | Status: SHIPPED | OUTPATIENT
Start: 2025-05-08

## 2025-05-08 RX ORDER — CHLORTHALIDONE 25 MG/1
25 TABLET ORAL DAILY
Qty: 90 TABLET | Refills: 0 | Status: SHIPPED | OUTPATIENT
Start: 2025-05-08

## 2025-05-08 NOTE — TELEPHONE ENCOUNTER
LDL Cholesterol Calculated   Date Value Ref Range Status   04/21/2024 39 <=100 mg/dL Final   01/28/2021 92 <100 mg/dL Final     Comment:     Desirable:       <100 mg/dl

## 2025-05-25 ENCOUNTER — HEALTH MAINTENANCE LETTER (OUTPATIENT)
Age: 55
End: 2025-05-25

## 2025-07-27 ENCOUNTER — HEALTH MAINTENANCE LETTER (OUTPATIENT)
Age: 55
End: 2025-07-27

## 2025-08-07 ENCOUNTER — OFFICE VISIT (OUTPATIENT)
Dept: FAMILY MEDICINE | Facility: CLINIC | Age: 55
End: 2025-08-07
Payer: COMMERCIAL

## 2025-08-07 VITALS
OXYGEN SATURATION: 97 % | DIASTOLIC BLOOD PRESSURE: 76 MMHG | HEART RATE: 68 BPM | SYSTOLIC BLOOD PRESSURE: 117 MMHG | BODY MASS INDEX: 29.97 KG/M2 | RESPIRATION RATE: 16 BRPM | HEIGHT: 72 IN | WEIGHT: 221.3 LBS | TEMPERATURE: 97.4 F

## 2025-08-07 DIAGNOSIS — E11.9 TYPE 2 DIABETES MELLITUS WITHOUT COMPLICATION, WITHOUT LONG-TERM CURRENT USE OF INSULIN (H): Primary | ICD-10-CM

## 2025-08-07 DIAGNOSIS — E66.09 CLASS 1 OBESITY DUE TO EXCESS CALORIES WITH SERIOUS COMORBIDITY AND BODY MASS INDEX (BMI) OF 30.0 TO 30.9 IN ADULT: ICD-10-CM

## 2025-08-07 DIAGNOSIS — E78.5 HYPERLIPIDEMIA LDL GOAL <100: ICD-10-CM

## 2025-08-07 DIAGNOSIS — Z12.5 SCREENING FOR PROSTATE CANCER: ICD-10-CM

## 2025-08-07 DIAGNOSIS — I10 HTN, GOAL BELOW 130/80: ICD-10-CM

## 2025-08-07 DIAGNOSIS — E66.811 CLASS 1 OBESITY DUE TO EXCESS CALORIES WITH SERIOUS COMORBIDITY AND BODY MASS INDEX (BMI) OF 30.0 TO 30.9 IN ADULT: ICD-10-CM

## 2025-08-07 LAB
ALBUMIN SERPL BCG-MCNC: 4.6 G/DL (ref 3.5–5.2)
ALP SERPL-CCNC: 50 U/L (ref 40–150)
ALT SERPL W P-5'-P-CCNC: 16 U/L (ref 0–70)
ANION GAP SERPL CALCULATED.3IONS-SCNC: 10 MMOL/L (ref 7–15)
AST SERPL W P-5'-P-CCNC: 22 U/L (ref 0–45)
BILIRUB SERPL-MCNC: 0.3 MG/DL
BUN SERPL-MCNC: 22.3 MG/DL (ref 6–20)
CALCIUM SERPL-MCNC: 9.5 MG/DL (ref 8.8–10.4)
CHLORIDE SERPL-SCNC: 102 MMOL/L (ref 98–107)
CHOLEST SERPL-MCNC: 249 MG/DL
CREAT SERPL-MCNC: 0.9 MG/DL (ref 0.67–1.17)
CREAT UR-MCNC: 43.3 MG/DL
EGFRCR SERPLBLD CKD-EPI 2021: >90 ML/MIN/1.73M2
ERYTHROCYTE [DISTWIDTH] IN BLOOD BY AUTOMATED COUNT: 11.8 % (ref 10–15)
EST. AVERAGE GLUCOSE BLD GHB EST-MCNC: 117 MG/DL
FASTING STATUS PATIENT QL REPORTED: ABNORMAL
FASTING STATUS PATIENT QL REPORTED: ABNORMAL
GLUCOSE SERPL-MCNC: 129 MG/DL (ref 70–99)
HBA1C MFR BLD: 5.7 % (ref 0–5.6)
HCO3 SERPL-SCNC: 26 MMOL/L (ref 22–29)
HCT VFR BLD AUTO: 40.3 % (ref 40–53)
HDLC SERPL-MCNC: 61 MG/DL
HGB BLD-MCNC: 14.1 G/DL (ref 13.3–17.7)
LDLC SERPL CALC-MCNC: 174 MG/DL
MCH RBC QN AUTO: 30.8 PG (ref 26.5–33)
MCHC RBC AUTO-ENTMCNC: 35 G/DL (ref 31.5–36.5)
MCV RBC AUTO: 88 FL (ref 78–100)
MICROALBUMIN UR-MCNC: <12 MG/L
MICROALBUMIN/CREAT UR: NORMAL MG/G{CREAT}
NONHDLC SERPL-MCNC: 188 MG/DL
PLATELET # BLD AUTO: 274 10E3/UL (ref 150–450)
POTASSIUM SERPL-SCNC: 4.2 MMOL/L (ref 3.4–5.3)
PROT SERPL-MCNC: 7.1 G/DL (ref 6.4–8.3)
PSA SERPL DL<=0.01 NG/ML-MCNC: 0.56 NG/ML (ref 0–3.5)
RBC # BLD AUTO: 4.58 10E6/UL (ref 4.4–5.9)
SODIUM SERPL-SCNC: 138 MMOL/L (ref 135–145)
TRIGL SERPL-MCNC: 69 MG/DL
VIT B12 SERPL-MCNC: 714 PG/ML (ref 232–1245)
WBC # BLD AUTO: 5.9 10E3/UL (ref 4–11)

## 2025-08-07 RX ORDER — ROSUVASTATIN CALCIUM 40 MG/1
40 TABLET, COATED ORAL DAILY
Qty: 90 TABLET | Refills: 1 | Status: SHIPPED | OUTPATIENT
Start: 2025-08-07

## 2025-08-07 RX ORDER — CHLORTHALIDONE 25 MG/1
25 TABLET ORAL DAILY
Qty: 90 TABLET | Refills: 1 | Status: SHIPPED | OUTPATIENT
Start: 2025-08-07

## 2025-08-07 RX ORDER — LISINOPRIL 20 MG/1
20 TABLET ORAL DAILY
Qty: 90 TABLET | Refills: 1 | Status: SHIPPED | OUTPATIENT
Start: 2025-08-07

## 2025-08-07 RX ORDER — METFORMIN HYDROCHLORIDE 500 MG/1
2000 TABLET, EXTENDED RELEASE ORAL AT BEDTIME
Qty: 360 TABLET | Refills: 1 | Status: SHIPPED | OUTPATIENT
Start: 2025-08-07

## 2025-08-11 ENCOUNTER — PATIENT OUTREACH (OUTPATIENT)
Dept: CARE COORDINATION | Facility: CLINIC | Age: 55
End: 2025-08-11
Payer: COMMERCIAL

## (undated) DEVICE — IMM KIT SHOULDER STABILIZATION 7210573

## (undated) DEVICE — BRUSH SURGICAL SCRUB W/4% CHG SOL 25ML 371073

## (undated) DEVICE — Device

## (undated) DEVICE — LINEN ORTHO PACK 5446

## (undated) DEVICE — RESTRAINT LIMB HOLDER ANKLE/WRIST FOAM W/QUICK RELEASE 2533

## (undated) DEVICE — NDL 18GA 1.5" 305196

## (undated) DEVICE — BUR ARTHREX COOLCUT EXCALIBUR 4.0MMX13CM AR-8400EX

## (undated) DEVICE — TUBING SET ARTHREX DUALWAVE OUTFLOW AR-6430

## (undated) DEVICE — BUR ARTHREX COOLCUT BONE CUTTER 4.0MMX13CM AR-8400BC

## (undated) DEVICE — IMM KIT SHOULDER TMAX MASK FACE 7210559

## (undated) DEVICE — NDL 22GA 1.5"

## (undated) DEVICE — SYR 30ML LL W/O NDL 302832

## (undated) DEVICE — PACK SHOULDER CUSTOM ASC SOP15ASUMA

## (undated) DEVICE — BRUSH SURGICAL SCRUB W/4% CHLORHEXIDINE GLUCONATE SOL 4458A

## (undated) DEVICE — ENDO TRAP POLYP QUICK CATCH 710201

## (undated) DEVICE — ABLATOR ARTHREX APOLLO RF MP90 ASPIRATING 90DEG AR-9811

## (undated) DEVICE — DEVICE PASSER LASSO 90DEG AR-6068-90

## (undated) DEVICE — SOL NACL 0.9% IRRIG 3000ML BAG 2B7477

## (undated) DEVICE — GLOVE PROTEXIS W/NEU-THERA 8.5  2D73TE85

## (undated) DEVICE — DRAPE STERI U 1015

## (undated) DEVICE — DRAPE ARTHROSCOPY SHOULDER BEACHCHAIR 29369

## (undated) DEVICE — LINEN TOWEL PACK X5 5464

## (undated) DEVICE — ARTHROSCOPIC CANNULA TWIST-IN PURPLE 7MMX7CM AR-6570

## (undated) DEVICE — SOL HYDROGEN PEROXIDE 3% 4OZ BOTTLE F0010

## (undated) DEVICE — SYR 10ML FINGER CONTROL W/O NDL 309695

## (undated) DEVICE — SU ETHILON 3-0 PS-2 18" 1669H

## (undated) DEVICE — DRAPE MAYO STAND 23X54 8337

## (undated) DEVICE — SUCTION MANIFOLD NEPTUNE 2 SYS 4 PORT 0702-020-000

## (undated) DEVICE — DRAPE U-DRAPE 1015NSD NON-STERILE

## (undated) DEVICE — PREP CHLORAPREP 26ML TINTED ORANGE  260815

## (undated) DEVICE — SU FIBERWIRE 2 38" 2-STRAND WHITE&BLUE W/O NDL AR-7240

## (undated) DEVICE — SU ETHILON 3-0 PS-1 18" 1663H

## (undated) DEVICE — BLADE CLIPPER SGL USE 9680

## (undated) DEVICE — SOL WATER IRRIG 1000ML BOTTLE 2F7114

## (undated) DEVICE — SUCTION MANIFOLD DORNOCH ULTRA CART UL-CL500

## (undated) DEVICE — POSITIONER ARMBOARD FOAM 1PAIR LF FP-ARMB1

## (undated) DEVICE — STRAP KNEE/BODY 31143004

## (undated) DEVICE — GLOVE PROTEXIS POWDER FREE SMT 8.0  2D72PT80X

## (undated) DEVICE — NDL ARTHREX MULTIFIRE/FASTPASS SCORPION AR-13995N

## (undated) DEVICE — STRAP STIRRUP W/SLIP 30187-030

## (undated) DEVICE — GLOVE PROTEXIS POWDER FREE 8.5 ORTHOPEDIC 2D73ET85

## (undated) DEVICE — TUBING SYSTEM ARTHREX PATIENT REDEUCE AR-6421

## (undated) DEVICE — ESU GROUND PAD ADULT W/CORD E7507

## (undated) DEVICE — KIT ENDO TURNOVER/PROCEDURE W/CLEAN A SCOPE LINERS 103888

## (undated) DEVICE — DRAPE IOBAN INCISE 23X17" 6650EZ

## (undated) DEVICE — DRSG STERI STRIP 1/2X4" R1547

## (undated) DEVICE — DRAPE U-POUCH 34X29" 1067

## (undated) DEVICE — TUBING ARTHROSCOPY PUMP ARTHREX AR-6410

## (undated) DEVICE — ENDO SNARE POLYPECTOMY OVAL 15MM LOOP SD-240U-15

## (undated) RX ORDER — ONDANSETRON 2 MG/ML
INJECTION INTRAMUSCULAR; INTRAVENOUS
Status: DISPENSED
Start: 2020-05-12

## (undated) RX ORDER — PROPOFOL 10 MG/ML
INJECTION, EMULSION INTRAVENOUS
Status: DISPENSED
Start: 2020-05-12

## (undated) RX ORDER — FENTANYL CITRATE 50 UG/ML
INJECTION, SOLUTION INTRAMUSCULAR; INTRAVENOUS
Status: DISPENSED
Start: 2023-06-19

## (undated) RX ORDER — ONDANSETRON 2 MG/ML
INJECTION INTRAMUSCULAR; INTRAVENOUS
Status: DISPENSED
Start: 2021-01-13

## (undated) RX ORDER — FENTANYL CITRATE 50 UG/ML
INJECTION, SOLUTION INTRAMUSCULAR; INTRAVENOUS
Status: DISPENSED
Start: 2021-01-13

## (undated) RX ORDER — CHLOROPROCAINE HYDROCHLORIDE 30 MG/ML
INJECTION, SOLUTION EPIDURAL; INFILTRATION; INTRACAUDAL; PERINEURAL
Status: DISPENSED
Start: 2020-05-12

## (undated) RX ORDER — GLYCOPYRROLATE 0.2 MG/ML
INJECTION INTRAMUSCULAR; INTRAVENOUS
Status: DISPENSED
Start: 2020-05-12

## (undated) RX ORDER — EPINEPHRINE NASAL SOLUTION 1 MG/ML
SOLUTION NASAL
Status: DISPENSED
Start: 2020-05-12

## (undated) RX ORDER — HYDRALAZINE HYDROCHLORIDE 20 MG/ML
INJECTION INTRAMUSCULAR; INTRAVENOUS
Status: DISPENSED
Start: 2020-05-12

## (undated) RX ORDER — GABAPENTIN 300 MG/1
CAPSULE ORAL
Status: DISPENSED
Start: 2021-01-13

## (undated) RX ORDER — CEFAZOLIN SODIUM 1 G/3ML
INJECTION, POWDER, FOR SOLUTION INTRAMUSCULAR; INTRAVENOUS
Status: DISPENSED
Start: 2020-05-12

## (undated) RX ORDER — IBUPROFEN 400 MG/1
TABLET, FILM COATED ORAL
Status: DISPENSED
Start: 2021-01-13

## (undated) RX ORDER — KETOROLAC TROMETHAMINE 30 MG/ML
INJECTION, SOLUTION INTRAMUSCULAR; INTRAVENOUS
Status: DISPENSED
Start: 2021-01-13

## (undated) RX ORDER — HYDROMORPHONE HYDROCHLORIDE 1 MG/ML
INJECTION, SOLUTION INTRAMUSCULAR; INTRAVENOUS; SUBCUTANEOUS
Status: DISPENSED
Start: 2021-01-13

## (undated) RX ORDER — SODIUM CHLORIDE, SODIUM LACTATE, POTASSIUM CHLORIDE, CALCIUM CHLORIDE 600; 310; 30; 20 MG/100ML; MG/100ML; MG/100ML; MG/100ML
INJECTION, SOLUTION INTRAVENOUS
Status: DISPENSED
Start: 2021-01-13

## (undated) RX ORDER — LABETALOL 20 MG/4 ML (5 MG/ML) INTRAVENOUS SYRINGE
Status: DISPENSED
Start: 2020-05-12

## (undated) RX ORDER — IBUPROFEN 200 MG
TABLET ORAL
Status: DISPENSED
Start: 2020-05-12

## (undated) RX ORDER — ACETAMINOPHEN 325 MG/1
TABLET ORAL
Status: DISPENSED
Start: 2021-01-13

## (undated) RX ORDER — ACETAMINOPHEN 325 MG/1
TABLET ORAL
Status: DISPENSED
Start: 2020-05-12

## (undated) RX ORDER — BUPIVACAINE HYDROCHLORIDE AND EPINEPHRINE 5; 5 MG/ML; UG/ML
INJECTION, SOLUTION PERINEURAL
Status: DISPENSED
Start: 2021-01-13

## (undated) RX ORDER — FENTANYL CITRATE 50 UG/ML
INJECTION, SOLUTION INTRAMUSCULAR; INTRAVENOUS
Status: DISPENSED
Start: 2020-05-12

## (undated) RX ORDER — BUPIVACAINE HYDROCHLORIDE 5 MG/ML
INJECTION, SOLUTION PERINEURAL
Status: DISPENSED
Start: 2020-05-12

## (undated) RX ORDER — BUPIVACAINE HYDROCHLORIDE 2.5 MG/ML
INJECTION, SOLUTION EPIDURAL; INFILTRATION; INTRACAUDAL
Status: DISPENSED
Start: 2020-05-12

## (undated) RX ORDER — FENTANYL CITRATE-0.9 % NACL/PF 10 MCG/ML
PLASTIC BAG, INJECTION (ML) INTRAVENOUS
Status: DISPENSED
Start: 2021-01-13

## (undated) RX ORDER — KETOROLAC TROMETHAMINE 30 MG/ML
INJECTION, SOLUTION INTRAMUSCULAR; INTRAVENOUS
Status: DISPENSED
Start: 2020-05-12

## (undated) RX ORDER — CEFAZOLIN SODIUM IN 0.9 % NACL 3 G/100 ML
INTRAVENOUS SOLUTION, PIGGYBACK (ML) INTRAVENOUS
Status: DISPENSED
Start: 2021-01-13

## (undated) RX ORDER — GABAPENTIN 300 MG/1
CAPSULE ORAL
Status: DISPENSED
Start: 2020-05-12

## (undated) RX ORDER — LIDOCAINE HYDROCHLORIDE AND EPINEPHRINE 10; 10 MG/ML; UG/ML
INJECTION, SOLUTION INFILTRATION; PERINEURAL
Status: DISPENSED
Start: 2020-05-12